# Patient Record
Sex: FEMALE | Race: WHITE | Employment: FULL TIME | URBAN - METROPOLITAN AREA
[De-identification: names, ages, dates, MRNs, and addresses within clinical notes are randomized per-mention and may not be internally consistent; named-entity substitution may affect disease eponyms.]

---

## 2017-01-05 ENCOUNTER — OFFICE VISIT (OUTPATIENT)
Dept: FAMILY MEDICINE | Facility: CLINIC | Age: 22
End: 2017-01-05
Payer: COMMERCIAL

## 2017-01-05 VITALS
SYSTOLIC BLOOD PRESSURE: 124 MMHG | DIASTOLIC BLOOD PRESSURE: 78 MMHG | WEIGHT: 221 LBS | BODY MASS INDEX: 34.61 KG/M2 | TEMPERATURE: 97.1 F | HEART RATE: 99 BPM | OXYGEN SATURATION: 97 %

## 2017-01-05 DIAGNOSIS — Z30.013 ENCOUNTER FOR INITIAL PRESCRIPTION OF INJECTABLE CONTRACEPTIVE: ICD-10-CM

## 2017-01-05 DIAGNOSIS — F41.8 DEPRESSION WITH ANXIETY: Primary | ICD-10-CM

## 2017-01-05 PROCEDURE — 99213 OFFICE O/P EST LOW 20 MIN: CPT | Mod: 25 | Performed by: PHYSICIAN ASSISTANT

## 2017-01-05 PROCEDURE — 96372 THER/PROPH/DIAG INJ SC/IM: CPT | Performed by: PHYSICIAN ASSISTANT

## 2017-01-05 ASSESSMENT — ANXIETY QUESTIONNAIRES
7. FEELING AFRAID AS IF SOMETHING AWFUL MIGHT HAPPEN: SEVERAL DAYS
IF YOU CHECKED OFF ANY PROBLEMS ON THIS QUESTIONNAIRE, HOW DIFFICULT HAVE THESE PROBLEMS MADE IT FOR YOU TO DO YOUR WORK, TAKE CARE OF THINGS AT HOME, OR GET ALONG WITH OTHER PEOPLE: SOMEWHAT DIFFICULT
3. WORRYING TOO MUCH ABOUT DIFFERENT THINGS: MORE THAN HALF THE DAYS
2. NOT BEING ABLE TO STOP OR CONTROL WORRYING: MORE THAN HALF THE DAYS
GAD7 TOTAL SCORE: 11
5. BEING SO RESTLESS THAT IT IS HARD TO SIT STILL: NOT AT ALL
1. FEELING NERVOUS, ANXIOUS, OR ON EDGE: MORE THAN HALF THE DAYS
6. BECOMING EASILY ANNOYED OR IRRITABLE: NEARLY EVERY DAY

## 2017-01-05 ASSESSMENT — PATIENT HEALTH QUESTIONNAIRE - PHQ9: 5. POOR APPETITE OR OVEREATING: SEVERAL DAYS

## 2017-01-05 NOTE — NURSING NOTE
===View-only below this line===  >> Ami Kong   1/5/2017  1:16 PM  BLOOD PRESSURE: 124/78    DATE OF LAST PAP or ANNUAL EXAM: No results found for this basename: pap  URINE HCG:not indicated    The following medication was given:     MEDICATION: Depo Provera 150mg  ROUTE: IM  SITE: LUQ - Gluteus  : [employer]  LOT #: [id number]  EXPIRATION:07/01/19  NEXT INJECTION DUE: March 23- April 6 2016   Provider: Kristen Kehr  Given by: Ami Kong MA

## 2017-01-05 NOTE — PROGRESS NOTES
SUBJECTIVE:                                                    Meme Mccurdy is a 21 year old female who presents to clinic today for the following health issues:    Meme continues to use the sertraline 200 mg daily. She thinks her anxiety has been difficult to manage over the college winter break.   Her sleeping patterns are off because she slept so much during the day after her recent surgery. She feels better now and trying to get into a pattern again.   She will be back at school in 1-2 weeks. She will be working with her counselor when she returns to school.     Today, she will also need her depo injection.     Depression and Anxiety Follow-Up    Status since last visit: Improved     Other associated symptoms:None    Complicating factors:     Significant life event: No     Current substance abuse: None    PHQ-9 SCORE 9/20/2016 10/24/2016 11/21/2016   Total Score - - -   Total Score 10 10 11     AVRIL-7 SCORE 9/20/2016 10/24/2016 11/21/2016   Total Score - - -   Total Score 15 16 12        PHQ-9  English      PHQ-9   Any Language     GAD7         Amount of exercise or physical activity: 2-3 days/week for an average of greater than 60 minutes    Problems taking medications regularly: No    Medication side effects: none    Diet: regular (no restrictions)      Problem list and histories reviewed & adjusted, as indicated.  Additional history: as documented    Patient Active Problem List   Diagnosis     Melanocytic nevus of face     Ovarian cyst     BMI (body mass index), pediatric, 85% to less than 95% for age     Depression with anxiety     Insomnia     Encounter for initial prescription of injectable contraceptive     Past Surgical History   Procedure Laterality Date     Hc tooth extraction w/forcep  9/08     Tonsillectomy Bilateral 12/19/2016     Procedure: TONSILLECTOMY;  Surgeon: Ilya Montilla MD;  Location:  OR       Social History   Substance Use Topics     Smoking status: Never Smoker       Smokeless tobacco: Never Used     Alcohol Use: No     Family History   Problem Relation Age of Onset     Hypertension Mother      Arthritis Mother      Lipids Father      High Cholesterol     Breast Cancer Maternal Grandmother      CANCER Maternal Grandmother      DIABETES Maternal Grandfather      Hypertension Maternal Grandfather      Thyroid Disease No family hx of      Glaucoma No family hx of      Macular Degeneration No family hx of      CEREBROVASCULAR DISEASE Other          Current Outpatient Prescriptions   Medication Sig Dispense Refill     sertraline (ZOLOFT) 100 MG tablet Take 2 tablets (200 mg) by mouth daily 180 tablet 1     famotidine (PEPCID) 40 MG tablet Take 1 tablet (40 mg) by mouth At Bedtime 30 tablet 3     medroxyPROGESTERone (DEPO-PROVERA) 150 MG/ML injection Inject 1 mL (150 mg) into the muscle every 3 months 3 mL 3     ibuprofen (ADVIL,MOTRIN) 800 MG tablet Take 1 tablet by mouth every 8 hours as needed for pain. 60 tablet 1     Allergies   Allergen Reactions     Nkda [No Known Drug Allergies]        ROS:  Constitutional, HEENT, cardiovascular, pulmonary, gi and gu systems are negative, except as otherwise noted.    OBJECTIVE:                                                    /78 mmHg  Pulse 99  Temp(Src) 97.1  F (36.2  C) (Oral)  Wt 221 lb (100.245 kg)  SpO2 97%  Body mass index is 34.61 kg/(m^2).  GENERAL: healthy, alert and no distress  SKIN: no suspicious lesions or rashes  NEURO: Normal strength and tone, mentation intact and speech normal  PSYCH: mentation appears normal, affect normal/bright, judgement and insight intact and appearance well groomed    Diagnostic Test Results:  none      ASSESSMENT/PLAN:                                                    1. Depression with anxiety  Most of the appointment time was taken today for counseling. Medication adjustment will not help with her sleep patterns.   She is going to work on avoiding naps during the day, exercise and  keeping busy when she is on break, instead of sleeping.   She will get into a routine schedule for when she gets back to college. Set a regular bedtime schedule this week, no electronics 1 hour prior to laying down for bed.   Avoid caffeine. Healthy habits will help. Plan to work with her counselor when she returns to school to learn better life skills and behavioral modification for feelings of anxiety.     15 minutes spent in face to face counseling today.     2. Encounter for initial prescription of injectable contraceptive  - Medroxyprogesterone inj/1mg (Depo Provera J-Code)      Kristen M. Kehr, PA-C  [facility]

## 2017-01-05 NOTE — NURSING NOTE
"Chief Complaint   Patient presents with     Depression     recheck       Initial /78 mmHg  Pulse 99  Temp(Src) 97.1  F (36.2  C) (Oral)  Wt 221 lb (100.245 kg)  SpO2 97% Estimated body mass index is 34.61 kg/(m^2) as calculated from the following:    Height as of 12/13/16: 5' 7\" (1.702 m).    Weight as of this encounter: 221 lb (100.245 kg).  BP completed using cuff size: BEULAH Jones    "

## 2017-01-06 ASSESSMENT — ANXIETY QUESTIONNAIRES: GAD7 TOTAL SCORE: 11

## 2017-01-06 ASSESSMENT — PATIENT HEALTH QUESTIONNAIRE - PHQ9: SUM OF ALL RESPONSES TO PHQ QUESTIONS 1-9: 13

## 2017-01-20 ENCOUNTER — OFFICE VISIT (OUTPATIENT)
Dept: OTOLARYNGOLOGY | Facility: CLINIC | Age: 22
End: 2017-01-20
Payer: COMMERCIAL

## 2017-01-20 VITALS — BODY MASS INDEX: 35.47 KG/M2 | WEIGHT: 226 LBS | RESPIRATION RATE: 16 BRPM | HEIGHT: 67 IN

## 2017-01-20 DIAGNOSIS — Z90.89 S/P TONSILLECTOMY: Primary | ICD-10-CM

## 2017-01-20 PROCEDURE — 99024 POSTOP FOLLOW-UP VISIT: CPT | Performed by: OTOLARYNGOLOGY

## 2017-01-20 ASSESSMENT — PAIN SCALES - GENERAL: PAINLEVEL: NO PAIN (0)

## 2017-01-20 NOTE — PROGRESS NOTES
"History of Present Illness - Meme Mccrudy is a 22 year old female who is status post tonsillectomy on 12/19/2016.  There was the expected amount of discomfort in the postoperative period, but at this point the patient is back to a regular diet, and not needing pain medication.  There was no bleeding.    Exam -   Resp 16  Ht 1.702 m (5' 7\")  Wt 102.513 kg (226 lb)  BMI 35.39 kg/m2  General - The patient is well nourished and well developed, and appears to have good nutritional status.  Alert and oriented to person and place, answers questions and cooperates with examination appropriately.   Head and Face - Normocephalic and atraumatic, with no gross asymmetry noted of the contour of the facial features.  The facial nerve is intact, with strong symmetric movements.  Mouth - Examination of the oral cavity shows pink, healthy, moist mucosa.  No lesions or ulceration noted.  The dentition are in good repair.  The tongue is mobile and midline.  Oropharynx - The tonsil beds are remucosalizing appropriately.  No signs of bleeding or clots.  The Uvula is midline and the soft palate is symmetric. Small amount of inferior tonsil/glossotonsillar sulcus tissue regrowth versus residual tissue    A/P - Meme Mccurdy has had an uncomplicated tonsillectomy. She feels much better. They have no restrictions at this point and can return on an as needed basis.    "

## 2017-01-20 NOTE — MR AVS SNAPSHOT
After Visit Summary   1/20/2017    Meme Mccurdy    MRN: [id number]           Patient Information     Date Of Birth          1995        Visit Information        Provider Department      1/20/2017 10:30 AM Ilya Montilla MD [facility]        Today's Diagnoses     S/P tonsillectomy    -  1       Care Instructions    General Scheduling Information  To schedule your CT/MRI scan, please contact [facility] at [phone] [address] [place] [address]    To schedule your Surgery, please contact our Specialty Schedulers at [phone]    ENT Clinic Locations Clinic Hours Telephone Number     [facility]  [address]  [place] [address]   Tuesday:       8:00am -- 4:00pm    Wednesday:  8:00am - 4:00pm   To schedule an appointment with   Dr. Montilla,   please contact our   Specialty Scheduling Department at:     [phone]       [facility]  [address] [place] [address]   Friday:          8:00am - 4:00pm         Urgent Care Locations Clinic Hours Telephone Numbers     [facility] [place]  [address] [place] [address]     Monday-Friday:     11:00pm - 9:00pm    Saturday-Sunday:  9:00am - 5:00pm   [phone]     [facility]  [address]   [place] [address]     Monday-Friday:      5:00pm - 9:00pm     Saturday-Sunday:  9:00am - 5:00pm   [phone]             Follow-ups after your visit        Who to contact     If you have questions or need follow up information about today's clinic visit or your schedule please contact [facility] directly at [phone].  Normal or non-critical lab and imaging results will be communicated to you by MyChart, letter or phone within 4 business days after the clinic has received the results. If you do not hear from us within 7 days, please contact the clinic through MyChart or phone. If you have a critical or abnormal lab result, we will notify you by phone as  "soon as possible.  Submit refill requests through [employer] or call your pharmacy and they will forward the refill request to us. Please allow 3 business days for your refill to be completed.          Additional Information About Your Visit        [employer] Information     [employer] lets you send messages to your doctor, view your test results, renew your prescriptions, schedule appointments and more. To sign up, go to [email or web address] . Click on \"Log in\" on the left side of the screen, which will take you to the Welcome page. Then click on \"Sign up Now\" on the right side of the page.     You will be asked to enter the access code listed below, as well as some personal information. Please follow the directions to create your username and password.     Your access code is: [email or web address]  Expires: 2017 10:11 AM     Your access code will  in 90 days. If you need help or a new code, please call your [facility] clinic or [phone].        Care EveryWhere ID     This is your Care EveryWhere ID. This could be used by other organizations to access your [facility] medical records  [id number]        Your Vitals Were     Respirations Height BMI (Body Mass Index)             16 1.702 m (5' 7\") 35.39 kg/m2          Blood Pressure from Last 3 Encounters:   17 124/78   16 112/72   16 122/82    Weight from Last 3 Encounters:   17 102.513 kg (226 lb)   17 100.245 kg (221 lb) (98.69 %*)   16 104.327 kg (230 lb) (98.99 %*)     * Growth percentiles are based on CDC 2-20 Years data.              Today, you had the following     No orders found for display       Primary Care Provider Office Phone # Fax #    Kristen M Kehr, PA-C [phone]       [facility] [address] [facility] [address]        Thank you!     Thank you for choosing [facility]  for your care. Our goal is always to provide you with excellent care. Hearing back from our patients " is one way we can continue to improve our services. Please take a few minutes to complete the written survey that you may receive in the mail after your visit with us. Thank you!             Your Updated Medication List - Protect others around you: Learn how to safely use, store and throw away your medicines at www.disposemymeds.org.          This list is accurate as of: 1/20/17 12:36 PM.  Always use your most recent med list.                   Brand Name Dispense Instructions for use    famotidine 40 MG tablet    PEPCID    30 tablet    Take 1 tablet (40 mg) by mouth At Bedtime       ibuprofen 800 MG tablet    ADVIL/MOTRIN    60 tablet    Take 1 tablet by mouth every 8 hours as needed for pain.       medroxyPROGESTERone 150 MG/ML injection    DEPO-PROVERA    3 mL    Inject 1 mL (150 mg) into the muscle every 3 months       sertraline 100 MG tablet    ZOLOFT    180 tablet    Take 2 tablets (200 mg) by mouth daily

## 2017-01-20 NOTE — NURSING NOTE
"Chief Complaint   Patient presents with     Surgical Followup     Post Op Tonsillectomy. DOS: 12-19-16       Initial Resp 16  Ht 1.702 m (5' 7\")  Wt 102.513 kg (226 lb)  BMI 35.39 kg/m2 Estimated body mass index is 35.39 kg/(m^2) as calculated from the following:    Height as of this encounter: 1.702 m (5' 7\").    Weight as of this encounter: 102.513 kg (226 lb).  BP completed using cuff size: NA (Not Taken)    Lisa Hernandez MA    "

## 2017-01-20 NOTE — PATIENT INSTRUCTIONS
General Scheduling Information  To schedule your CT/MRI scan, please contact [place] [facility] at [phone]   [address] [place] [address]    To schedule your Surgery, please contact our Specialty Schedulers at [phone]    ENT Clinic Locations Clinic Hours Telephone Number     [facility]  [address] [place] [address]   Tuesday:       8:00am -- 4:00pm    Wednesday:  8:00am - 4:00pm   To schedule an appointment with   Dr. Montilla,   please contact our   Specialty Scheduling Department at:     [phone]       [facility]  [address] [place] [address]   Friday:          8:00am - 4:00pm         Urgent Care Locations Clinic Hours Telephone Numbers     [facility]  [address] [place] [address]     Monday-Friday:     11:00pm - 9:00pm    Saturday-Sunday:  9:00am - 5:00pm   [phone]     [facility]  [address] [place] [address]     Monday-Friday:      5:00pm - 9:00pm     Saturday-Sunday:  9:00am - 5:00pm   [phone]

## 2017-02-03 ENCOUNTER — OFFICE VISIT (OUTPATIENT)
Dept: ORTHOPEDICS | Facility: CLINIC | Age: 22
End: 2017-02-03
Payer: COMMERCIAL

## 2017-02-03 VITALS
HEIGHT: 67 IN | WEIGHT: 226 LBS | SYSTOLIC BLOOD PRESSURE: 123 MMHG | DIASTOLIC BLOOD PRESSURE: 69 MMHG | BODY MASS INDEX: 35.47 KG/M2

## 2017-02-03 DIAGNOSIS — G89.29 CHRONIC RIGHT SHOULDER PAIN: Primary | ICD-10-CM

## 2017-02-03 DIAGNOSIS — S49.91XA INJURY OF GLENOID LABRUM, RIGHT, INITIAL ENCOUNTER: ICD-10-CM

## 2017-02-03 DIAGNOSIS — M75.101 ROTATOR CUFF SYNDROME, RIGHT: ICD-10-CM

## 2017-02-03 DIAGNOSIS — M25.511 CHRONIC RIGHT SHOULDER PAIN: Primary | ICD-10-CM

## 2017-02-03 PROCEDURE — 99203 OFFICE O/P NEW LOW 30 MIN: CPT | Performed by: FAMILY MEDICINE

## 2017-02-03 NOTE — NURSING NOTE
"Initial /69 mmHg  Ht 5' 7\" (1.702 m)  Wt 226 lb (102.513 kg)  BMI 35.39 kg/m2 Estimated body mass index is 35.39 kg/(m^2) as calculated from the following:    Height as of this encounter: 5' 7\" (1.702 m).    Weight as of this encounter: 226 lb (102.513 kg). .    Robin Arredondo ATC  "

## 2017-02-03 NOTE — PROGRESS NOTES
"Meme Mccurdy  :  1995  DOS: 2/3/2017  MRN: [id number]    Sports Medicine Clinic Visit    PCP: Kehr, Kristen M    Meme Mccurdy is a 22 year old Right hand dominant female who is seen as self referral presenting with right shoulder pain.    Injury: Gradual onset of right shoulder pain, weakness while playing rugby over the last 4 months.  Pain located over right deep anterior, posterior shoulder, nonradiating.  Reports intermittent radiating, weakness/fatigue to right upper arm.  Additional Features:  Positive: popping, catching, locking and weakness.  Symptoms are better with Ibuprofen and Rest.  Symptoms are worse with: shoulder flexion/abduction, reaching, lying on right side.  Other evaluation and/or treatments so far consists of: Ice, Ibuprofen and Rest.  Recent imaging completed: No recent imaging completed.  Prior History of related problems: Previous h/o right shoulder pain with pitching softball in HIGH SCHOOL, improved after stopping.  Pain with laying on right side, has intermittent locking sensation.  Pain with trying to lift teammates in rugby and also with increased passing.    Social History: college student, rugby player @ [employer]    Review of Systems  Musculoskeletal: as above  Remainder of review of systems is negative including constitutional, CV, pulmonary, GI, Skin and Neurologic except as noted in HPI or medical history.    Past Medical History   Diagnosis Date     Allergic rhinitis      Allergies      dog     Eczema      Melanocytic nevus of face 2009     BMI (body mass index), pediatric, 85% to less than 95% for age 1/3/2013     Chronic GERD      Past Surgical History   Procedure Laterality Date     Hc tooth extraction w/forcep       Tonsillectomy Bilateral 2016     Procedure: TONSILLECTOMY;  Surgeon: Ilya Montilla MD;  Location: [facility] OR       Objective  /69 mmHg  Ht 5' 7\" (1.702 m)  Wt 226 lb (102.513 kg)  BMI 35.39 kg/m2    General: healthy, alert and in no " distress    HEENT: no scleral icterus or conjunctival erythema   Skin: no suspicious lesions or rash. No jaundice.   CV: regular rhythm by palpation, 2+ distal pulses, no pedal edema    Resp: normal respiratory effort without conversational dyspnea   Psych: normal mood and affect    Gait: nonantalgic, appropriate coordination and balance   Neuro: normal light touch sensory exam of the extremities. Motor strength as noted below     Right Shoulder exam    ROM:        Full active and passive ROM with flexion, extension, abduction, internal and external rotation.       asymmetric scapular motion on R       Painful terminal flexion and abduction, mildly in ER    Tender:        subacromial space       Lateral deltoid    Non Tender:       remainder of shoulder       sternoclavicular joint       acromioclavicular joint       posterior shoulder       periscapular region    Strength:        abduction 4/5       internal rotation 4/5       external rotation 5/5       adduction 5/5    Impingement testing:        positive (+) Neer       neg Fox       positive (+) empty can       neg (-) crossover       Positive O'aislinn       positive crank      Mild + speeds    Stability testing:       neg (-) relocation       neg (-) anterior glide       neg (-) sulcus sign    Skin:       no visible deformities       well perfused       capillary refill brisk    Sensation:        normal sensation over shoulder and upper extremity     Radiology  None performed today    Assessment:  1. Chronic right shoulder pain    2. Injury of glenoid labrum, right, initial encounter    3. Rotator cuff syndrome, right        Plan:  Discussed the assessment with the patient.  Follow up: will contact with MR arthrogram results  Suspicious story and exam for labral injury, significantly impacting her ability to play rugby  eval with MR arthrogram  Discussed diagnostic possibilities, low suspicion of full thickness or significant RTC/damage  Discussed will  likely offer PT to start regardless of labral issue or not  COuld start that today without pursuing MR immediately  Given her sports schedule and chronic issue she would like better definitions of the issue, which is reasonable  May offer surgical referral as well based on results  Limit painful activity for now  Home handouts provided and supportive care reviewed  All questions were answered today  Contact us with additional questions or concerns  Signs and sx of concern reviewed      Robbie Murphy DO, CABRET  Primary Care Sports Medicine  [facility]           Disclaimer: This note consists of symbols derived from keyboarding, dictation and/or voice recognition software. As a result, there may be errors in the script that have gone undetected. Please consider this when interpreting information found in this chart.

## 2017-02-17 ENCOUNTER — RADIANT APPOINTMENT (OUTPATIENT)
Dept: GENERAL RADIOLOGY | Facility: CLINIC | Age: 22
End: 2017-02-17
Attending: FAMILY MEDICINE
Payer: COMMERCIAL

## 2017-02-17 ENCOUNTER — RADIANT APPOINTMENT (OUTPATIENT)
Dept: MRI IMAGING | Facility: CLINIC | Age: 22
End: 2017-02-17
Attending: FAMILY MEDICINE
Payer: COMMERCIAL

## 2017-02-17 VITALS — OXYGEN SATURATION: 97 % | HEART RATE: 87 BPM | SYSTOLIC BLOOD PRESSURE: 120 MMHG | DIASTOLIC BLOOD PRESSURE: 70 MMHG

## 2017-02-17 DIAGNOSIS — M25.511 CHRONIC RIGHT SHOULDER PAIN: ICD-10-CM

## 2017-02-17 DIAGNOSIS — M75.101 ROTATOR CUFF SYNDROME, RIGHT: ICD-10-CM

## 2017-02-17 DIAGNOSIS — G89.29 CHRONIC RIGHT SHOULDER PAIN: ICD-10-CM

## 2017-02-17 DIAGNOSIS — S49.91XA INJURY OF GLENOID LABRUM, RIGHT, INITIAL ENCOUNTER: ICD-10-CM

## 2017-02-17 PROCEDURE — 73222 MRI JOINT UPR EXTREM W/DYE: CPT | Mod: RT | Performed by: RADIOLOGY

## 2017-02-17 PROCEDURE — A9579 GAD-BASE MR CONTRAST NOS,1ML: HCPCS | Performed by: FAMILY MEDICINE

## 2017-02-17 PROCEDURE — 23350 INJECTION FOR SHOULDER X-RAY: CPT | Mod: RT | Performed by: RADIOLOGY

## 2017-02-17 PROCEDURE — 73040 CONTRAST X-RAY OF SHOULDER: CPT | Mod: RT | Performed by: RADIOLOGY

## 2017-02-17 RX ORDER — IOPAMIDOL 408 MG/ML
10 INJECTION, SOLUTION INTRATHECAL ONCE
Status: COMPLETED | OUTPATIENT
Start: 2017-02-17 | End: 2017-02-17

## 2017-02-17 RX ADMIN — IOPAMIDOL 2 ML: 408 INJECTION, SOLUTION INTRATHECAL at 10:33

## 2017-02-17 NOTE — PROGRESS NOTES
: Meme Mccurdy was seen in X-ray today for a right shoulder gadinjection. Patient rated pain before procedure 7/10. After procedure patient rated pain 5/10. This pain level is acceptable to patient.

## 2017-02-21 ENCOUNTER — TELEPHONE (OUTPATIENT)
Dept: PALLIATIVE MEDICINE | Facility: CLINIC | Age: 22
End: 2017-02-21

## 2017-02-21 DIAGNOSIS — M25.511 RIGHT SHOULDER PAIN: Primary | ICD-10-CM

## 2017-02-21 NOTE — TELEPHONE ENCOUNTER
Pt.'s mother Saskia Mccurdy called about pt's MRI results. Mother stated that pt had seen Dr. Murphy recently and he ordered an MRI. Mother further states when pt called about MRI results pt was told to schedule an appointment with Dr. Murphy in order to get results. Please call Saskia at  (work # until 3p today) or her cell #[phone] to discuss how to get MRI results.

## 2017-02-21 NOTE — TELEPHONE ENCOUNTER
Discussed results with Dr Murphy - recommendation for physical therapy at this time and rest from any painful activity.    Spoke to patient discussed results of MRI and recommendation.  She prefers to complete physical therapy in [place], even though she at school right now.  KATHERINE order placed - scheduling info provided.  May contact clinic with further questions or concerns.    Robin Arredondo ATC

## 2017-02-24 ENCOUNTER — THERAPY VISIT (OUTPATIENT)
Dept: PHYSICAL THERAPY | Facility: CLINIC | Age: 22
End: 2017-02-24
Payer: COMMERCIAL

## 2017-02-24 DIAGNOSIS — G89.29 CHRONIC RIGHT SHOULDER PAIN: Primary | ICD-10-CM

## 2017-02-24 DIAGNOSIS — M25.511 CHRONIC RIGHT SHOULDER PAIN: Primary | ICD-10-CM

## 2017-02-24 PROCEDURE — 97161 PT EVAL LOW COMPLEX 20 MIN: CPT | Mod: GP | Performed by: PHYSICAL THERAPIST

## 2017-02-24 PROCEDURE — 97112 NEUROMUSCULAR REEDUCATION: CPT | Mod: GP | Performed by: PHYSICAL THERAPIST

## 2017-02-24 NOTE — PROGRESS NOTES
[facility] Initial Evaluation    Subjective:    Meme Mccurdy is a 22 year old female with a right shoulder condition.  Condition occurred with:  Repetition/overuse.  Condition occurred: during recreation/sport.  This is a chronic condition  Worse since Sept 2016 playing rugby.  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  Locking sensation is new though.    Patient reports pain:  Anterior.    Quality: throbbing. and is intermittent and reported as 10/10.  Associated symptoms:  Locking. Pain is worse in the P.M..  Symptoms are exacerbated by using arm overhead, lifting, certain positions and lying on extremity and relieved by ice and heat (has to forcefully move it when it locks up).  Since onset symptoms are gradually worsening.  Special tests:  MRI (labral and supra/infra fraying).      General health as reported by patient is good.  Pertinent medical history includes:  Mental illness, depression and sleep disorder/apnea.  Medical allergies: no.  Other surgeries include:  Other (tonsils).    Current occupation is Student, rugby player.    Primary job tasks include:  Prolonged sitting.    Barriers include:  None as reported by the patient.    Red flags:  None as reported by the patient.                      Objective:    System                   Shoulder Evaluation:  ROM:  AROM:    Flexion:  Left:  157    Right:  156    Abduction:  Left: 161   Right:  150 (+)      External Rotation:  Left:  85    Right:  85            Extension/Internal Rotation:  Left:  T6    Right:  T6          Strength:    Flexion: Left:5/5    Pain: -    Right: 5/5      Pain:  -  Extension:  Left: 5/5     Pain:-    Right: 5/5     Pain:-  Abduction:  Left: 5/5   Pain:-    Right: 5-/5      Pain:+    Internal Rotation:  Left:5/5      Pain:-    Right: 5/5      Pain:-  External Rotation:   Left:5/5      Pain:-   Right:5/5      Pain:-    Horizontal Abduction:  Left:5-/5      Pain:-     Right:4-/5     Pain:-        Stability Testing:        Right shoulder stability negative testing:  Apprehension and Relocation  Special Tests:      Right shoulder positive for the following special tests:Labral (Mcbrides's)    Mobility Tests:  Mobility wnl shoulder: Hypomobile R rib 2.                                                 General     ROS    Assessment/Plan:      Patient is a 22 year old female with right shoulder complaints.    Patient has the following significant findings with corresponding treatment plan.                Diagnosis 1:  Shoulder pain likely due to labral fraying  Pain -  manual therapy, self management, education and home program  Decreased ROM/flexibility - manual therapy, therapeutic exercise and home program  Decreased strength - therapeutic exercise, therapeutic activities and home program  Impaired muscle performance - neuro re-education and home program    Therapy Evaluation Codes:   1) History comprised of:   Personal factors that impact the plan of care:      None.    Comorbidity factors that impact the plan of care are:      None.     Medications impacting care: None.  2) Examination of Body Systems comprised of:   Body structures and functions that impact the plan of care:      Shoulder.   Activity limitations that impact the plan of care are:      Lifting.  3) Clinical presentation characteristics are:   Stable/Uncomplicated.  4) Decision-Making    Low complexity using standardized patient assessment instrument and/or measureable assessment of functional outcome.  Cumulative Therapy Evaluation is: Low complexity.    Previous and current functional limitations:  (See Goal Flow Sheet for this information)    Short term and Long term goals: (See Goal Flow Sheet for this information)     Communication ability:  Patient appears to be able to clearly communicate and understand verbal and written communication and follow directions correctly.  Treatment Explanation - The following has  been discussed with the patient:   RX ordered/plan of care  Anticipated outcomes  Possible risks and side effects  This patient would benefit from PT intervention to resume normal activities.   Rehab potential is good.    Frequency:  1 X week, once daily  Duration:  for 6 weeks  Discharge Plan:  Achieve all LTG.  Independent in home treatment program.  Reach maximal therapeutic benefit.    Please refer to the daily flowsheet for treatment today, total treatment time and time spent performing 1:1 timed codes.

## 2017-02-24 NOTE — MR AVS SNAPSHOT
"              After Visit Summary   2/24/2017    Meme Mccurdy    MRN: [id number]           Patient Information     Date Of Birth          1995        Visit Information        Provider Department      2/24/2017 11:00 AM Damien Rivers, PT [facility] [place]        Today's Diagnoses     Chronic right shoulder pain    -  1       Follow-ups after your visit        Your next 10 appointments already scheduled     Mar 17, 2017 11:00 AM CDT   KATHERINE Extremity with Robert Steinberg PT   [facility] [place] PT ([facility] [place])    [address]  [place] [address]   [phone]            Mar 24, 2017 11:00 AM CDT   KATHERINE Extremity with Robert Steinberg PT   [facility] [place] PT ([facility] [place])    [address]  [place] [address]   [phone]              Who to contact     If you have questions or need follow up information about today's clinic visit or your schedule please contact [facility] [place] directly at [phone].  Normal or non-critical lab and imaging results will be communicated to you by [employer], letter or phone within 4 business days after the clinic has received the results. If you do not hear from us within 7 days, please contact the clinic through Foremostt or phone. If you have a critical or abnormal lab result, we will notify you by phone as soon as possible.  Submit refill requests through [email or web address] or call your pharmacy and they will forward the refill request to us. Please allow 3 business days for your refill to be completed.          Additional Information About Your Visit        [employer] Information     [email or web address] lets you send messages to your doctor, view your test results, renew your prescriptions, schedule appointments and more. To sign up, go to [email or web address] . Click on \"Log in\" on the left side of the screen, which will take you to the Welcome " "page. Then click on \"Sign up Now\" on the right side of the page.     You will be asked to enter the access code listed below, as well as some personal information. Please follow the directions to create your username and password.     Your access code is: [email or web address]  Expires: 2017 12:37 PM     Your access code will  in 90 days. If you need help or a new code, please call your [facility] or [phone].        Care EveryWhere ID     This is your Care EveryWhere ID. This could be used by other organizations to access your [place] medical records  [id number]         Blood Pressure from Last 3 Encounters:   17 120/70   17 123/69   17 124/78    Weight from Last 3 Encounters:   17 102.5 kg (226 lb)   17 102.5 kg (226 lb)   17 100.2 kg (221 lb) (99 %)*     * Growth percentiles are based on [facility] 2-20 Years data.              We Performed the Following     HC PT EVAL, LOW COMPLEXITY     NEUROMUSCULAR RE-EDUCATION        Primary Care Provider Office Phone # Fax #    Kristen M Kehr, PA-C [phone]       [facility] [address] [place] [address]        Thank you!     Thank you for choosing [facility]  for your care. Our goal is always to provide you with excellent care. Hearing back from our patients is one way we can continue to improve our services. Please take a few minutes to complete the written survey that you may receive in the mail after your visit with us. Thank you!             Your Updated Medication List - Protect others around you: Learn how to safely use, store and throw away your medicines at www.disposemymeds.org.          This list is accurate as of: 17 12:37 PM.  Always use your most recent med list.                   Brand Name Dispense Instructions for use    famotidine 40 MG tablet    PEPCID    30 tablet    Take 1 tablet (40 mg) by mouth At Bedtime       ibuprofen 800 MG tablet    " ADVIL/MOTRIN    60 tablet    Take 1 tablet by mouth every 8 hours as needed for pain.       medroxyPROGESTERone 150 MG/ML injection    DEPO-PROVERA    3 mL    Inject 1 mL (150 mg) into the muscle every 3 months       sertraline 100 MG tablet    ZOLOFT    180 tablet    Take 2 tablets (200 mg) by mouth daily

## 2017-03-17 ENCOUNTER — THERAPY VISIT (OUTPATIENT)
Dept: PHYSICAL THERAPY | Facility: CLINIC | Age: 22
End: 2017-03-17
Payer: COMMERCIAL

## 2017-03-17 DIAGNOSIS — M25.511 CHRONIC RIGHT SHOULDER PAIN: ICD-10-CM

## 2017-03-17 DIAGNOSIS — G89.29 CHRONIC RIGHT SHOULDER PAIN: ICD-10-CM

## 2017-03-17 PROCEDURE — 97110 THERAPEUTIC EXERCISES: CPT | Mod: GP | Performed by: PHYSICAL THERAPIST

## 2017-03-17 PROCEDURE — 97112 NEUROMUSCULAR REEDUCATION: CPT | Mod: GP | Performed by: PHYSICAL THERAPIST

## 2017-03-22 ENCOUNTER — TELEPHONE (OUTPATIENT)
Dept: FAMILY MEDICINE | Facility: CLINIC | Age: 22
End: 2017-03-22

## 2017-03-22 NOTE — TELEPHONE ENCOUNTER
She has a sore throat and a cough (that wakes her up in the middle of the night) She had her tonsils removed in December.  Should she be seen?

## 2017-03-22 NOTE — TELEPHONE ENCOUNTER
"Mom calling on behalf of patient, MIKAEL is identified.   Patient had tonsillectomy 12/2016.  Patient has had symptoms of runny nose and congestion for sometime, thought this was her allergies and was taking Claritin without effectiveness.  Per mom, patient is c/o sore throat.  Discussed risk of strep is lowered with tonsils having been removed.  \"throat is pretty bad\".  Advise can not say if this or is not strep, if pain is not relieved with home remedies would advise evaluation.  Mom will contact health services at patient's [place] [facility].   Verbalized good understanding.     Kristi Shell RN     "

## 2017-03-24 ENCOUNTER — THERAPY VISIT (OUTPATIENT)
Dept: PHYSICAL THERAPY | Facility: CLINIC | Age: 22
End: 2017-03-24
Payer: COMMERCIAL

## 2017-03-24 DIAGNOSIS — M25.511 CHRONIC RIGHT SHOULDER PAIN: ICD-10-CM

## 2017-03-24 DIAGNOSIS — G89.29 CHRONIC RIGHT SHOULDER PAIN: ICD-10-CM

## 2017-03-24 PROCEDURE — 97112 NEUROMUSCULAR REEDUCATION: CPT | Mod: GP | Performed by: PHYSICAL THERAPIST

## 2017-03-24 PROCEDURE — 97110 THERAPEUTIC EXERCISES: CPT | Mod: GP | Performed by: PHYSICAL THERAPIST

## 2017-03-24 NOTE — MR AVS SNAPSHOT
"              After Visit Summary   3/24/2017    Meme Mccurdy    MRN: [id number]           Patient Information     Date Of Birth          1995        Visit Information        Provider Department      3/24/2017 11:00 AM Robert Steinberg PT [facility] [place] [facility]        Today's Diagnoses     Chronic right shoulder pain           Follow-ups after your visit        Your next 10 appointments already scheduled     Apr 14, 2017 11:00 AM CDT   KATHERINE Extremity with Robert Steinberg PT   [facility] [place] [facility] ([facility] [place])    [address]  [address]  [place] [address] [phone]              Who to contact     If you have questions or need follow up information about today's clinic visit or your schedule please contact [facility] [place] directly at [phone].  Normal or non-critical lab and imaging results will be communicated to you by nprogresshart, letter or phone within 4 business days after the clinic has received the results. If you do not hear from us within 7 days, please contact the clinic through MyChart or phone. If you have a critical or abnormal lab result, we will notify you by phone as soon as possible.  Submit refill requests through Newsela or call your pharmacy and they will forward the refill request to us. Please allow 3 business days for your refill to be completed.          Additional Information About Your Visit        nprogresshart Information     Newsela lets you send messages to your doctor, view your test results, renew your prescriptions, schedule appointments and more. To sign up, go to [email or web address] . Click on \"Log in\" on the left side of the screen, which will take you to the Welcome page. Then click on \"Sign up Now\" on the right side of the page.     You will be asked to enter the access code listed below, as well as some personal information. Please follow the directions to create your username and " password.     Your access code is: [email or web address]  Expires: 2017  1:37 PM     Your access code will  in 90 days. If you need help or a new code, please call your [facility] or [phone].        Care EveryWhere ID     This is your Care EveryWhere ID. This could be used by other organizations to access your [place] medical records  [id number]         Blood Pressure from Last 3 Encounters:   17 120/70   17 123/69   17 124/78    Weight from Last 3 Encounters:   17 102.5 kg (226 lb)   17 102.5 kg (226 lb)   17 100.2 kg (221 lb) (99 %)*     * Growth percentiles are based on [facility] 2-20 Years data.              We Performed the Following     NEUROMUSCULAR RE-EDUCATION     THERAPEUTIC EXERCISES        Primary Care Provider Office Phone # Fax #    Kristen M Kehr, PA-C [phone]       [facility] [address] [address] [address]        Thank you!     Thank you for choosing [facility]  for your care. Our goal is always to provide you with excellent care. Hearing back from our patients is one way we can continue to improve our services. Please take a few minutes to complete the written survey that you may receive in the mail after your visit with us. Thank you!             Your Updated Medication List - Protect others around you: Learn how to safely use, store and throw away your medicines at www.disposemymeds.org.          This list is accurate as of: 3/24/17  3:09 PM.  Always use your most recent med list.                   Brand Name Dispense Instructions for use    famotidine 40 MG tablet    PEPCID    30 tablet    Take 1 tablet (40 mg) by mouth At Bedtime       ibuprofen 800 MG tablet    ADVIL/MOTRIN    60 tablet    Take 1 tablet by mouth every 8 hours as needed for pain.       medroxyPROGESTERone 150 MG/ML injection    DEPO-PROVERA    3 mL    Inject 1 mL (150 mg) into the muscle every 3 months        sertraline 100 MG tablet    ZOLOFT    180 tablet    Take 2 tablets (200 mg) by mouth daily

## 2017-03-31 ENCOUNTER — ALLIED HEALTH/NURSE VISIT (OUTPATIENT)
Dept: NURSING | Facility: CLINIC | Age: 22
End: 2017-03-31
Payer: COMMERCIAL

## 2017-03-31 VITALS
HEART RATE: 77 BPM | BODY MASS INDEX: 35.87 KG/M2 | DIASTOLIC BLOOD PRESSURE: 76 MMHG | WEIGHT: 229 LBS | SYSTOLIC BLOOD PRESSURE: 107 MMHG

## 2017-03-31 DIAGNOSIS — Z30.42 DEPO-PROVERA CONTRACEPTIVE STATUS: Primary | ICD-10-CM

## 2017-03-31 PROCEDURE — 99207 ZZC NO CHARGE NURSE ONLY: CPT

## 2017-03-31 PROCEDURE — 96372 THER/PROPH/DIAG INJ SC/IM: CPT

## 2017-03-31 NOTE — MR AVS SNAPSHOT
"              After Visit Summary   3/31/2017    Meme Mccurdy    MRN: [id number]           Patient Information     Date Of Birth          1995        Visit Information        Provider Department      3/31/2017 9:00 AM AN ANCILLARY [facility]        Today's Diagnoses     Depo-Provera contraceptive status    -  1       Follow-ups after your visit        Your next 10 appointments already scheduled     Apr 14, 2017 11:00 AM CDT   KATHERINE Extremity with Robert Steinberg PT   [facility] [place] [facility] ([facility] [place])    [address]  [address]  [place] [address] [phone]              Who to contact     If you have questions or need follow up information about today's clinic visit or your schedule please contact [facility] directly at [phone].  Normal or non-critical lab and imaging results will be communicated to you by MyChart, letter or phone within 4 business days after the clinic has received the results. If you do not hear from us within 7 days, please contact the clinic through MyChart or phone. If you have a critical or abnormal lab result, we will notify you by phone as soon as possible.  Submit refill requests through Wizzgo or call your pharmacy and they will forward the refill request to us. Please allow 3 business days for your refill to be completed.          Additional Information About Your Visit        MyChart Information     Wizzgo lets you send messages to your doctor, view your test results, renew your prescriptions, schedule appointments and more. To sign up, go to [email or web address] . Click on \"Log in\" on the left side of the screen, which will take you to the Welcome page. Then click on \"Sign up Now\" on the right side of the page.     You will be asked to enter the access code listed below, as well as some personal information. Please follow the directions to create your username and password.     Your access code is: " [email or web address]  Expires: 2017  1:37 PM     Your access code will  in 90 days. If you need help or a new code, please call your [place] clinic or [phone].        Care EveryWhere ID     This is your Care EveryWhere ID. This could be used by other organizations to access your [place] medical records  [id number]        Your Vitals Were     Pulse BMI (Body Mass Index)                77 35.87 kg/m2           Blood Pressure from Last 3 Encounters:   17 107/76   17 120/70   17 123/69    Weight from Last 3 Encounters:   17 229 lb (103.9 kg)   17 226 lb (102.5 kg)   17 226 lb (102.5 kg)              We Performed the Following     C Medroxyprogesterone inj/1mg        Primary Care Provider Office Phone # Fax #    Kristen M Kehr, PA-C [phone]       [facility] [address] [address] [address]        Thank you!     Thank you for choosing [facility]  for your care. Our goal is always to provide you with excellent care. Hearing back from our patients is one way we can continue to improve our services. Please take a few minutes to complete the written survey that you may receive in the mail after your visit with us. Thank you!             Your Updated Medication List - Protect others around you: Learn how to safely use, store and throw away your medicines at www.disposemymeds.org.          This list is accurate as of: 3/31/17 10:16 AM.  Always use your most recent med list.                   Brand Name Dispense Instructions for use    famotidine 40 MG tablet    PEPCID    30 tablet    Take 1 tablet (40 mg) by mouth At Bedtime       ibuprofen 800 MG tablet    ADVIL/MOTRIN    60 tablet    Take 1 tablet by mouth every 8 hours as needed for pain.       medroxyPROGESTERone 150 MG/ML injection    DEPO-PROVERA    3 mL    Inject 1 mL (150 mg) into the muscle every 3 months       sertraline 100 MG tablet    ZOLOFT    180 tablet    Take 2  tablets (200 mg) by mouth daily

## 2017-06-16 ENCOUNTER — ALLIED HEALTH/NURSE VISIT (OUTPATIENT)
Dept: NURSING | Facility: CLINIC | Age: 22
End: 2017-06-16
Payer: COMMERCIAL

## 2017-06-16 VITALS
BODY MASS INDEX: 36.49 KG/M2 | SYSTOLIC BLOOD PRESSURE: 108 MMHG | WEIGHT: 233 LBS | HEART RATE: 83 BPM | DIASTOLIC BLOOD PRESSURE: 72 MMHG

## 2017-06-16 DIAGNOSIS — Z30.42 DEPO-PROVERA CONTRACEPTIVE STATUS: Primary | ICD-10-CM

## 2017-06-16 PROCEDURE — 96372 THER/PROPH/DIAG INJ SC/IM: CPT

## 2017-06-16 PROCEDURE — 99207 ZZC NO CHARGE NURSE ONLY: CPT

## 2017-06-16 NOTE — NURSING NOTE
BLOOD PRESSURE: 108/72    The following medication was given:     MEDICATION: Depo Provera 150mg  ROUTE: IM  SITE: Ventrogluteal - Left  : [employer]  LOT #: [id number]  EXPIRATION:12/2019  NEXT INJECTION DUE: SEPT 1-15 2017  Provider: KRISTEN KEHR Elizabeth French, MA    Patient informed that she needs to be seen to update orders.

## 2017-06-16 NOTE — MR AVS SNAPSHOT
"              After Visit Summary   2017    Meme Mccurdy    MRN: [id number]           Patient Information     Date Of Birth          1995        Visit Information        Provider Department      2017 10:00 AM AN ANCILLARY [facility]        Today's Diagnoses     Depo-Provera contraceptive status    -  1       Follow-ups after your visit        Who to contact     If you have questions or need follow up information about today's clinic visit or your schedule please contact [facility] directly at [phone].  Normal or non-critical lab and imaging results will be communicated to you by MyChart, letter or phone within 4 business days after the clinic has received the results. If you do not hear from us within 7 days, please contact the clinic through HeyLetshart or phone. If you have a critical or abnormal lab result, we will notify you by phone as soon as possible.  Submit refill requests through Assistera or call your pharmacy and they will forward the refill request to us. Please allow 3 business days for your refill to be completed.          Additional Information About Your Visit        MyChart Information     Assistera lets you send messages to your doctor, view your test results, renew your prescriptions, schedule appointments and more. To sign up, go to [email or web address] . Click on \"Log in\" on the left side of the screen, which will take you to the Welcome page. Then click on \"Sign up Now\" on the right side of the page.     You will be asked to enter the access code listed below, as well as some personal information. Please follow the directions to create your username and password.     Your access code is: [email or web address]  Expires: 2017 10:47 AM     Your access code will  in 90 days. If you need help or a new code, please call your [facility] or [phone].        Care EveryWhere ID     This is your Care EveryWhere ID. This could be used by other " organizations to access your [place] medical records  [id number]        Your Vitals Were     Pulse BMI (Body Mass Index)                83 36.49 kg/m2           Blood Pressure from Last 3 Encounters:   06/16/17 108/72   03/31/17 107/76   02/17/17 120/70    Weight from Last 3 Encounters:   06/16/17 233 lb (105.7 kg)   03/31/17 229 lb (103.9 kg)   02/03/17 226 lb (102.5 kg)              We Performed the Following     C Medroxyprogesterone inj/1mg        Primary Care Provider Office Phone # Fax #    Kristen M Kehr, PA-C [phone]       [facility] [address] [address] [address]        Thank you!     Thank you for choosing [facility]  for your care. Our goal is always to provide you with excellent care. Hearing back from our patients is one way we can continue to improve our services. Please take a few minutes to complete the written survey that you may receive in the mail after your visit with us. Thank you!             Your Updated Medication List - Protect others around you: Learn how to safely use, store and throw away your medicines at www.disposemymeds.org.          This list is accurate as of: 6/16/17 10:47 AM.  Always use your most recent med list.                   Brand Name Dispense Instructions for use    famotidine 40 MG tablet    PEPCID    30 tablet    Take 1 tablet (40 mg) by mouth At Bedtime       ibuprofen 800 MG tablet    ADVIL/MOTRIN    60 tablet    Take 1 tablet by mouth every 8 hours as needed for pain.       medroxyPROGESTERone 150 MG/ML injection    DEPO-PROVERA    3 mL    Inject 1 mL (150 mg) into the muscle every 3 months       sertraline 100 MG tablet    ZOLOFT    180 tablet    Take 2 tablets (200 mg) by mouth daily

## 2017-08-14 PROBLEM — G89.29 CHRONIC RIGHT SHOULDER PAIN: Status: RESOLVED | Noted: 2017-02-24 | Resolved: 2017-08-14

## 2017-08-14 PROBLEM — M25.511 CHRONIC RIGHT SHOULDER PAIN: Status: RESOLVED | Noted: 2017-02-24 | Resolved: 2017-08-14

## 2017-08-14 NOTE — PROGRESS NOTES
DISCHARGE REPORT    Progress reporting period is from 2/24/17 to 3/24/17.       SUBJECTIVE  Subjective changes noted by patient:  Patient has not returned to PT as planned.  Current status is unknown.    Current pain level is unknown.     Previous pain level was  (see initial eval)   Changes in function:  Current status is unknown  Adverse reaction to treatment or activity: None    OBJECTIVE  Changes noted in objective findings:  Patient has failed to return to therapy so current objective findings are unknown.    ASSESSMENT/PLAN  Updated problem list and treatment plan: Diagnosis 1:  Shoulder pain    STG/LTGs have been met or progress has been made towards goals:  Current status is unknown  Assessment of Progress: The patient has not returned to therapy. Current status is unknown.  Self Management Plans:  Patient has been instructed in a home treatment program.  Silvia continues to require the following intervention to meet STG and LTG's:  PT intervention is no longer required to meet STG/LTG.    Recommendations:  Discharge from PT

## 2017-09-11 ENCOUNTER — OFFICE VISIT (OUTPATIENT)
Dept: FAMILY MEDICINE | Facility: CLINIC | Age: 22
End: 2017-09-11
Payer: COMMERCIAL

## 2017-09-11 VITALS
WEIGHT: 249 LBS | TEMPERATURE: 100.1 F | OXYGEN SATURATION: 97 % | BODY MASS INDEX: 39 KG/M2 | SYSTOLIC BLOOD PRESSURE: 126 MMHG | HEART RATE: 90 BPM | DIASTOLIC BLOOD PRESSURE: 88 MMHG

## 2017-09-11 DIAGNOSIS — M25.562 ACUTE PAIN OF LEFT KNEE: ICD-10-CM

## 2017-09-11 DIAGNOSIS — Z11.8 SPECIAL SCREENING EXAMINATION FOR CHLAMYDIAL DISEASE: ICD-10-CM

## 2017-09-11 DIAGNOSIS — Z30.013 ENCOUNTER FOR INITIAL PRESCRIPTION OF INJECTABLE CONTRACEPTIVE: ICD-10-CM

## 2017-09-11 DIAGNOSIS — F41.8 DEPRESSION WITH ANXIETY: Primary | ICD-10-CM

## 2017-09-11 DIAGNOSIS — Z23 NEED FOR PROPHYLACTIC VACCINATION AND INOCULATION AGAINST INFLUENZA: ICD-10-CM

## 2017-09-11 PROCEDURE — 87491 CHLMYD TRACH DNA AMP PROBE: CPT | Performed by: PHYSICIAN ASSISTANT

## 2017-09-11 PROCEDURE — 99213 OFFICE O/P EST LOW 20 MIN: CPT | Mod: 25 | Performed by: PHYSICIAN ASSISTANT

## 2017-09-11 PROCEDURE — 90471 IMMUNIZATION ADMIN: CPT | Performed by: PHYSICIAN ASSISTANT

## 2017-09-11 PROCEDURE — 96372 THER/PROPH/DIAG INJ SC/IM: CPT | Performed by: PHYSICIAN ASSISTANT

## 2017-09-11 PROCEDURE — 90686 IIV4 VACC NO PRSV 0.5 ML IM: CPT | Performed by: PHYSICIAN ASSISTANT

## 2017-09-11 RX ORDER — SERTRALINE HYDROCHLORIDE 100 MG/1
200 TABLET, FILM COATED ORAL DAILY
Qty: 180 TABLET | Refills: 1 | Status: SHIPPED | OUTPATIENT
Start: 2017-09-11 | End: 2018-03-12

## 2017-09-11 RX ORDER — MEDROXYPROGESTERONE ACETATE 150 MG/ML
150 INJECTION, SUSPENSION INTRAMUSCULAR
Qty: 1 ML | Refills: 3 | OUTPATIENT
Start: 2017-09-11 | End: 2018-11-08

## 2017-09-11 ASSESSMENT — PATIENT HEALTH QUESTIONNAIRE - PHQ9
5. POOR APPETITE OR OVEREATING: NOT AT ALL
SUM OF ALL RESPONSES TO PHQ QUESTIONS 1-9: 10

## 2017-09-11 ASSESSMENT — ANXIETY QUESTIONNAIRES
5. BEING SO RESTLESS THAT IT IS HARD TO SIT STILL: SEVERAL DAYS
1. FEELING NERVOUS, ANXIOUS, OR ON EDGE: MORE THAN HALF THE DAYS
3. WORRYING TOO MUCH ABOUT DIFFERENT THINGS: SEVERAL DAYS
2. NOT BEING ABLE TO STOP OR CONTROL WORRYING: MORE THAN HALF THE DAYS
IF YOU CHECKED OFF ANY PROBLEMS ON THIS QUESTIONNAIRE, HOW DIFFICULT HAVE THESE PROBLEMS MADE IT FOR YOU TO DO YOUR WORK, TAKE CARE OF THINGS AT HOME, OR GET ALONG WITH OTHER PEOPLE: SOMEWHAT DIFFICULT
7. FEELING AFRAID AS IF SOMETHING AWFUL MIGHT HAPPEN: NOT AT ALL
GAD7 TOTAL SCORE: 9
6. BECOMING EASILY ANNOYED OR IRRITABLE: NEARLY EVERY DAY

## 2017-09-11 NOTE — LETTER
[facility]  [address] [facility] [address] [phone]      September 12, 2017    Meme Mccurdy  [address] [address]            Dear Meme,    The results of your recent tests were normal.  Below is a copy of the results.  It was a pleasure to see you at your last appointment.    If you have any questions or concerns, please call myself or my nurse at [phone].    Sincerely,    Kristen Kehr, PA-C/imani    Results for orders placed or performed in visit on 09/11/17   Chlamydia trachomatis PCR   Result Value Ref Range    Specimen Description Urine     Chlamydia Trachomatis PCR Negative NEG^Negative

## 2017-09-11 NOTE — MR AVS SNAPSHOT
"              After Visit Summary   9/11/2017    Meme Mccurdy    MRN: [id number]           Patient Information     Date Of Birth          1995        Visit Information        Provider Department      9/11/2017 10:00 AM Kehr, Kristen M, PA-C [facility]        Today's Diagnoses     Need for prophylactic vaccination and inoculation against influenza    -  1    Special screening examination for chlamydial disease        Depression with anxiety        Encounter for initial prescription of injectable contraceptive          Care Instructions    Appointment in 6 months for depression follow up.           Follow-ups after your visit        Who to contact     If you have questions or need follow up information about today's clinic visit or your schedule please contact [facility] directly at [phone].  Normal or non-critical lab and imaging results will be communicated to you by MyChart, letter or phone within 4 business days after the clinic has received the results. If you do not hear from us within 7 days, please contact the clinic through [employer] or phone. If you have a critical or abnormal lab result, we will notify you by phone as soon as possible.  Submit refill requests through trivago or call your pharmacy and they will forward the refill request to us. Please allow 3 business days for your refill to be completed.          Additional Information About Your Visit        MyChart Information     trivago lets you send messages to your doctor, view your test results, renew your prescriptions, schedule appointments and more. To sign up, go to [email or web address] . Click on \"Log in\" on the left side of the screen, which will take you to the Welcome page. Then click on \"Sign up Now\" on the right side of the page.     You will be asked to enter the access code listed below, as well as some personal information. Please follow the directions to create your username and password.   "   Your access code is: [email or web address]  Expires: 2017 10:47 AM     Your access code will  in 90 days. If you need help or a new code, please call your [facility] or [phone].        Care EveryWhere ID     This is your Care EveryWhere ID. This could be used by other organizations to access your [place] medical records  [id number]        Your Vitals Were     Pulse Temperature Pulse Oximetry Breastfeeding? BMI (Body Mass Index)       90 100.1  F (37.8  C) (Oral) 97% No 39 kg/m2        Blood Pressure from Last 3 Encounters:   17 126/88   17 108/72   17 107/76    Weight from Last 3 Encounters:   17 249 lb (112.9 kg)   17 233 lb (105.7 kg)   17 229 lb (103.9 kg)              We Performed the Following     Chlamydia trachomatis PCR     FLU VAC, SPLIT VIRUS IM > 3 YO (QUADRIVALENT) [07839]     Vaccine Administration, Initial [33377]          Where to get your medicines      These medications were sent to [id number] - [place], [place] - [address]  [address], [place] [address]    Hours:  Same info as [facility] Rain Ayala Phone:  [phone]     sertraline 100 MG tablet         Some of these will need a paper prescription and others can be bought over the counter.  Ask your nurse if you have questions.     You don't need a prescription for these medications     medroxyPROGESTERone 150 MG/ML injection          Primary Care Provider Office Phone # Fax #    Kristen M Kehr, PA-C [phone] [address] [facility] [address]        Equal Access to Services     HUGO SMITH AH: Rui Michelle, wagretel reed, lida kaalmaalicia dunn, juli roberson. So [facility] [phone].    ATENCIÓN: Si habla español, tiene a prabhakar disposición servicios gratuitos de asistencia lingüística. Llame al [phone].    We comply with applicable federal civil rights laws and [place] laws. We do not discriminate on  the basis of race, color, national origin, age, disability sex, sexual orientation or gender identity.            Thank you!     [facility] you for choosing [facility]  [facility] your [facility]. Our goal is always to provide you with excellent care. Hearing back from our patients is one way we can continue to improve our services. Please take a few minutes to complete the written survey that you may receive in the mail after your visit with us. Thank you!             Your Updated Medication List - Protect others around you: Learn how to safely use, store and throw away your medicines at www.disposemymeds.org.          This list is accurate as of: 9/11/17 10:28 AM.  Always use your most recent med list.                   Brand Name Dispense Instructions for use Diagnosis    famotidine 40 MG tablet    PEPCID    30 tablet    Take 1 tablet (40 mg) by mouth At Bedtime    Gastroesophageal reflux disease without esophagitis       ibuprofen 800 MG tablet    ADVIL/MOTRIN    60 tablet    Take 1 tablet by mouth every 8 hours as needed for pain.    Follow-up exam after treatment       medroxyPROGESTERone 150 MG/ML injection    DEPO-PROVERA    1 mL    Inject 1 mL (150 mg) into the muscle every 3 months    Encounter for initial prescription of injectable contraceptive       sertraline 100 MG tablet    ZOLOFT    180 tablet    Take 2 tablets (200 mg) by mouth daily    Depression with anxiety

## 2017-09-11 NOTE — NURSING NOTE
"Chief Complaint   Patient presents with     Contraception     BC med refill     Depression     Sertraline med refill     Knee Pain     Recheck L knee pain per pt        Initial /88  Pulse 90  Temp 100.1  F (37.8  C) (Oral)  Wt 249 lb (112.9 kg)  SpO2 97%  Breastfeeding? No  BMI 39 kg/m2 Estimated body mass index is 39 kg/(m^2) as calculated from the following:    Height as of 2/3/17: 5' 7\" (1.702 m).    Weight as of this encounter: 249 lb (112.9 kg).  Medication Reconciliation: complete      Guillermo Li MA    "

## 2017-09-11 NOTE — PROGRESS NOTES
SUBJECTIVE:                                                    Meme Mccurdy is a 22 year old female who presents to clinic today for the following health issues:    Depression and Anxiety Follow-Up    Status since last visit: Improved     She continues to use the sertraline at 200 mg daily.     Other associated symptoms:None    Complicating factors:     Significant life event: No     Current substance abuse: None    PHQ-9 SCORE 10/24/2016 11/21/2016 1/5/2017   Total Score - - -   Total Score 10 11 13     AVRIL-7 SCORE 10/24/2016 11/21/2016 1/5/2017   Total Score - - -   Total Score 16 12 11       PHQ-9  English  PHQ-9   Any Language  GAD7    Refill BC med and also recheck L knee.    1. She will need a refill of the depo for the year. She has not missed a dose of the medication and will get her injection today.     2. She injured her knee yesterday playing rugby. She is wearing a brace. There is pain in the medial aspect with activity. She is not using any medication for pain.       Amount of exercise or physical activity: None    Problems taking medications regularly: No    Medication side effects: none  Diet: regular (no restrictions)        Problem list and histories reviewed & adjusted, as indicated.  Additional history: as documented    Patient Active Problem List   Diagnosis     Melanocytic nevus of face     Ovarian cyst     BMI (body mass index), pediatric, 85% to less than 95% for age     Depression with anxiety     Insomnia     Encounter for initial prescription of injectable contraceptive     Past Surgical History:   Procedure Laterality Date     HC TOOTH EXTRACTION W/FORCEP  9/08     TONSILLECTOMY Bilateral 12/19/2016    Procedure: TONSILLECTOMY;  Surgeon: Ilya Montilla MD;  Location:  OR       Social History   Substance Use Topics     Smoking status: Never Smoker     Smokeless tobacco: Never Used     Alcohol use No     Family History   Problem Relation Age of Onset     Hypertension Mother       Arthritis Mother      Lipids Father      High Cholesterol     Breast Cancer Maternal Grandmother      CANCER Maternal Grandmother      DIABETES Maternal Grandfather      Hypertension Maternal Grandfather      CEREBROVASCULAR DISEASE Other      Thyroid Disease No family hx of      Glaucoma No family hx of      Macular Degeneration No family hx of          Current Outpatient Prescriptions   Medication Sig Dispense Refill     sertraline (ZOLOFT) 100 MG tablet Take 2 tablets (200 mg) by mouth daily 180 tablet 1     medroxyPROGESTERone (DEPO-PROVERA) 150 MG/ML injection Inject 1 mL (150 mg) into the muscle every 3 months 1 mL 3     famotidine (PEPCID) 40 MG tablet Take 1 tablet (40 mg) by mouth At Bedtime 30 tablet 3     ibuprofen (ADVIL,MOTRIN) 800 MG tablet Take 1 tablet by mouth every 8 hours as needed for pain. 60 tablet 1     [DISCONTINUED] sertraline (ZOLOFT) 100 MG tablet Take 2 tablets (200 mg) by mouth daily 180 tablet 1     [DISCONTINUED] medroxyPROGESTERone (DEPO-PROVERA) 150 MG/ML injection Inject 1 mL (150 mg) into the muscle every 3 months 3 mL 3     Allergies   Allergen Reactions     Nkda [No Known Drug Allergies]          ROS:  Constitutional, HEENT, cardiovascular, pulmonary, gi and gu systems are negative, except as otherwise noted.      OBJECTIVE:   /88  Pulse 90  Temp 100.1  F (37.8  C) (Oral)  Wt 249 lb (112.9 kg)  SpO2 97%  Breastfeeding? No  BMI 39 kg/m2  Body mass index is 39 kg/(m^2).  GENERAL: healthy, alert and no distress  MS: Left knee: there is fluid palpable in the knee. Tenderness over the medial joint line. Normal ROM. Meniscal testing is normal. Ligament testing is also normal. She is bearing weight without difficulty  SKIN: no suspicious lesions or rashes  PSYCH: mentation appears normal, affect normal/bright, judgement and insight intact and appearance well groomed    Diagnostic Test Results:  none     ASSESSMENT/PLAN:         1. Depression with anxiety  Stable on her  current dose of medication. Refills given.   Follow up in 6 months.   - sertraline (ZOLOFT) 100 MG tablet; Take 2 tablets (200 mg) by mouth daily  Dispense: 180 tablet; Refill: 1    2. Encounter for initial prescription of injectable contraceptive  Injection given today and prescription renewal x 1 year  - medroxyPROGESTERone (DEPO-PROVERA) 150 MG/ML injection; Inject 1 mL (150 mg) into the muscle every 3 months  Dispense: 1 mL; Refill: 3  - C Medroxyprogesterone inj/1mg    3. Acute pain of left knee  She will continue to wear the brace for support.   Rest, ice, elevation and NSAIDS  Notify if symptoms persist    4. Need for prophylactic vaccination and inoculation against influenza  - FLU VAC, SPLIT VIRUS IM > 3 YO (QUADRIVALENT) [45451]  - Vaccine Administration, Initial [22193]    5. Special screening examination for chlamydial disease  - Chlamydia trachomatis PCR      Kristen M. Kehr, PA-C  [facility]    Injectable Influenza Immunization Documentation    1.  Are you sick today? (Fever of 100.5 or higher on the day of the clinic)   No    2.  Have you ever had Guillain-Alcova Syndrome within 6 weeks of an influenza vaccionation?  No    3. Do you have a life-threatening allergy to eggs?  No    4. Do you have a life-threatening allergy to a component of the vaccine? May include antibiotics, gelatin or latex.  No     5. Have you ever had a reaction to a dose of flu vaccine that needed immediate medical attention?  No     Form completed by Guillermo Li MA

## 2017-09-11 NOTE — NURSING NOTE
BP: 126/88    LAST PAP/EXAM: No results found for: PAP  URINE HCG:not indicated    The following medication was given:     MEDICATION: Depo Provera 150mg  ROUTE: IM  SITE: RUQ - [facility]  : [employer]  LOT #: [id number]  EXP:01/2020  NEXT INJECTION DUE: 11/27/17 - 12/11/17   NDC# [address]-0  Provider: Kehr, Kristen PA-C  Given by Mike MARTINEZ MA  Reminder card was given to patient.

## 2017-09-12 LAB
C TRACH DNA SPEC QL NAA+PROBE: NEGATIVE
SPECIMEN SOURCE: NORMAL

## 2017-09-12 ASSESSMENT — ANXIETY QUESTIONNAIRES: GAD7 TOTAL SCORE: 9

## 2017-12-08 ENCOUNTER — ALLIED HEALTH/NURSE VISIT (OUTPATIENT)
Dept: NURSING | Facility: CLINIC | Age: 22
End: 2017-12-08
Payer: COMMERCIAL

## 2017-12-08 VITALS
BODY MASS INDEX: 38.69 KG/M2 | SYSTOLIC BLOOD PRESSURE: 135 MMHG | WEIGHT: 247 LBS | HEART RATE: 80 BPM | DIASTOLIC BLOOD PRESSURE: 87 MMHG

## 2017-12-08 DIAGNOSIS — Z30.42 DEPO-PROVERA CONTRACEPTIVE STATUS: Primary | ICD-10-CM

## 2017-12-08 PROCEDURE — 99207 ZZC NO CHARGE NURSE ONLY: CPT

## 2017-12-08 PROCEDURE — 96372 THER/PROPH/DIAG INJ SC/IM: CPT

## 2017-12-08 NOTE — MR AVS SNAPSHOT
"              After Visit Summary   12/8/2017    Meme Mccurdy    MRN: [id number]           Patient Information     Date Of Birth          1995        Visit Information        Provider Department      12/8/2017 10:45 AM AN ANCILLARY [facility]        Today's Diagnoses     Depo-Provera contraceptive status    -  1       Follow-ups after your visit        Your next 10 appointments already scheduled     Mar 12, 2018 10:00 AM CDT   [facility]    [address] [facility] [address] [phone]           Bring a current list of meds and any records pertaining to this visit. For Physicals, please bring immunization records and any forms needing to be filled out. Please arrive 10 minutes early to complete paperwork.              Who to contact     If you have questions or need follow up information about today's clinic visit or your schedule please contact [facility] directly at [phone].  Normal or non-critical lab and imaging results will be communicated to you by [employer], letter or phone within 4 business days after the clinic has received the results. If you do not hear from us within 7 days, please contact the clinic through [employer] or phone. If you have a critical or abnormal lab result, we will notify you by phone as soon as possible.  Submit refill requests through [employer] or call your pharmacy and they will forward the refill request to us. Please allow 3 business days for your refill to be completed.          Additional Information About Your Visit        [employer] Information     [employer] lets you send messages to your doctor, view your test results, renew your prescriptions, schedule appointments and more. To sign up, go to [email or web address] . Click on \"Log in\" on the left side of the screen, which will take you to the Welcome page. Then click on \"Sign up Now\" on the right side of " the page.     You will be asked to enter the access code listed below, as well as some personal information. Please follow the directions to create your username and password.     Your access code is: [email or web address]  Expires: 3/8/2018 11:56 AM     Your access code will  in 90 days. If you need help or a new code, please call your [place] clinic or [phone].        Care EveryWhere ID     This is your Care EveryWhere ID. This could be used by other organizations to access your [place] medical records  [id number]        Your Vitals Were     Pulse BMI (Body Mass Index)                80 38.69 kg/m2           Blood Pressure from Last 3 Encounters:   17 135/87   17 126/88   17 108/72    Weight from Last 3 Encounters:   17 247 lb (112 kg)   17 249 lb (112.9 kg)   17 233 lb (105.7 kg)              We Performed the Following     C Medroxyprogesterone inj/1mg     INJECTION INTRAMUSCULAR OR SUB-Q        Primary Care Provider Office Phone # Fax #    Kristen M Kehr, PA-C [phone] [address] [facility] [address]        Equal Access to Services     ALEKSANDRA SMITH : Hadii aad ku hadasho Soomaali, waaxda luqadaha, qaybta kaalmada adeegyada, waxay idiin haychristina vega . So [facility] [phone].    ATENCIÓN: Si habla español, tiene a prabhakar disposición servicios gratuitos de asistencia lingüística. Llame al [phone].    We comply with applicable federal civil rights laws and [place] laws. We do not discriminate on the basis of race, color, national origin, age, disability, sex, sexual orientation, or gender identity.            Thank you!     Thank you for choosing [facility]  for your care. Our goal is always to provide you with excellent care. Hearing back from our patients is one way we can continue to improve our services. Please take a few minutes to complete the written survey that you may receive in the mail after your visit  with us. Thank you!             Your Updated Medication List - Protect others around you: Learn how to safely use, store and throw away your medicines at www.disposemymeds.org.          This list is accurate as of: 12/8/17 11:56 AM.  Always use your most recent med list.                   Brand Name Dispense Instructions for use Diagnosis    famotidine 40 MG tablet    PEPCID    30 tablet    Take 1 tablet (40 mg) by mouth At Bedtime    Gastroesophageal reflux disease without esophagitis       ibuprofen 800 MG tablet    ADVIL/MOTRIN    60 tablet    Take 1 tablet by mouth every 8 hours as needed for pain.    Follow-up exam after treatment       medroxyPROGESTERone 150 MG/ML injection    DEPO-PROVERA    1 mL    Inject 1 mL (150 mg) into the muscle every 3 months    Encounter for initial prescription of injectable contraceptive       sertraline 100 MG tablet    ZOLOFT    180 tablet    Take 2 tablets (200 mg) by mouth daily    Depression with anxiety

## 2017-12-08 NOTE — PROGRESS NOTES
BP: 135/87    LAST PAP/EXAM: No results found for: PAP  URINE HCG:not indicated    The following medication was given:     MEDICATION: Depo Provera 150mg  ROUTE: IM  SITE: Ventrogluteal - Right  : [employer]  LOT #: [id number]  EXP:3/2020  NEXT INJECTION DUE: 2/23/18 - 3/9/18   Provider: KRISTEN KEHR Elizabeth French, MA

## 2018-02-28 ENCOUNTER — ALLIED HEALTH/NURSE VISIT (OUTPATIENT)
Dept: NURSING | Facility: CLINIC | Age: 23
End: 2018-02-28
Payer: COMMERCIAL

## 2018-02-28 VITALS
HEART RATE: 82 BPM | DIASTOLIC BLOOD PRESSURE: 80 MMHG | SYSTOLIC BLOOD PRESSURE: 128 MMHG | BODY MASS INDEX: 40.1 KG/M2 | WEIGHT: 256 LBS

## 2018-02-28 DIAGNOSIS — Z30.42 DEPO-PROVERA CONTRACEPTIVE STATUS: Primary | ICD-10-CM

## 2018-02-28 PROCEDURE — 99207 ZZC NO CHARGE NURSE ONLY: CPT

## 2018-02-28 PROCEDURE — 96372 THER/PROPH/DIAG INJ SC/IM: CPT

## 2018-02-28 NOTE — PROGRESS NOTES
BP: 128/80    LAST PAP/EXAM: No results found for: PAP  URINE HCG:not indicated    The following medication was given:     MEDICATION: Depo Provera 150mg  ROUTE: IM  SITE: Ventrogluteal - Left  : [employer]  LOT #: [id number]  EXP:2/2020  NEXT INJECTION DUE: 5/16/18 - 5/30/18   Provider: KRISTEN KEHR Elizabeth French, MA

## 2018-02-28 NOTE — MR AVS SNAPSHOT
"              After Visit Summary   2/28/2018    Meme Mccurdy    MRN: [id number]           Patient Information     Date Of Birth          1995        Visit Information        Provider Department      2/28/2018 10:15 AM AN ANCILLARY [facility]        Today's Diagnoses     Depo-Provera contraceptive status    -  1       Follow-ups after your visit        Your next 10 appointments already scheduled     Mar 12, 2018 10:00 AM CDT   Office Visit with Kristen M Kehr, PA-C   [facility]    [address] [facility] [address] [phone]           Bring a current list of meds and any records pertaining to this visit. For Physicals, please bring immunization records and any forms needing to be filled out. Please arrive 10 minutes early to complete paperwork.              Who to contact     If you have questions or need follow up information about today's clinic visit or your schedule please contact [facility] directly at [phone].  Normal or non-critical lab and imaging results will be communicated to you by [employer]akeshart, letter or phone within 4 business days after the clinic has received the results. If you do not hear from us within 7 days, please contact the clinic through [employer] or phone. If you have a critical or abnormal lab result, we will notify you by phone as soon as possible.  Submit refill requests through [employer] or call your pharmacy and they will forward the refill request to us. Please allow 3 business days for your refill to be completed.          Additional Information About Your Visit        Webflakeshart Information     [employer] lets you send messages to your doctor, view your test results, renew your prescriptions, schedule appointments and more. To sign up, go to [email or web address] . Click on \"Log in\" on the left side of the screen, which will take you to the Welcome page. Then click on \"Sign up Now\" on the right side of " the page.     You will be asked to enter the access code listed below, as well as some personal information. Please follow the directions to create your username and password.     Your access code is: [email or web address]  Expires: 3/8/2018 11:56 AM     Your access code will  in 90 days. If you need help or a new code, please call your [place] clinic or [phone].        Care EveryWhere ID     This is your Care EveryWhere ID. This could be used by other organizations to access your [place] medical records  [id number]        Your Vitals Were     Pulse BMI (Body Mass Index)                82 40.1 kg/m2           Blood Pressure from Last 3 Encounters:   18 128/80   17 135/87   17 126/88    Weight from Last 3 Encounters:   18 256 lb (116.1 kg)   17 247 lb (112 kg)   17 249 lb (112.9 kg)              We Performed the Following     C Medroxyprogesterone inj/1mg     INJECTION INTRAMUSCULAR OR SUB-Q        Primary Care Provider Office Phone # Fax #    Kristen M Kehr, PA-C [phone] [address] [facility] [address]        Equal Access to Services     ALEKSANDRA SMITH : Hadii aad ku hadasho Soomaali, waaxda luqadaha, qaybta kaalmada adeegyada, waxay idiin haychristina vega . So [facility] [phone].    ATENCIÓN: Si habla español, tiene a prabhakar disposición servicios gratuitos de asistencia lingüística. Llame al [phone].    We comply with applicable federal civil rights laws and [place] laws. We do not discriminate on the basis of race, color, national origin, age, disability, sex, sexual orientation, or gender identity.            Thank you!     Thank you for choosing [facility]  for your care. Our goal is always to provide you with excellent care. Hearing back from our patients is one way we can continue to improve our services. Please take a few minutes to complete the written survey that you may receive in the mail after your visit  with us. Thank you!             Your Updated Medication List - Protect others around you: Learn how to safely use, store and throw away your medicines at www.disposemymeds.org.          This list is accurate as of 2/28/18 10:26 AM.  Always use your most recent med list.                   Brand Name Dispense Instructions for use Diagnosis    famotidine 40 MG tablet    PEPCID    30 tablet    Take 1 tablet (40 mg) by mouth At Bedtime    Gastroesophageal reflux disease without esophagitis       ibuprofen 800 MG tablet    ADVIL/MOTRIN    60 tablet    Take 1 tablet by mouth every 8 hours as needed for pain.    Follow-up exam after treatment       medroxyPROGESTERone 150 MG/ML injection    DEPO-PROVERA    1 mL    Inject 1 mL (150 mg) into the muscle every 3 months    Encounter for initial prescription of injectable contraceptive       sertraline 100 MG tablet    ZOLOFT    180 tablet    Take 2 tablets (200 mg) by mouth daily    Depression with anxiety

## 2018-03-11 ENCOUNTER — HEALTH MAINTENANCE LETTER (OUTPATIENT)
Age: 23
End: 2018-03-11

## 2018-03-12 ENCOUNTER — OFFICE VISIT (OUTPATIENT)
Dept: FAMILY MEDICINE | Facility: CLINIC | Age: 23
End: 2018-03-12
Payer: COMMERCIAL

## 2018-03-12 VITALS
TEMPERATURE: 97.9 F | OXYGEN SATURATION: 96 % | HEART RATE: 98 BPM | DIASTOLIC BLOOD PRESSURE: 80 MMHG | RESPIRATION RATE: 18 BRPM | BODY MASS INDEX: 39.1 KG/M2 | SYSTOLIC BLOOD PRESSURE: 124 MMHG | WEIGHT: 258 LBS | HEIGHT: 68 IN

## 2018-03-12 DIAGNOSIS — F41.8 DEPRESSION WITH ANXIETY: Primary | ICD-10-CM

## 2018-03-12 DIAGNOSIS — Z11.8 SPECIAL SCREENING EXAMINATION FOR CHLAMYDIAL DISEASE: ICD-10-CM

## 2018-03-12 PROCEDURE — 99213 OFFICE O/P EST LOW 20 MIN: CPT | Performed by: PHYSICIAN ASSISTANT

## 2018-03-12 PROCEDURE — 87491 CHLMYD TRACH DNA AMP PROBE: CPT | Performed by: PHYSICIAN ASSISTANT

## 2018-03-12 RX ORDER — SERTRALINE HYDROCHLORIDE 100 MG/1
200 TABLET, FILM COATED ORAL DAILY
Qty: 180 TABLET | Refills: 1 | Status: SHIPPED | OUTPATIENT
Start: 2018-03-12 | End: 2019-01-25

## 2018-03-12 ASSESSMENT — ANXIETY QUESTIONNAIRES
6. BECOMING EASILY ANNOYED OR IRRITABLE: NEARLY EVERY DAY
7. FEELING AFRAID AS IF SOMETHING AWFUL MIGHT HAPPEN: SEVERAL DAYS
5. BEING SO RESTLESS THAT IT IS HARD TO SIT STILL: SEVERAL DAYS
1. FEELING NERVOUS, ANXIOUS, OR ON EDGE: MORE THAN HALF THE DAYS
3. WORRYING TOO MUCH ABOUT DIFFERENT THINGS: NEARLY EVERY DAY
GAD7 TOTAL SCORE: 14
IF YOU CHECKED OFF ANY PROBLEMS ON THIS QUESTIONNAIRE, HOW DIFFICULT HAVE THESE PROBLEMS MADE IT FOR YOU TO DO YOUR WORK, TAKE CARE OF THINGS AT HOME, OR GET ALONG WITH OTHER PEOPLE: VERY DIFFICULT
2. NOT BEING ABLE TO STOP OR CONTROL WORRYING: MORE THAN HALF THE DAYS

## 2018-03-12 ASSESSMENT — PATIENT HEALTH QUESTIONNAIRE - PHQ9: 5. POOR APPETITE OR OVEREATING: MORE THAN HALF THE DAYS

## 2018-03-12 ASSESSMENT — PAIN SCALES - GENERAL: PAINLEVEL: NO PAIN (0)

## 2018-03-12 NOTE — MR AVS SNAPSHOT
"              After Visit Summary   3/12/2018    Meme Mccurdy    MRN: [id number]           Patient Information     Date Of Birth          1995        Visit Information        Provider Department      3/12/2018 10:00 AM Kehr, Kristen M, PA-C [facility]        Today's Diagnoses     Depression with anxiety    -  1    Special screening examination for chlamydial disease          Care Instructions    Appointment for routine exam within the next few weeks.     Follow up for depression in 6 months          Follow-ups after your visit        Your next 10 appointments already scheduled     Mar 19, 2018 12:00 PM CDT   PHYSICAL with Kristen M Kehr, PA-C   [facility] ([facility])    [address] [facility] [address] [phone]              Who to contact     If you have questions or need follow up information about today's clinic visit or your schedule please contact [facility] directly at [phone].  Normal or non-critical lab and imaging results will be communicated to you by MyChart, letter or phone within 4 business days after the clinic has received the results. If you do not hear from us within 7 days, please contact the clinic through Social Shophart or phone. If you have a critical or abnormal lab result, we will notify you by phone as soon as possible.  Submit refill requests through Vindicia or call your pharmacy and they will forward the refill request to us. Please allow 3 business days for your refill to be completed.          Additional Information About Your Visit        Social ShopharWaveseer Information     Vindicia lets you send messages to your doctor, view your test results, renew your prescriptions, schedule appointments and more. To sign up, go to [email or web address] . Click on \"Log in\" on the left side of the screen, which will take you to the Welcome page. Then click on \"Sign up Now\" on the right side of the page.     You will be asked to enter " "the access code listed below, as well as some personal information. Please follow the directions to create your username and password.     Your access code is: [email or web address]  Expires: 6/10/2018 10:06 AM     Your access code will  in 90 days. If you need help or a new code, please call your [place] clinic or [phone].        Care EveryWhere ID     This is your Care EveryWhere ID. This could be used by other organizations to access your [place] medical records  [id number]        Your Vitals Were     Pulse Temperature Respirations Height Pulse Oximetry BMI (Body Mass Index)    98 97.9  F (36.6  C) (Oral) 18 5' 8\" (1.727 m) 96% 39.23 kg/m2       Blood Pressure from Last 3 Encounters:   18 124/80   18 128/80   17 135/87    Weight from Last 3 Encounters:   18 258 lb (117 kg)   18 256 lb (116.1 kg)   17 247 lb (112 kg)              We Performed the Following     Chlamydia trachomatis PCR          Where to get your medicines      These medications were sent to [id number] - [place], [place] - [address]  [address], [place] [address]    Hours:  Same info as [facility] [place] Phone:  [phone]     sertraline 100 MG tablet          Primary Care Provider Office Phone # Fax #    Kristen M Kehr, PA-C [phone] [address] [facility] [address]        Equal Access to Services     [facility] : Hadii aad ku hadasho Soomaali, waaxda luqadaha, qaybta kaalmada mona, waxperla kemi vega . So [facility] [phone].    ATENCIÓN: Si habla español, tiene a prabhakar disposición servicios gratuitos de asistencia lingüística. Llame al [phone].    We comply with applicable federal civil rights laws and [place] laws. We do not discriminate on the basis of race, color, national origin, age, disability, sex, sexual orientation, or gender identity.            Thank you!     Thank you for choosing [facility]  for " your care. Our goal is always to provide you with excellent care. Hearing back from our patients is one way we can continue to improve our services. Please take a few minutes to complete the written survey that you may receive in the mail after your visit with us. Thank you!             Your Updated Medication List - Protect others around you: Learn how to safely use, store and throw away your medicines at www.disposemymeds.org.          This list is accurate as of 3/12/18 10:06 AM.  Always use your most recent med list.                   Brand Name Dispense Instructions for use Diagnosis    famotidine 40 MG tablet    PEPCID    30 tablet    Take 1 tablet (40 mg) by mouth At Bedtime    Gastroesophageal reflux disease without esophagitis       ibuprofen 800 MG tablet    ADVIL/MOTRIN    60 tablet    Take 1 tablet by mouth every 8 hours as needed for pain.    Follow-up exam after treatment       medroxyPROGESTERone 150 MG/ML injection    DEPO-PROVERA    1 mL    Inject 1 mL (150 mg) into the muscle every 3 months    Encounter for initial prescription of injectable contraceptive       sertraline 100 MG tablet    ZOLOFT    180 tablet    Take 2 tablets (200 mg) by mouth daily    Depression with anxiety

## 2018-03-12 NOTE — LETTER
My Depression Action Plan  Name: Meme Mccurdy   Date of Birth 1995  Date: 3/12/2018    My doctor: Kehr, Kristen M   My clinic: [facility]  [id number] [place] [address] [phone]          GREEN    ZONE   Good Control    What it looks like:     Things are going generally well. You have normal up s and down s. You may even feel depressed from time to time, but bad moods usually last less than a day.   What you need to do:  1. Continue to care for yourself (see self care plan)  2. Check your depression survival kit and update it as needed  3. Follow your physician s recommendations including any medication.  4. Do not stop taking medication unless you consult with your physician first.           YELLOW         ZONE Getting Worse    What it looks like:     Depression is starting to interfere with your life.     It may be hard to get out of bed; you may be starting to isolate yourself from others.    Symptoms of depression are starting to last most all day and this has happened for several days.     You may have suicidal thoughts but they are not constant.   What you need to do:     1. Call your care team, your response to treatment will improve if you keep your care team informed of your progress. Yellow periods are signs an adjustment may need to be made.     2. Continue your self-care, even if you have to fake it!    3. Talk to someone in your support network    4. Open up your depression survival kit           RED    ZONE Medical Alert - Get Help    What it looks like:     Depression is seriously interfering with your life.     You may experience these or other symptoms: You can t get out of bed most days, can t work or engage in other necessary activities, you have trouble taking care of basic hygiene, or basic responsibilities, thoughts of suicide or death that will not go away, self-injurious behavior.     What you need to do:  1. Call your care team and request a  same-day appointment. If they are not available (weekends or after hours) call your local crisis line, emergency room or 911.          Depression Self Care Plan / Survival Kit    Self-Care for Depression  Here s the deal. Your body and mind are really not as separate as most people think.  What you do and think affects how you feel and how you feel influences what you do and think. This means if you do things that people who feel good do, it will help you feel better.  Sometimes this is all it takes.  There is also a place for medication and therapy depending on how severe your depression is, so be sure to consult with your medical provider and/ or Behavioral Health Consultant if your symptoms are worsening or not improving.     In order to better manage my stress, I will:    Exercise  Get some form of exercise, every day. This will help reduce pain and release endorphins, the  feel good  chemicals in your brain. This is almost as good as taking antidepressants!  This is not the same as joining a gym and then never going! (they count on that by the way ) It can be as simple as just going for a walk or doing some gardening, anything that will get you moving.      Hygiene   Maintain good hygiene (Get out of bed in the morning, Make your bed, Brush your teeth, Take a shower, and Get dressed like you were going to work, even if you are unemployed).  If your clothes don't fit try to get ones that do.    Diet  I will strive to eat foods that are good for me, drink plenty of water, and avoid excessive sugar, caffeine, alcohol, and other mood-altering substances.  Some foods that are helpful in depression are: complex carbohydrates, B vitamins, flaxseed, fish or fish oil, fresh fruits and vegetables.    Psychotherapy  I agree to participate in Individual Therapy (if recommended).    Medication  If prescribed medications, I agree to take them.  Missing doses can result in serious side effects.  I understand that drinking  alcohol, or other illicit drug use, may cause potential side effects.  I will not stop my medication abruptly without first discussing it with my provider.    Staying Connected With Others  I will stay in touch with my friends, family members, and my primary care provider/team.    Use your imagination  Be creative.  We all have a creative side; it doesn t matter if it s oil painting, sand castles, or mud pies! This will also kick up the endorphins.    Witness Beauty  (AKA stop and smell the roses) Take a look outside, even in mid-winter. Notice colors, textures. Watch the squirrels and birds.     Service to others  Be of service to others.  There is always someone else in need.  By helping others we can  get out of ourselves  and remember the really important things.  This also provides opportunities for practicing all the other parts of the program.    Humor  Laugh and be silly!  Adjust your TV habits for less news and crime-drama and more comedy.    Control your stress  Try breathing deep, massage therapy, biofeedback, and meditation. Find time to relax each day.     My support system    Clinic Contact:  Phone number:    Contact 1:  Phone number:    Contact 2:  Phone number:    Taoist/:  Phone number:    Therapist:  Phone number:    Local crisis center:    Phone number:    Other community support:  Phone number:

## 2018-03-12 NOTE — NURSING NOTE
"Chief Complaint   Patient presents with     Depression     Anxiety     STD     check       Initial /90  Pulse 98  Temp 97.9  F (36.6  C) (Oral)  Resp 18  Ht 5' 8\" (1.727 m)  Wt 258 lb (117 kg)  SpO2 96%  BMI 39.23 kg/m2 Estimated body mass index is 39.23 kg/(m^2) as calculated from the following:    Height as of this encounter: 5' 8\" (1.727 m).    Weight as of this encounter: 258 lb (117 kg).  Medication Reconciliation: complete    JYOTI Marquez MA    "

## 2018-03-13 ENCOUNTER — TELEPHONE (OUTPATIENT)
Dept: FAMILY MEDICINE | Facility: CLINIC | Age: 23
End: 2018-03-13

## 2018-03-13 DIAGNOSIS — Z11.8 SPECIAL SCREENING EXAMINATION FOR CHLAMYDIAL DISEASE: Primary | ICD-10-CM

## 2018-03-13 LAB
C TRACH DNA SPEC QL NAA+PROBE: POSITIVE
SPECIMEN SOURCE: ABNORMAL

## 2018-03-13 RX ORDER — AZITHROMYCIN 500 MG/1
1000 TABLET, FILM COATED ORAL ONCE
Qty: 2 TABLET | Refills: 0 | Status: SHIPPED | OUTPATIENT
Start: 2018-03-13 | End: 2018-03-13

## 2018-03-13 ASSESSMENT — PATIENT HEALTH QUESTIONNAIRE - PHQ9: SUM OF ALL RESPONSES TO PHQ QUESTIONS 1-9: 11

## 2018-03-13 ASSESSMENT — ANXIETY QUESTIONNAIRES: GAD7 TOTAL SCORE: 14

## 2018-03-13 NOTE — TELEPHONE ENCOUNTER
Positive urine Chlamydia.      Verbal Orders received by Kristen Kehr, PA-C, patient needs to be treated with Azithromycin 1 gm for 1 time dose.  Abstain or use condoms x 1 mos.   Report to partner(s) in order for them to be treated as well.  [place] dept of public health, reportable illness.  (form completed)  Discussed STD, transmission, ways to prevent.  Offered opportunity for questions.  Patient will call back for further questions.    Per protocol, will route encounter to be cosigned by provider for Verbal Orders.  Kristi Shell RN

## 2018-03-19 ENCOUNTER — OFFICE VISIT (OUTPATIENT)
Dept: FAMILY MEDICINE | Facility: CLINIC | Age: 23
End: 2018-03-19
Payer: COMMERCIAL

## 2018-03-19 VITALS
OXYGEN SATURATION: 100 % | RESPIRATION RATE: 24 BRPM | DIASTOLIC BLOOD PRESSURE: 84 MMHG | WEIGHT: 258 LBS | SYSTOLIC BLOOD PRESSURE: 121 MMHG | TEMPERATURE: 98.7 F | BODY MASS INDEX: 39.23 KG/M2 | HEART RATE: 85 BPM

## 2018-03-19 DIAGNOSIS — Z12.4 SCREENING FOR MALIGNANT NEOPLASM OF CERVIX: ICD-10-CM

## 2018-03-19 DIAGNOSIS — Z11.3 SCREEN FOR STD (SEXUALLY TRANSMITTED DISEASE): ICD-10-CM

## 2018-03-19 DIAGNOSIS — Z00.00 ROUTINE GENERAL MEDICAL EXAMINATION AT A HEALTH CARE FACILITY: Primary | ICD-10-CM

## 2018-03-19 LAB
SPECIMEN SOURCE: NORMAL
WET PREP SPEC: NORMAL

## 2018-03-19 PROCEDURE — 36415 COLL VENOUS BLD VENIPUNCTURE: CPT | Performed by: PHYSICIAN ASSISTANT

## 2018-03-19 PROCEDURE — 87591 N.GONORRHOEAE DNA AMP PROB: CPT | Performed by: PHYSICIAN ASSISTANT

## 2018-03-19 PROCEDURE — 99395 PREV VISIT EST AGE 18-39: CPT | Performed by: PHYSICIAN ASSISTANT

## 2018-03-19 PROCEDURE — 86780 TREPONEMA PALLIDUM: CPT | Performed by: PHYSICIAN ASSISTANT

## 2018-03-19 PROCEDURE — 87210 SMEAR WET MOUNT SALINE/INK: CPT | Performed by: PHYSICIAN ASSISTANT

## 2018-03-19 PROCEDURE — G0145 SCR C/V CYTO,THINLAYER,RESCR: HCPCS | Performed by: PHYSICIAN ASSISTANT

## 2018-03-19 PROCEDURE — 86803 HEPATITIS C AB TEST: CPT | Performed by: PHYSICIAN ASSISTANT

## 2018-03-19 PROCEDURE — 87389 HIV-1 AG W/HIV-1&-2 AB AG IA: CPT | Performed by: PHYSICIAN ASSISTANT

## 2018-03-19 ASSESSMENT — PAIN SCALES - GENERAL: PAINLEVEL: NO PAIN (0)

## 2018-03-19 NOTE — PROGRESS NOTES
SUBJECTIVE:   CC: Meme Mccurdy is an 23 year old woman who presents for preventive health visit.     Physical   Annual:     Getting at least 3 servings of Calcium per day::  Yes    Bi-annual eye exam::  Yes    Dental care twice a year::  Yes    Sleep apnea or symptoms of sleep apnea::  None    Diet::  Breakfast skipped    Frequency of exercise::  2-3 days/week    Duration of exercise::  Greater than 60 minutes    Taking medications regularly::  Yes    Medication side effects::  None    Additional concerns today::  No                    Today's PHQ-2 Score:   PHQ-2 ( 1999 Pfizer) 3/19/2018   Q1: Little interest or pleasure in doing things 0   Q2: Feeling down, depressed or hopeless 1   PHQ-2 Score 1   Q1: Little interest or pleasure in doing things Not at all   Q2: Feeling down, depressed or hopeless Several days   PHQ-2 Score 1       Abuse: Current or Past(Physical, Sexual or Emotional)- No  Do you feel safe in your environment - Yes    Social History   Substance Use Topics     Smoking status: Never Smoker     Smokeless tobacco: Never Used     Alcohol use No     No flowsheet data found.No flowsheet data found.    Reviewed orders with patient.  Reviewed health maintenance and updated orders accordingly - Yes  BP Readings from Last 3 Encounters:   03/19/18 121/84   03/12/18 124/80   02/28/18 128/80    Wt Readings from Last 3 Encounters:   03/19/18 258 lb (117 kg)   03/12/18 258 lb (117 kg)   02/28/18 256 lb (116.1 kg)                  Patient Active Problem List   Diagnosis     Melanocytic nevus of face     Ovarian cyst     BMI (body mass index), pediatric, 85% to less than 95% for age     Depression with anxiety     Insomnia     Encounter for initial prescription of injectable contraceptive     Past Surgical History:   Procedure Laterality Date     HC TOOTH EXTRACTION W/FORCEP  9/08     TONSILLECTOMY Bilateral 12/19/2016    Procedure: TONSILLECTOMY;  Surgeon: Ilya Montilla MD;  Location:  OR       [facility]  History   Substance Use Topics     Smoking status: Never Smoker     Smokeless tobacco: Never Used     Alcohol use No     Family History   Problem Relation Age of Onset     Hypertension Mother      Arthritis Mother      Lipids Father      High Cholesterol     Breast Cancer Maternal Grandmother      CANCER Maternal Grandmother      DIABETES Maternal Grandfather      Hypertension Maternal Grandfather      CEREBROVASCULAR DISEASE Other      Thyroid Disease No family hx of      Glaucoma No family hx of      Macular Degeneration No family hx of          Current Outpatient Prescriptions   Medication Sig Dispense Refill     sertraline (ZOLOFT) 100 MG tablet Take 2 tablets (200 mg) by mouth daily 180 tablet 1     medroxyPROGESTERone (DEPO-PROVERA) 150 MG/ML injection Inject 1 mL (150 mg) into the muscle every 3 months 1 mL 3     famotidine (PEPCID) 40 MG tablet Take 1 tablet (40 mg) by mouth At Bedtime 30 tablet 3     ibuprofen (ADVIL,MOTRIN) 800 MG tablet Take 1 tablet by mouth every 8 hours as needed for pain. 60 tablet 1       Mammogram not appropriate for this patient based on age.    Pertinent mammograms are reviewed under the imaging tab.  History of abnormal Pap smear: NO - age 21-29 PAP every 3 years recommended    Reviewed and updated as needed this visit by clinical staff  Tobacco  Allergies  Meds  Med Hx  Surg Hx  Fam Hx  Soc Hx        Reviewed and updated as needed this visit by Provider        Past Medical History:   Diagnosis Date     Allergic rhinitis      Allergies     dog     BMI (body mass index), pediatric, 85% to less than 95% for age 1/3/2013     Chronic GERD      Eczema      Melanocytic nevus of face 12/2009      Past Surgical History:   Procedure Laterality Date     HC TOOTH EXTRACTION W/FORCEP  9/08     TONSILLECTOMY Bilateral 12/19/2016    Procedure: TONSILLECTOMY;  Surgeon: Ilya Montilla MD;  Location:  OR       Review of Systems  C: NEGATIVE for fever, chills, change in weight  I:  NEGATIVE for worrisome rashes, moles or lesions  E: NEGATIVE for vision changes or irritation  ENT: NEGATIVE for ear, mouth and throat problems  R: NEGATIVE for significant cough or SOB  B: NEGATIVE for masses, tenderness or discharge  CV: NEGATIVE for chest pain, palpitations or peripheral edema  GI: NEGATIVE for nausea, abdominal pain, heartburn, or change in bowel habits  : NEGATIVE for unusual urinary or vaginal symptoms. Periods are regular.  Recently treated for chlamydia, needs further STD screening  M: NEGATIVE for significant arthralgias or myalgia  N: NEGATIVE for weakness, dizziness or paresthesias  P: NEGATIVE for changes in mood or affect     OBJECTIVE:   /84  Pulse 85  Temp 98.7  F (37.1  C) (Oral)  Resp 24  Wt 258 lb (117 kg)  SpO2 100%  BMI 39.23 kg/m2  Physical Exam  GENERAL: healthy, alert and no distress  EYES: Eyes grossly normal to inspection, PERRL and conjunctivae and sclerae normal  HENT: ear canals and TM's normal, nose and mouth without ulcers or lesions  NECK: no adenopathy, no asymmetry, masses, or scars and thyroid normal to palpation  RESP: lungs clear to auscultation - no rales, rhonchi or wheezes  BREAST: normal without masses, tenderness or nipple discharge and no palpable axillary masses or adenopathy  CV: regular rate and rhythm, normal S1 S2, no S3 or S4, no murmur, click or rub, no peripheral edema and peripheral pulses strong  ABDOMEN: soft, nontender, no hepatosplenomegaly, no masses and bowel sounds normal   (female): normal female external genitalia, normal urethral meatus, vaginal mucosa pink, moist, well rugated, and normal cervix/adnexa/uterus without masses or discharge  MS: no gross musculoskeletal defects noted, no edema  SKIN: no suspicious lesions or rashes  NEURO: Normal strength and tone, mentation intact and speech normal  PSYCH: mentation appears normal, affect normal/bright    ASSESSMENT/PLAN:   1. Routine general medical examination at a [facility]  "care facility  Health maintenance reviewed and updated.      2. Screening for malignant neoplasm of cervix  - PAP imaged thin layer screen only - recommended age 21 - 24 years    3. Screen for STD (sexually transmitted disease)  - Anti Treponema  - Neisseria gonorrhoeae PCR  - Hepatitis C antibody  - HIV Antigen Antibody Combo  - Wet prep    COUNSELING:  Reviewed preventive health counseling, as reflected in patient instructions       Regular exercise       Healthy diet/nutrition       Contraception       Safe sex practices/STD prevention       reports that she has never smoked. She has never used smokeless tobacco.    Estimated body mass index is 39.23 kg/(m^2) as calculated from the following:    Height as of 3/12/18: 5' 8\" (1.727 m).    Weight as of this encounter: 258 lb (117 kg).   Weight management plan: Discussed healthy diet and exercise guidelines and patient will follow up in 12 months in clinic to re-evaluate.    Counseling Resources:  ATP IV Guidelines  Pooled Cohorts Equation Calculator  Breast Cancer Risk Calculator  FRAX Risk Assessment  ICSI Preventive Guidelines  Dietary Guidelines for Americans, 2010  USDA's MyPlate  ASA Prophylaxis  Lung CA Screening    Kristen M. Kehr, PA-C  [facility]  "

## 2018-03-19 NOTE — NURSING NOTE
"Chief Complaint   Patient presents with     Physical       Initial /84  Pulse 85  Temp 98.7  F (37.1  C) (Oral)  Resp 24  Wt 258 lb (117 kg)  SpO2 100%  BMI 39.23 kg/m2 Estimated body mass index is 39.23 kg/(m^2) as calculated from the following:    Height as of 3/12/18: 5' 8\" (1.727 m).    Weight as of this encounter: 258 lb (117 kg).  Medication Reconciliation: complete    JYOTI Marquez MA    "

## 2018-03-19 NOTE — LETTER
March 22, 2018      Meme ULISES Mccurdy  [address] [facility] [address]    Dear ,      I am happy to inform you that your recent cervical cancer screening test (PAP smear) was normal.      Preventative screenings such as this help to ensure your health for years to come. You should repeat a pap smear in 3 years, unless otherwise directed.      You will still need to return to the clinic every year for your annual exam and other preventive tests.     Please contact the clinic at [phone] if you have further questions.       Sincerely,      Kristen M. Kehr, PA-C/adithya

## 2018-03-19 NOTE — MR AVS SNAPSHOT
After Visit Summary   3/19/2018    Meme Mccurdy    MRN: [id number]           Patient Information     Date Of Birth          1995        Visit Information        Provider Department      3/19/2018 12:00 PM Kehr, Kristen M, PA-C [facility]        Today's Diagnoses     Routine general medical examination at a health care facility    -  1    Screening for malignant neoplasm of cervix        Screen for STD (sexually transmitted disease)          Care Instructions      Preventive Health Recommendations  Female Ages 18 to 25     Yearly exam:     See your health care provider every year in order to  o Review health changes.   o Discuss preventive care.    o Review your medicines if your doctor has prescribed any.      You should be tested each year for STDs (sexually transmitted diseases).       After age 20, talk to your provider about how often you should have cholesterol testing.      Starting at age 21, get a Pap test every three years. If you have an abnormal result, your doctor may have you test more often.      If you are at risk for diabetes, you should have a diabetes test (fasting glucose).     Shots:     Get a flu shot each year.     Get a tetanus shot every 10 years.     Consider getting the shot (vaccine) that prevents cervical cancer (Gardasil).    Nutrition:     Eat at least 5 servings of fruits and vegetables each day.    Eat whole-grain bread, whole-wheat pasta and brown rice instead of white grains and rice.    Talk to your provider about Calcium and Vitamin D.     Lifestyle    Exercise at least 150 minutes a week each week (30 minutes a day, 5 days a week). This will help you control your weight and prevent disease.    Limit alcohol to one drink per day.    No smoking.     Wear sunscreen to prevent skin cancer.    See your dentist every six months for an exam and cleaning.          Follow-ups after your visit        Who to contact     If you have questions or need follow  "up information about today's clinic visit or your schedule please contact [facility] directly at [phone].  Normal or non-critical lab and imaging results will be communicated to you by MyChart, letter or phone within 4 business days after the clinic has received the results. If you do not hear from us within 7 days, please contact the clinic through [facility] or phone. If you have a critical or abnormal lab result, we will notify you by phone as soon as possible.  Submit refill requests through [facility] or call your pharmacy and they will forward the refill request to us. Please allow 3 business days for your refill to be completed.          Additional Information About Your Visit        MyChart Information     [facility] lets you send messages to your doctor, view your test results, renew your prescriptions, schedule appointments and more. To sign up, go to [email or web address] . Click on \"Log in\" on the left side of the screen, which will take you to the Welcome page. Then click on \"Sign up Now\" on the right side of the page.     You will be asked to enter the access code listed below, as well as some personal information. Please follow the directions to create your username and password.     Your access code is: [email or web address]  Expires: 6/10/2018 10:06 AM     Your access code will  in 90 days. If you need help or a new code, please call your [place] clinic or [phone].        Care EveryWhere ID     This is your Care EveryWhere ID. This could be used by other organizations to access your [place] medical records  [id number]        Your Vitals Were     Pulse Temperature Respirations Pulse Oximetry BMI (Body Mass Index)       85 98.7  F (37.1  C) (Oral) 24 100% 39.23 kg/m2        Blood Pressure from Last 3 Encounters:   18 121/84   18 124/80   18 128/80    Weight from Last 3 Encounters:   18 258 lb (117 kg)   18 258 lb (117 kg)   18 256 lb (116.1 kg)    "           We Performed the Following     Anti Treponema     Hepatitis C antibody     HIV Antigen Antibody Combo     Neisseria gonorrhoeae PCR     PAP imaged thin layer screen only - recommended age 21 - 24 years     Wet prep        Primary Care Provider Office Phone # Fax #    Kristen M Kehr, PA-C [phone] [address] [facility] [address]        Equal Access to Services     [facility]: Hadii aad ku hadasho Soomaali, waaxda luqadaha, qaybta kaalmada adeegyada, waxay idiin hayaan adeeg kharash la'aan . So [facility] [phone].    ATENCIÓN: Si habla español, tiene a prabhakar disposición servicios gratuitos de asistencia lingüística. Llame al [phone].    We comply with applicable federal civil rights laws and [place] laws. We do not discriminate on the basis of race, color, national origin, age, disability, sex, sexual orientation, or gender identity.            Thank you!     Thank you for choosing [facility]  for your care. Our goal is always to provide you with excellent care. Hearing back from our patients is one way we can continue to improve our services. Please take a few minutes to complete the written survey that you may receive in the mail after your visit with us. Thank you!             Your Updated Medication List - Protect others around you: Learn how to safely use, store and throw away your medicines at www.disposemymeds.org.          This list is accurate as of 3/19/18 12:34 PM.  Always use your most recent med list.                   Brand Name Dispense Instructions for use Diagnosis    famotidine 40 MG tablet    PEPCID    30 tablet    Take 1 tablet (40 mg) by mouth At Bedtime    Gastroesophageal reflux disease without esophagitis       ibuprofen 800 MG tablet    ADVIL/MOTRIN    60 tablet    Take 1 tablet by mouth every 8 hours as needed for pain.    Follow-up exam after treatment       medroxyPROGESTERone 150 MG/ML injection    DEPO-PROVERA    1 mL    Inject  1 mL (150 mg) into the muscle every 3 months    Encounter for initial prescription of injectable contraceptive       sertraline 100 MG tablet    ZOLOFT    180 tablet    Take 2 tablets (200 mg) by mouth daily    Depression with anxiety

## 2018-03-19 NOTE — LETTER
[facility]  [address] [facility] [address] [phone]        March 22, 2018    Fatmata Mccurdy  [address] [facility] [address]            Dear Fatmata,    The results of your recent tests were normal.  Below is a copy of the results.  It was a pleasure to see you at your last appointment.    If you have any questions or concerns, please call myself or my nurse at [phone].    Sincerely,    Kristen Kehr, PA-C /kathy    Results for orders placed or performed in visit on 03/19/18   PAP imaged thin layer screen only - recommended age 21 - 24 years   Result Value Ref Range    PAP NIL     Copath Report         Patient Name: FATMATA MCCURDY  MR#: [id number]  Specimen #: [id number]  Collected: 3/19/2018  Received: 3/20/2018  Reported: 3/21/2018 14:01  Ordering Phy(s): KRISTEN M KEHR    For improved result formatting, select 'View Enhanced Report Format' under   Linked Documents section.    SPECIMEN/STAIN PROCESS:  Pap imaged thin layer prep screening (Surepath, FocalPoint with guided   screening)       Pap-Cyto x 1    SOURCE: Cervical, endocervical  ----------------------------------------------------------------   Pap imaged thin layer prep screening (Surepath, FocalPoint with guided   screening)  SPECIMEN ADEQUACY:  Satisfactory for evaluation.  -Transformation zone component present.    CYTOLOGIC INTERPRETATION:    Negative for intraepithelial lesion or malignancy    Electronically signed out by:  ZOHRA Sims (ASCP)    Processed and screened at [facility],   [facility]    CLINICAL HISTORY:    Currently not having periods, Birth control,    Pap anicolaou Test Limitations:  Cervical cytology is a screening test with   limited sensitivity; regular  screening is critical for cancer prevention; Pap tests are primarily   effective for the diagnosis/prevention of  squamous cell carcinoma, not adenocarcinomas or other cancers.    TESTING LAB  LOCATION:  [address]  [phone]    COLLECTION SITE:  Client:  [facility] [place]  Location: [facility] (B)     Anti Treponema   Result Value Ref Range    Treponema pallidum Antibody Negative NEG^Negative   Hepatitis C antibody   Result Value Ref Range    Hepatitis C Antibody Nonreactive NR^Nonreactive   HIV Antigen Antibody Combo   Result Value Ref Range    HIV Antigen Antibody Combo Nonreactive NR^Nonreactive       Neisseria gonorrhoeae PCR   Result Value Ref Range    Specimen Descrip Cervix     N Gonorrhea PCR Negative NEG^Negative   Wet prep   Result Value Ref Range    Specimen Description Vagina     Wet Prep No Trichomonas seen     Wet Prep No clue cells seen     Wet Prep No yeast seen

## 2018-03-20 LAB
HCV AB SERPL QL IA: NONREACTIVE
HIV 1+2 AB+HIV1 P24 AG SERPL QL IA: NONREACTIVE
N GONORRHOEA DNA SPEC QL NAA+PROBE: NEGATIVE
SPECIMEN SOURCE: NORMAL
T PALLIDUM IGG+IGM SER QL: NEGATIVE

## 2018-03-21 LAB
COPATH REPORT: NORMAL
PAP: NORMAL

## 2018-03-21 NOTE — PROGRESS NOTES
Please send a letter with negative sexually transmitted infection screening tests.   Kristen Kehr PA-C

## 2018-04-30 ENCOUNTER — OFFICE VISIT (OUTPATIENT)
Dept: ORTHOPEDICS | Facility: CLINIC | Age: 23
End: 2018-04-30
Payer: COMMERCIAL

## 2018-04-30 VITALS
HEIGHT: 68 IN | SYSTOLIC BLOOD PRESSURE: 118 MMHG | WEIGHT: 267.2 LBS | BODY MASS INDEX: 40.5 KG/M2 | DIASTOLIC BLOOD PRESSURE: 64 MMHG

## 2018-04-30 DIAGNOSIS — M25.511 CHRONIC RIGHT SHOULDER PAIN: ICD-10-CM

## 2018-04-30 DIAGNOSIS — M75.101 ROTATOR CUFF SYNDROME, RIGHT: ICD-10-CM

## 2018-04-30 DIAGNOSIS — S49.91XA INJURY OF GLENOID LABRUM, RIGHT, INITIAL ENCOUNTER: Primary | ICD-10-CM

## 2018-04-30 DIAGNOSIS — G89.29 CHRONIC RIGHT SHOULDER PAIN: ICD-10-CM

## 2018-04-30 PROCEDURE — 99214 OFFICE O/P EST MOD 30 MIN: CPT | Performed by: PEDIATRICS

## 2018-04-30 NOTE — MR AVS SNAPSHOT
After Visit Summary   4/30/2018    Meme Mccurdy    MRN: [id number]           Patient Information     Date Of Birth          1995        Visit Information        Provider Department      4/30/2018 2:00 PM Fannie Edmond MD [facility]        Today's Diagnoses     Injury of glenoid labrum, right, initial encounter    -  1    Chronic right shoulder pain        Rotator cuff syndrome, right          Care Instructions    Plan:  - Today's Plan of Care:  Referral to ortho surgeon    -We also discussed other future treatment options:  Injection with Dr. Murpyh or PT    Follow Up: as needed    If you have any further questions for your physician or physician s care team you can call [phone] and use option 3 to leave a voice message. Calls received during business hours will be returned same day.            Follow-ups after your visit        Additional Services     ORTHO  REFERRAL       [facility] is referring you to the Orthopedic  Services at [facility].       The  Representative will assist you in the coordination of your Orthopedic and Musculoskeletal Care as prescribed by your physician.    The  Representative will call you within 1 business day to help schedule your appointment, or you may contact the  Representative at:    All areas ~ [phone]     Type of Referral : Surgical / Specialist -- shoulder surgeon      Timeframe requested: Routine    Coverage of these services is subject to the terms and limitations of your health insurance plan.  Please call member services at your health plan with any benefit or coverage questions.      If X-rays, CT or MRI's have been performed, please contact the facility where they were done to arrange for , prior to your scheduled appointment.  Please bring this referral request to your appointment and present it to your specialist.       "            Who to contact     If you have questions or need follow up information about today's clinic visit or your schedule please contact [facility] directly at [phone].  Normal or non-critical lab and imaging results will be communicated to you by MyChart, letter or phone within 4 business days after the clinic has received the results. If you do not hear from us within 7 days, please contact the clinic through MyChart or phone. If you have a critical or abnormal lab result, we will notify you by phone as soon as possible.  Submit refill requests through [employer] or call your pharmacy and they will forward the refill request to us. Please allow 3 business days for your refill to be completed.          Additional Information About Your Visit        [email or web address] Information     [facility] lets you send messages to your doctor, view your test results, renew your prescriptions, schedule appointments and more. To sign up, go to [email or web address] . Click on \"Log in\" on the left side of the screen, which will take you to the Welcome page. Then click on \"Sign up Now\" on the right side of the page.     You will be asked to enter the access code listed below, as well as some personal information. Please follow the directions to create your username and password.     Your access code is: [email or web address]  Expires: 6/10/2018 10:06 AM     Your access code will  in 90 days. If you need help or a new code, please call your [place] clinic or [phone].        Care EveryWhere ID     This is your Care EveryWhere ID. This could be used by other organizations to access your [place] medical records  [id number]        Your Vitals Were     Height BMI (Body Mass Index)                5' 8\" (1.727 m) 40.63 kg/m2           Blood Pressure from Last 3 Encounters:   18 118/64   18 121/84   18 124/80    Weight from Last 3 Encounters:   18 267 lb 3.2 oz (121.2 kg)   18 " 258 lb (117 kg)   03/12/18 258 lb (117 kg)              We Performed the Following     ORTHO  REFERRAL        Primary Care Provider Office Phone # Fax #    Kristen M Kehr, PA-C [phone] [address] [facility] [address]        Equal Access to Services     [facility] : Hadii aad ku hadasho Soomaali, waaxda luqadaha, qaybta kaalmada adeegyada, waxay idiin hayaan adeeg khscottsh la'aan . So [facility] [phone].    ATENCIÓN: Si habla español, tiene a prabhakar disposición servicios gratuitos de asistencia lingüística. Marta al [phone].    We comply with applicable federal civil rights laws and [place] laws. We do not discriminate on the basis of race, color, national origin, age, disability, sex, sexual orientation, or gender identity.            Thank you!     Thank you for choosing [facility]  for your care. Our goal is always to provide you with excellent care. Hearing back from our patients is one way we can continue to improve our services. Please take a few minutes to complete the written survey that you may receive in the mail after your visit with us. Thank you!             Your Updated Medication List - Protect others around you: Learn how to safely use, store and throw away your medicines at www.disposemymeds.org.          This list is accurate as of 4/30/18  2:56 PM.  Always use your most recent med list.                   Brand Name Dispense Instructions for use Diagnosis    famotidine 40 MG tablet    PEPCID    30 tablet    Take 1 tablet (40 mg) by mouth At Bedtime    Gastroesophageal reflux disease without esophagitis       ibuprofen 800 MG tablet    ADVIL/MOTRIN    60 tablet    Take 1 tablet by mouth every 8 hours as needed for pain.    Follow-up exam after treatment       medroxyPROGESTERone 150 MG/ML injection    DEPO-PROVERA    1 mL    Inject 1 mL (150 mg) into the muscle every 3 months    Encounter for initial prescription of  injectable contraceptive       sertraline 100 MG tablet    ZOLOFT    180 tablet    Take 2 tablets (200 mg) by mouth daily    Depression with anxiety

## 2018-04-30 NOTE — PROGRESS NOTES
Sports Medicine Clinic Visit    PCP: Kehr, Kristen M Cassie J Kaz is a 23 year old female who is seen  as a self referral presenting with right shoulder pain.    Injury: Patient reports chronic progressive right shoulder pain from fast pitch softball and rugby.  Previously saw Dr. Murphy around 1 year ago.  Patient reports her pain did improve with physical therapy, however returned when she started playing rugby again.  Currently the pain is at the top of her shoulder or behind.  It will come at random times including running.  Also has pain with sleeping on it.    Location of Pain: right shoulder  Duration of Pain: chronic  Rating of Pain at worst: 10/10  Rating of Pain Currently: 6/10  Symptoms are better with: Physical therapy, ice, ibuprofen  Symptoms are worse with: Sleeping on right side, overhead movements, running, repetitive movements  Additional Features:   Positive: popping, grinding, catching, numbness (shoulder area) and weakness   Negative: swelling, bruising, instability, paresthesias, pain in other joints and systemic symptoms  Other evaluation and/or treatments so far consists of: Ice, Ibuprofen and PT  Prior History of related problems: Chronic right shoulder pain    Social History: Special , slow pitch softball    Review of Systems  Skin: no bruising, no swelling  Musculoskeletal: as above  Neurologic: no numbness, paresthesias  Remainder of review of systems is negative including constitutional, CV, pulmonary, GI, except as noted in HPI or medical history.    Patient's current problem list, past medical and surgical history, and family history were reviewed.    Patient Active Problem List   Diagnosis     Melanocytic nevus of face     Ovarian cyst     BMI (body mass index), pediatric, 85% to less than 95% for age     Depression with anxiety     Insomnia     Encounter for initial prescription of injectable contraceptive     Past Medical History:   Diagnosis Date     Allergic rhinitis   "    Allergies     dog     BMI (body mass index), pediatric, 85% to less than 95% for age 1/3/2013     Chronic GERD      Eczema      Melanocytic nevus of face 12/2009     Past Surgical History:   Procedure Laterality Date     HC TOOTH EXTRACTION W/FORCEP  9/08     TONSILLECTOMY Bilateral 12/19/2016    Procedure: TONSILLECTOMY;  Surgeon: Ilya Montilla MD;  Location: [facility] OR     Family History   Problem Relation Age of Onset     Hypertension Mother      Arthritis Mother      Lipids Father      High Cholesterol     Breast Cancer Maternal Grandmother      CANCER Maternal Grandmother      DIABETES Maternal Grandfather      Hypertension Maternal Grandfather      CEREBROVASCULAR DISEASE Other      Thyroid Disease No family hx of      Glaucoma No family hx of      Macular Degeneration No family hx of          Objective  /64 (BP Location: Right arm, Patient Position: Sitting, Cuff Size: Adult Regular)  Ht 5' 8\" (1.727 m)  Wt 267 lb 3.2 oz (121.2 kg)  BMI 40.63 kg/m2    GENERAL APPEARANCE: healthy, alert and no distress   GAIT: NORMAL  SKIN: no suspicious lesions or rashes  HEENT: Sclera clear, anicteric  CV: good peripheral pulses  RESP: Breathing not labored  NEURO: Normal strength and tone, mentation intact and speech normal  PSYCH:  mentation appears normal and affect normal/bright    Bilateral Shoulder exam    Inspection and Posture:       rounded shoulders and upper back    Skin:        no visible deformities    Tender:        none    Non Tender:       remainder of shoulder bilateral    ROM:        Full active and passive ROM with flexion, extension, abduction, internal and external rotation bilateral       asymmetric scapular motion    Painful motions:       end range flexion and elevation right    Strength:        abduction 5/5 bilateral       flexion 5/5 bilateral       internal rotation 5/5 bilateral       external rotation 5/5 bilateral    Impingement testing:       positive (+) Neer right       " positive (+) Fox right       positive (+) O'aislinn right    Sensation:        normal sensation over shoulder and upper extremity       Radiology  I ordered, visualized and reviewed these images with the patient:  2/17/17  EXAM: MR Right Shoulder with contrast  TECHNIQUE: Multiplanar, multisequence imaging of the right shoulder  were obtained after administration of intra-articular gadolinium  contrast using routine protocol.  History: evaluate shoulder for causes of chronic pain, possible RTC  tendinopathy, low suspicion for full-thickness injury, more suspicious  of labral injury, Pain in right shoulder, Other chronic pain,  Unspecified injury of right shoulder and upper arm, initial encounter,  Unspecified rotator cuff tear or rupture of right shoulder, not  specified as traumatic  Comparison: None available.  Findings:  ROTATOR CUFF and ASSOCIATED STRUCTURES  Rotator cuff: Supraspinatus, infraspinatus, teres minor and  subscapularis tendons are intact. Mild bursal sided fraying of the  infraspinatus/supraspinatus junctional fibers near the footprint.  Bursa: Trace subacromial/subdeltoid native bursal fluid.  Musculature: Muscle bulk of rotator cuff is preserved.  Deltoid muscle  bulk is also preserved.  No muscle edema.  Acromioclavicular joint  There are mild degenerative changes of the acromioclavicular joint.  Acromion is type 1 in sagittal morphology.  Coracoacromial ligament is  not thickened.  OSSEOUS STRUCTURES  No fracture, marrow contusion or marrow infiltration.  LONG BICIPITAL TENDON  The long head of the biceps tendon is normally situated within the  bicipital groove. No complete or partial biceps tendon tear is  present.  GLENOHUMERAL JOINT  Joint fluid: Physiologic amount of joint fluid is  present.  Cartilage and subarticular bone:  No focal hyaline cartilage defects  are noted. No Hill-Sachs, reverse Hill-Sachs, or bony Bankart lesions  are seen.  Labrum: Mild fraying of the anterior superior  labrum (image 11 and 12  series 5). Otherwise labrum is intact.  ANCILLARY FINDINGS:  Anterior soft tissue edema signal in keeping with iatrogenic access  related change.  Impression:  1. Mild fraying of the anterior superior labrum.  2. Mild bursal sided fraying of the infraspinatus/supraspinatus  junctional fibers near the footprint.    Assessment:  1. Injury of glenoid labrum, right, initial encounter    2. Chronic right shoulder pain    3. Rotator cuff syndrome, right      Reviewed images and exam.  We discussed the following treatment options: symptom treatment, activity modification/rest, injection, rehab and referral. Following discussion, plan: At this point patient would like to review her options with orthopedic surgery, knowing that injection and additional physical therapy may be next recommended step.    Plan:  - Today's Plan of Care:  Referral to ortho surgeon    -We also discussed other future treatment options:  Injection with Dr. Murphy or PT    Follow Up: as needed    Concerning signs and symptoms were reviewed.  The patient expressed understanding of this management plan and all questions were answered at this time.    Fannie Edmond MD CA  Primary Care Sports Medicine  [facility]

## 2018-04-30 NOTE — LETTER
4/30/2018         RE: Meme Mccurdy  [address] [facility] [address]        Dear Colleague,    Thank you for referring your patient, Meme Mccurdy, to the [facility] [place]. Please see a copy of my visit note below.    Sports Medicine Clinic Visit    PCP: Kehr, Kristen M    Meme Mccurdy is a 23 year old female who is seen  as a self referral presenting with right shoulder pain.    Injury: Patient reports chronic progressive right shoulder pain from fast pitch softball and rugby.  Previously saw Dr. Murphy around 1 year ago.  Patient reports her pain did improve with physical therapy, however returned when she started playing rugby again.  Currently the pain is at the top of her shoulder or behind.  It will come at random times including running.  Also has pain with sleeping on it.    Location of Pain: right shoulder  Duration of Pain: chronic  Rating of Pain at worst: 10/10  Rating of Pain Currently: 6/10  Symptoms are better with: Physical therapy, ice, ibuprofen  Symptoms are worse with: Sleeping on right side, overhead movements, running, repetitive movements  Additional Features:   Positive: popping, grinding, catching, numbness (shoulder area) and weakness   Negative: swelling, bruising, instability, paresthesias, pain in other joints and systemic symptoms  Other evaluation and/or treatments so far consists of: Ice, Ibuprofen and PT  Prior History of related problems: Chronic right shoulder pain    Social History: Special , slow pitch softball    Review of Systems  Skin: no bruising, no swelling  Musculoskeletal: as above  Neurologic: no numbness, paresthesias  Remainder of review of systems is negative including constitutional, CV, pulmonary, GI, except as noted in HPI or medical history.    Patient's current problem list, past medical and surgical history, and family history were reviewed.    Patient Active Problem List   Diagnosis     Melanocytic nevus of  "face     Ovarian cyst     BMI (body mass index), pediatric, 85% to less than 95% for age     Depression with anxiety     Insomnia     Encounter for initial prescription of injectable contraceptive     Past Medical History:   Diagnosis Date     Allergic rhinitis      Allergies     dog     BMI (body mass index), pediatric, 85% to less than 95% for age 1/3/2013     Chronic GERD      Eczema      Melanocytic nevus of face 12/2009     Past Surgical History:   Procedure Laterality Date     HC TOOTH EXTRACTION W/FORCEP  9/08     TONSILLECTOMY Bilateral 12/19/2016    Procedure: TONSILLECTOMY;  Surgeon: Ilya Montilla MD;  Location: [facility] OR     Family History   Problem Relation Age of Onset     Hypertension Mother      Arthritis Mother      Lipids Father      High Cholesterol     Breast Cancer Maternal Grandmother      CANCER Maternal Grandmother      DIABETES Maternal Grandfather      Hypertension Maternal Grandfather      CEREBROVASCULAR DISEASE Other      Thyroid Disease No family hx of      Glaucoma No family hx of      Macular Degeneration No family hx of          Objective  /64 (BP Location: Right arm, Patient Position: Sitting, Cuff Size: Adult Regular)  Ht 5' 8\" (1.727 m)  Wt 267 lb 3.2 oz (121.2 kg)  BMI 40.63 kg/m2    GENERAL APPEARANCE: healthy, alert and no distress   GAIT: NORMAL  SKIN: no suspicious lesions or rashes  HEENT: Sclera clear, anicteric  CV: good peripheral pulses  RESP: Breathing not labored  NEURO: Normal strength and tone, mentation intact and speech normal  PSYCH:  mentation appears normal and affect normal/bright    Bilateral Shoulder exam    Inspection and Posture:       rounded shoulders and upper back    Skin:        no visible deformities    Tender:        none    Non Tender:       remainder of shoulder bilateral    ROM:        Full active and passive ROM with flexion, extension, abduction, internal and external rotation bilateral       asymmetric scapular motion    Painful " motions:       end range flexion and elevation right    Strength:        abduction 5/5 bilateral       flexion 5/5 bilateral       internal rotation 5/5 bilateral       external rotation 5/5 bilateral    Impingement testing:       positive (+) Neer right       positive (+) Fox right       positive (+) O'aislinn right    Sensation:        normal sensation over shoulder and upper extremity       Radiology  I ordered, visualized and reviewed these images with the patient:  2/17/17  EXAM: MR Right Shoulder with contrast  TECHNIQUE: Multiplanar, multisequence imaging of the right shoulder  were obtained after administration of intra-articular gadolinium  contrast using routine protocol.  History: evaluate shoulder for causes of chronic pain, possible RTC  tendinopathy, low suspicion for full-thickness injury, more suspicious  of labral injury, Pain in right shoulder, Other chronic pain,  Unspecified injury of right shoulder and upper arm, initial encounter,  Unspecified rotator cuff tear or rupture of right shoulder, not  specified as traumatic  Comparison: None available.  Findings:  ROTATOR CUFF and ASSOCIATED STRUCTURES  Rotator cuff: Supraspinatus, infraspinatus, teres minor and  subscapularis tendons are intact. Mild bursal sided fraying of the  infraspinatus/supraspinatus junctional fibers near the footprint.  Bursa: Trace subacromial/subdeltoid native bursal fluid.  Musculature: Muscle bulk of rotator cuff is preserved.  Deltoid muscle  bulk is also preserved.  No muscle edema.  Acromioclavicular joint  There are mild degenerative changes of the acromioclavicular joint.  Acromion is type 1 in sagittal morphology.  Coracoacromial ligament is  not thickened.  OSSEOUS STRUCTURES  No fracture, marrow contusion or marrow infiltration.  LONG BICIPITAL TENDON  The long head of the biceps tendon is normally situated within the  bicipital groove. No complete or partial biceps tendon tear is  present.  GLENOHUMERAL  JOINT  Joint fluid: Physiologic amount of joint fluid is  present.  Cartilage and subarticular bone:  No focal hyaline cartilage defects  are noted. No Hill-Sachs, reverse Hill-Sachs, or bony Bankart lesions  are seen.  Labrum: Mild fraying of the anterior superior labrum (image 11 and 12  series 5). Otherwise labrum is intact.  ANCILLARY FINDINGS:  Anterior soft tissue edema signal in keeping with iatrogenic access  related change.  Impression:  1. Mild fraying of the anterior superior labrum.  2. Mild bursal sided fraying of the infraspinatus/supraspinatus  junctional fibers near the footprint.    Assessment:  1. Injury of glenoid labrum, right, initial encounter    2. Chronic right shoulder pain    3. Rotator cuff syndrome, right      Reviewed images and exam.  We discussed the following treatment options: symptom treatment, activity modification/rest, injection, rehab and referral. Following discussion, plan: At this point patient would like to review her options with orthopedic surgery, knowing that injection and additional physical therapy may be next recommended step.    Plan:  - Today's Plan of Care:  Referral to ortho surgeon    -We also discussed other future treatment options:  Injection with Dr. Murphy or PT    Follow Up: as needed    Concerning signs and symptoms were reviewed.  The patient expressed understanding of this management plan and all questions were answered at this time.    Fannie Edmond MD [facility]  Primary Care Sports Medicine  [facility]    Again, thank you for allowing me to participate in the care of your patient.        Sincerely,        Fannie Edmond MD

## 2018-04-30 NOTE — PATIENT INSTRUCTIONS
Plan:  - Today's Plan of Care:  Referral to ortho surgeon    -We also discussed other future treatment options:  Injection with Dr. Murphy or PT    Follow Up: as needed    If you have any further questions for your physician or physician s care team you can call [phone] and use option 3 to leave a voice message. Calls received during business hours will be returned same day.

## 2018-05-10 ENCOUNTER — RADIANT APPOINTMENT (OUTPATIENT)
Dept: GENERAL RADIOLOGY | Facility: CLINIC | Age: 23
End: 2018-05-10
Attending: ORTHOPAEDIC SURGERY
Payer: COMMERCIAL

## 2018-05-10 ENCOUNTER — PRE VISIT (OUTPATIENT)
Dept: ORTHOPEDICS | Facility: CLINIC | Age: 23
End: 2018-05-10

## 2018-05-10 ENCOUNTER — OFFICE VISIT (OUTPATIENT)
Dept: ORTHOPEDICS | Facility: CLINIC | Age: 23
End: 2018-05-10
Payer: COMMERCIAL

## 2018-05-10 VITALS — OXYGEN SATURATION: 98 % | BODY MASS INDEX: 40.32 KG/M2 | WEIGHT: 266 LBS | HEART RATE: 92 BPM | HEIGHT: 68 IN

## 2018-05-10 DIAGNOSIS — G89.29 CHRONIC RIGHT SHOULDER PAIN: Primary | ICD-10-CM

## 2018-05-10 DIAGNOSIS — M25.511 CHRONIC RIGHT SHOULDER PAIN: ICD-10-CM

## 2018-05-10 DIAGNOSIS — M25.511 CHRONIC RIGHT SHOULDER PAIN: Primary | ICD-10-CM

## 2018-05-10 DIAGNOSIS — G89.29 CHRONIC RIGHT SHOULDER PAIN: ICD-10-CM

## 2018-05-10 DIAGNOSIS — S49.91XA INJURY OF RIGHT ACROMIOCLAVICULAR JOINT, INITIAL ENCOUNTER: Primary | ICD-10-CM

## 2018-05-10 PROCEDURE — 73030 X-RAY EXAM OF SHOULDER: CPT | Mod: RT | Performed by: RADIOLOGY

## 2018-05-10 PROCEDURE — 99203 OFFICE O/P NEW LOW 30 MIN: CPT | Mod: GC | Performed by: ORTHOPAEDIC SURGERY

## 2018-05-10 NOTE — LETTER
5/10/2018         RE: Meme Mccurdy  [address] [facility] [address]        Dear Colleague,    Thank you for referring your patient, Meme Mccurdy, to the [facility]. Please see a copy of my visit note below.    REFERRING PROVIDER: Fannie Edmond MD    CHIEF CONCERN: Right shoulder pain    HISTORY:   Meme Mccurdy is a 23 year old right hand dominant female who presents for further evaluation of right shoulder pain symptoms.  She describes playing softball with 10 years of pitching followed by several years of playing rugby leading to gradually progressive right shoulder pain symptoms.  More recently her symptoms have progressed the point that she has avoided sports and has stopped playing rugby.  She has specific difficulty with lifting and even light activities such as running due to pain in the shoulder.  She has specific difficulty sleeping on the right side and has some symptoms even at rest.  She has tried ibuprofen and a course of physical therapy with minimal benefit.  She presents today for further evaluation and discussion of the available treatment options.    In clinic today she notes that her anterior shoulder is most uncomfortable and exacerbated by holding the arm outstretched or lifting overhead.  Her symptoms are improved by activity modification and ibuprofen.    PAST MEDICAL HISTORY: (Reviewed with the patient and in the medical record)  1. Depression    Past Medical History:   Diagnosis Date     Allergic rhinitis      Allergies     dog     BMI (body mass index), pediatric, 85% to less than 95% for age 1/3/2013     Chronic GERD      Eczema      Melanocytic nevus of face 12/2009       PAST SURGICAL HISTORY: (Reviewed with the patient and in the medical record)  1. Tonsillectomy    Past Surgical History:   Procedure Laterality Date     HC TOOTH EXTRACTION W/FORCEP  9/08     TONSILLECTOMY Bilateral 12/19/2016    Procedure: TONSILLECTOMY;  Surgeon: Ilya Montilla  MD Norris;  Location: [facility] OR       MEDICATIONS: (Reviewed with the patient and in the medical record)      Current Outpatient Prescriptions   Medication     famotidine (PEPCID) 40 MG tablet     ibuprofen (ADVIL,MOTRIN) 800 MG tablet     medroxyPROGESTERone (DEPO-PROVERA) 150 MG/ML injection     sertraline (ZOLOFT) 100 MG tablet     No current facility-administered medications for this visit.        ALLERGIES: (Reviewed with the patient and in the medical record)    NKDA    SOCIAL HISTORY: (Reviewed with the patient and in the medical record)  --Tobacco: none  --EtOH: Yes  --Occupation: Special education para; works lifting and carrying in meat raffle  --Avocation/Sport: Previously rugby and softball    FAMILY HISTORY: (Reviewed with the patient and in the medical record)  -- No family history of bleeding, clotting, or difficulty with anesthesia    Family History   Problem Relation Age of Onset     Hypertension Mother      Arthritis Mother      Lipids Father      High Cholesterol     Breast Cancer Maternal Grandmother      CANCER Maternal Grandmother      DIABETES Maternal Grandfather      Hypertension Maternal Grandfather      CEREBROVASCULAR DISEASE Other      Thyroid Disease No family hx of      Glaucoma No family hx of      Macular Degeneration No family hx of        REVIEW OF SYSTEMS: (Reviewed with the patient and on the health intake form)  -- A comprehensive 10 point review of systems was conducted and is negative except as noted in the HPI    EXAM:     General: Pleasant, well-developed, female   Psych: Articulates and communicates with a normal and congruent affect  Pulm: Non-labored breathing at rest  Cardiac: Palpable radial pulse with a regular rhythm  HEENT: Extra-ocular movements are intact   Neuro: Full cervical range of motion with negative spurling's bilaterally      Right upper extremity:    Mild tenderness to palpation over the posterior shoulder, subacromial space, and anteriorly over the biceps  tendon.    Point tender to palpation over the AC joint which reproduces her usual symptoms    Range of Motion:     Forward Flexion: Forward flexion 180  actively    External Rotation: External rotation to 60  in adduction actively    Internal Rotation: Internal rotation to L1 with intact liftoff    Positive cross adduction maneuver    O'Briens maneuver reproduces pain in both pronation and supination    Empty can testing produces pain.  Strength grading limited by pain.  Mild discomfort with resisted external rotation in adduction    Mild anterior shoulder discomfort with Speed's maneuver and negative Yergason's maneuver    Sensation intact to light touch in the axillary, median, radial, ulnar, nerve distributions    intact , first dorsal interosseous      IMAGING:  Right shoulder radiographs obtained in clinic today demonstrate A concentrically reduced glenohumeral joint with preserved joint space.  There is no acute osseous abnormality, fracture, or subluxation.  There is preservation of the AC joint space without luxation or evidence of separation.    Right shoulder MRI obtained February 17, 2017 with intra-articular contrast is reviewed and demonstrates no evidence of labral tearing, rotator cuff tearing, joint subluxation or evidence of instability.  There is evidence of mild anterior superior labral fraying as well as mild bursal sided rotator cuff fraying at the junction with the footprint.    ASSESSMENT:  1. Right AC joint sprain    PLAN:  We had an extensive discussion with Ms. Mccurdy in clinic today with regards to her right shoulder pain symptoms.  We reviewed her history, exam, and imaging findings which are suggestive of right AC joint as the etiology of her pain symptoms.  Her anterior shoulder pain based on history, her point tenderness on exam, and the positive provocative maneuvers make the AC joint the likely primary etiology of her pain symptoms.  The mild fraying of both the labrum and rotator  cuff seen on the MRI are likely incidental findings and not consistent with her exam and primary problem in clinic today.    We discussed the available treatment options and would strongly advocate for nonoperative management given her young age.  We discussed the role of an ultrasound-guided AC joint injection for both diagnostic and therapeutic purposes.  She and her mother would like to avoid injections and we discussed topical Voltaren gel as an alternative anti-inflammatory medication to control her pain.  We preliminarily reviewed the alternative operative intervention which would be a distal clavicle excision.  Given her age, activity level and healthy-appearing joint on both MRI and x-ray we would strongly discourage this procedure.      Prescription provided for Voltaren gel to treat AC joint inflammation    Recommend ultrasound-guided AC joint injection should topical anti-inflammatories fail to provide relief    May follow-up as needed pending the results of the above to further evaluate and discuss alternative treatment options.    Ezequiel Severino MD 05/10/18  Orthopaedic Surgery Resident, PGY-5      I have personally examined this patient and have reviewed the clinical presentation and progress note with the resident.  I agree with the treatment plan as outlined.  The plan was formulated with the resident on the day of the resident's note.       Again, thank you for allowing me to participate in the care of your patient.        Sincerely,        Lynn Turpin MD

## 2018-05-10 NOTE — MR AVS SNAPSHOT
After Visit Summary   5/10/2018    Meme Mccurdy    MRN: [id number]           Patient Information     Date Of Birth          1995        Visit Information        Provider Department      5/10/2018 2:45 PM Lynn Turpin MD [facility]        Today's Diagnoses     Injury of right acromioclavicular joint, initial encounter    -  1      Care Instructions    Thanks for coming today.  Ortho/Sports Medicine Clinic  [address] [place] [address]    To schedule future appointments in Ortho Clinic, you may call [phone].    To schedule ordered imaging by your provider:   Call Central Imaging [phone]    To schedule an injection ordered by your provider:  Call Central Imaging Injection scheduling line: [phone]  SecretSales available online at:  [email or web address]    Please call if any further questions or concerns ([phone]).  Clinic hours 8 am to 5 pm.    Return to clinic (call) if symptoms worsen or fail to improve.            Follow-ups after your visit        Who to contact     If you have questions or need follow up information about today's clinic visit or your schedule please contact [facility] directly at [phone].  Normal or non-critical lab and imaging results will be communicated to you by Padcomhart, letter or phone within 4 business days after the clinic has received the results. If you do not hear from us within 7 days, please contact the clinic through Padcomhart or phone. If you have a critical or abnormal lab result, we will notify you by phone as soon as possible.  Submit refill requests through SecretSales or call your pharmacy and they will forward the refill request to us. Please allow 3 business days for your refill to be completed.          Additional Information About Your Visit        MyChart Information     SecretSales is an electronic gateway that provides easy, online access to your medical records.  "With Implisithart, you can request a clinic appointment, read your test results, renew a prescription or communicate with your care team.     To sign up for [employer] visit the website at [email or web address]   You will be asked to enter the access code listed below, as well as some personal information. Please follow the directions to create your username and password.     Your access code is: [email or web address]  Expires: 6/10/2018 10:06 AM     Your access code will  in 90 days. If you need help or a new code, please contact your [facility] or call [phone] for assistance.        Care EveryWhere ID     This is your Care EveryWhere ID. This could be used by other organizations to access your [facility] medical records  [id number]        Your Vitals Were     Pulse Height Pulse Oximetry BMI (Body Mass Index)          92 1.727 m (5' 8\") 98% 40.45 kg/m2         Blood Pressure from Last 3 Encounters:   18 126/81   18 118/64   18 121/84    Weight from Last 3 Encounters:   05/10/18 120.7 kg (266 lb)   18 121.2 kg (267 lb 3.2 oz)   18 117 kg (258 lb)              Today, you had the following     No orders found for display         Today's Medication Changes          These changes are accurate as of 5/10/18 11:59 PM.  If you have any questions, ask your nurse or doctor.               Start taking these medicines.        Dose/Directions    diclofenac 1 % Gel topical gel   Commonly known as:  VOLTAREN   Used for:  Injury of right acromioclavicular joint, initial encounter   Started by:  Lynn Turpin MD        Apply 2 grams to shoulder up to four times daily using enclosed dosing card.   Quantity:  100 g   Refills:  1            Where to get your medicines      These medications were sent to [id number] - [place] - [id number]  [address] [address]    Hours:  Same info as [facility] Phone:  [phone]     " diclofenac 1 % Gel topical gel                Primary Care Provider Office Phone # Fax #    Lupe MEIER Kehr, PA-C [phone] [address] [facility] [address]        Equal Access to Services     ALEKSANDRA SMITH : Hadkomal tatiana ku hadstalino Soomaali, waaxda luqadaha, qaybta kaalmada adeegyada, juli parryn mary kate buchanan laHoachristina roberson. So [facility] [phone].    ATENCIÓN: Si habla español, tiene a prabhakar disposición servicios gratuitos de asistencia lingüística. Llame al [phone].    We comply with applicable federal civil rights laws and [place] laws. We do not discriminate on the basis of race, color, national origin, age, disability, sex, sexual orientation, or gender identity.            Thank you!     Thank you for choosing [facility]  for your care. Our goal is always to provide you with excellent care. Hearing back from our patients is one way we can continue to improve our services. Please take a few minutes to complete the written survey that you may receive in the mail after your visit with us. Thank you!             Your Updated Medication List - Protect others around you: Learn how to safely use, store and throw away your medicines at www.disposemymeds.org.          This list is accurate as of 5/10/18 11:59 PM.  Always use your most recent med list.                   Brand Name Dispense Instructions for use Diagnosis    diclofenac 1 % Gel topical gel    VOLTAREN    100 g    Apply 2 grams to shoulder up to four times daily using enclosed dosing card.    Injury of right acromioclavicular joint, initial encounter       famotidine 40 MG tablet    PEPCID    30 tablet    Take 1 tablet (40 mg) by mouth At Bedtime    Gastroesophageal reflux disease without esophagitis       ibuprofen 800 MG tablet    ADVIL/MOTRIN    60 tablet    Take 1 tablet by mouth every 8 hours as needed for pain.    Follow-up exam after treatment       medroxyPROGESTERone 150 MG/ML injection    DEPO-PROVERA     1 mL    Inject 1 mL (150 mg) into the muscle every 3 months    Encounter for initial prescription of injectable contraceptive       sertraline 100 MG tablet    ZOLOFT    180 tablet    Take 2 tablets (200 mg) by mouth daily    Depression with anxiety

## 2018-05-10 NOTE — PATIENT INSTRUCTIONS
Thanks for coming today.  Ortho/Sports Medicine Clinic  [address] [place] [address]    To schedule future appointments in Ortho Clinic, you may call [phone].    To schedule ordered imaging by your provider:   Call Central Imaging [phone]    To schedule an injection ordered by your provider:  Call Central Imaging Injection scheduling line: [phone]  CrystalCommercehart available online at:  [email or web address]    Please call if any further questions or concerns ([phone]).  Clinic hours 8 am to 5 pm.    Return to clinic (call) if symptoms worsen or fail to improve.

## 2018-05-10 NOTE — NURSING NOTE
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  Did play rugbee for the college     Initial Pulse 92  Ht 1.727 m (5' 8\")  Wt 120.7 kg (266 lb)  SpO2 98%  BMI 40.45 kg/m2 Estimated body mass index is 40.45 kg/(m^2) as calculated from the following:    Height as of this encounter: 1.727 m (5' 8\").    Weight as of this encounter: 120.7 kg (266 lb).  Medication Reconciliation: complete   Daisy Goodson CMA      "

## 2018-05-10 NOTE — PROGRESS NOTES
REFERRING PROVIDER: Fannie Edmond MD    CHIEF CONCERN: Right shoulder pain    HISTORY:   Meme Mccurdy is a 23 year old right hand dominant female who presents for further evaluation of right shoulder pain symptoms.  She describes playing softball with 10 years of pitching followed by several years of playing rugby leading to gradually progressive right shoulder pain symptoms.  More recently her symptoms have progressed the point that she has avoided sports and has stopped playing rugby.  She has specific difficulty with lifting and even light activities such as running due to pain in the shoulder.  She has specific difficulty sleeping on the right side and has some symptoms even at rest.  She has tried ibuprofen and a course of physical therapy with minimal benefit.  She presents today for further evaluation and discussion of the available treatment options.    In clinic today she notes that her anterior shoulder is most uncomfortable and exacerbated by holding the arm outstretched or lifting overhead.  Her symptoms are improved by activity modification and ibuprofen.    PAST MEDICAL HISTORY: (Reviewed with the patient and in the medical record)  1. Depression    Past Medical History:   Diagnosis Date     Allergic rhinitis      Allergies     dog     BMI (body mass index), pediatric, 85% to less than 95% for age 1/3/2013     Chronic GERD      Eczema      Melanocytic nevus of face 12/2009       PAST SURGICAL HISTORY: (Reviewed with the patient and in the medical record)  1. Tonsillectomy    Past Surgical History:   Procedure Laterality Date     HC TOOTH EXTRACTION W/FORCEP  9/08     TONSILLECTOMY Bilateral 12/19/2016    Procedure: TONSILLECTOMY;  Surgeon: Ilya Montilla MD;  Location: [facility] OR       MEDICATIONS: (Reviewed with the patient and in the medical record)      Current Outpatient Prescriptions   Medication     famotidine (PEPCID) 40 MG tablet     ibuprofen (ADVIL,MOTRIN) 800 MG tablet      medroxyPROGESTERone (DEPO-PROVERA) 150 MG/ML injection     sertraline (ZOLOFT) 100 MG tablet     No current facility-administered medications for this visit.        ALLERGIES: (Reviewed with the patient and in the medical record)    NKDA    SOCIAL HISTORY: (Reviewed with the patient and in the medical record)  --Tobacco: none  --EtOH: Yes  --Occupation: Special education para; works lifting and carrying in meat raffle  --Avocation/Sport: Previously rugby and softball    FAMILY HISTORY: (Reviewed with the patient and in the medical record)  -- No family history of bleeding, clotting, or difficulty with anesthesia    Family History   Problem Relation Age of Onset     Hypertension Mother      Arthritis Mother      Lipids Father      High Cholesterol     Breast Cancer Maternal Grandmother      CANCER Maternal Grandmother      DIABETES Maternal Grandfather      Hypertension Maternal Grandfather      CEREBROVASCULAR DISEASE Other      Thyroid Disease No family hx of      Glaucoma No family hx of      Macular Degeneration No family hx of        REVIEW OF SYSTEMS: (Reviewed with the patient and on the health intake form)  -- A comprehensive 10 point review of systems was conducted and is negative except as noted in the HPI    EXAM:     General: Pleasant, well-developed, female   Psych: Articulates and communicates with a normal and congruent affect  Pulm: Non-labored breathing at rest  Cardiac: Palpable radial pulse with a regular rhythm  HEENT: Extra-ocular movements are intact   Neuro: Full cervical range of motion with negative spurling's bilaterally      Right upper extremity:    Mild tenderness to palpation over the posterior shoulder, subacromial space, and anteriorly over the biceps tendon.    Point tender to palpation over the AC joint which reproduces her usual symptoms    Range of Motion:     Forward Flexion: Forward flexion 180  actively    External Rotation: External rotation to 60  in adduction  actively    Internal Rotation: Internal rotation to L1 with intact liftoff    Positive cross adduction maneuver    O'Briens maneuver reproduces pain in both pronation and supination    Empty can testing produces pain.  Strength grading limited by pain.  Mild discomfort with resisted external rotation in adduction    Mild anterior shoulder discomfort with Speed's maneuver and negative Yergason's maneuver    Sensation intact to light touch in the axillary, median, radial, ulnar, nerve distributions    intact , first dorsal interosseous      IMAGING:  Right shoulder radiographs obtained in clinic today demonstrate A concentrically reduced glenohumeral joint with preserved joint space.  There is no acute osseous abnormality, fracture, or subluxation.  There is preservation of the AC joint space without luxation or evidence of separation.    Right shoulder MRI obtained February 17, 2017 with intra-articular contrast is reviewed and demonstrates no evidence of labral tearing, rotator cuff tearing, joint subluxation or evidence of instability.  There is evidence of mild anterior superior labral fraying as well as mild bursal sided rotator cuff fraying at the junction with the footprint.    ASSESSMENT:  1. Right AC joint sprain    PLAN:  We had an extensive discussion with Ms. Mccurdy in clinic today with regards to her right shoulder pain symptoms.  We reviewed her history, exam, and imaging findings which are suggestive of right AC joint as the etiology of her pain symptoms.  Her anterior shoulder pain based on history, her point tenderness on exam, and the positive provocative maneuvers make the AC joint the likely primary etiology of her pain symptoms.  The mild fraying of both the labrum and rotator cuff seen on the MRI are likely incidental findings and not consistent with her exam and primary problem in clinic today.    We discussed the available treatment options and would strongly advocate for nonoperative  management given her young age.  We discussed the role of an ultrasound-guided AC joint injection for both diagnostic and therapeutic purposes.  She and her mother would like to avoid injections and we discussed topical Voltaren gel as an alternative anti-inflammatory medication to control her pain.  We preliminarily reviewed the alternative operative intervention which would be a distal clavicle excision.  Given her age, activity level and healthy-appearing joint on both MRI and x-ray we would strongly discourage this procedure.      Prescription provided for Voltaren gel to treat AC joint inflammation    Recommend ultrasound-guided AC joint injection should topical anti-inflammatories fail to provide relief    May follow-up as needed pending the results of the above to further evaluate and discuss alternative treatment options.    Ezequiel Severino MD 05/10/18  Orthopaedic Surgery Resident, PGY-5      I have personally examined this patient and have reviewed the clinical presentation and progress note with the resident.  I agree with the treatment plan as outlined.  The plan was formulated with the resident on the day of the resident's note.

## 2018-05-11 ENCOUNTER — TELEPHONE (OUTPATIENT)
Dept: ORTHOPEDICS | Facility: CLINIC | Age: 23
End: 2018-05-11

## 2018-05-14 NOTE — TELEPHONE ENCOUNTER
Reason for call:  Order   Order or referral being requested: for injection, need order for US guided.  From Dr Turpin, Ortho/Surg  Reason for request: possible US guided injection  Date needed: as soon as possible  Has the patient been seen by the PCP for this problem? YES    Additional comments: By Dr Turpin, may see sports medicine for injection, but US guided....     Phone number to reach patient:  Cell number on file:    Telephone Information:   Mobile [phone] mother Saskia  Until 3pm   Best Time:  any    Can we leave a detailed message on this number?  YES

## 2018-05-14 NOTE — TELEPHONE ENCOUNTER
Talked to Meme and let her know she does not need an order to schedule an injection, she just needs to make an appointment with either Dr. Ewing or Dr. Perez.  She agreed and had no further questions.  Daisy Goodson, CMA

## 2018-05-23 ENCOUNTER — OFFICE VISIT (OUTPATIENT)
Dept: ORTHOPEDICS | Facility: CLINIC | Age: 23
End: 2018-05-23
Payer: COMMERCIAL

## 2018-05-23 VITALS — SYSTOLIC BLOOD PRESSURE: 126 MMHG | DIASTOLIC BLOOD PRESSURE: 81 MMHG | HEART RATE: 85 BPM | OXYGEN SATURATION: 99 %

## 2018-05-23 DIAGNOSIS — M25.511 ARTHRALGIA OF RIGHT ACROMIOCLAVICULAR JOINT: Primary | ICD-10-CM

## 2018-05-23 PROCEDURE — 99207 ZZC NO CHARGE LOS: CPT | Performed by: FAMILY MEDICINE

## 2018-05-23 PROCEDURE — 20606 DRAIN/INJ JOINT/BURSA W/US: CPT | Mod: RT | Performed by: FAMILY MEDICINE

## 2018-05-23 ASSESSMENT — PAIN SCALES - GENERAL: PAINLEVEL: SEVERE PAIN (6)

## 2018-05-23 NOTE — NURSING NOTE
Meme Mccurdy's chief complaint for this visit includes:  Chief Complaint   Patient presents with     Consult     right shoulder injection      PCP: Kehr, Kristen M    Referring Provider:  No referring provider defined for this encounter.    /81  Pulse 85  SpO2 99% Severe Pain (6)     Do you need any medication refills at today's visit? No

## 2018-05-23 NOTE — MR AVS SNAPSHOT
After Visit Summary   5/23/2018    Meme Mccurdy    MRN: [id number]           Patient Information     Date Of Birth          1995        Visit Information        Provider Department      5/23/2018 3:20 PM Kwabena Perez,  [facility]        Today's Diagnoses     Arthralgia of right acromioclavicular joint    -  1       Follow-ups after your visit        Your next 10 appointments already scheduled     May 29, 2018  2:30 PM CDT   [facility]    [address] [address] [phone]              Future tests that were ordered for you today     Open Future Orders        Priority Expected Expires Ordered    US Guided Needle Placement Routine  5/24/2019 5/23/2018            Who to contact     If you have questions or need follow up information about today's clinic visit or your schedule please contact [facility] directly at [phone].  Normal or non-critical lab and imaging results will be communicated to you by MyChart, letter or phone within 4 business days after the clinic has received the results. If you do not hear from us within 7 days, please contact the clinic through Touristlinkhart or phone. If you have a critical or abnormal lab result, we will notify you by phone as soon as possible.  Submit refill requests through CertificationPoint or call your pharmacy and they will forward the refill request to us. Please allow 3 business days for your refill to be completed.          Additional Information About Your Visit        MyChart Information     CertificationPoint is an electronic gateway that provides easy, online access to your medical records. With CertificationPoint, you can request a clinic appointment, read your test results, renew a prescription or communicate with your care team.     To sign up for CertificationPoint visit the website at [email or web address]   You will be asked to enter the access  code listed below, as well as some personal information. Please follow the directions to create your username and password.     Your access code is: [email or web address]  Expires: 6/10/2018 10:06 AM     Your access code will  in 90 days. If you need help or a new code, please contact your [facility] or call [phone] for assistance.        Care EveryWhere ID     This is your Care EveryWhere ID. This could be used by other organizations to access your [facility] medical records  [id number]        Your Vitals Were     Pulse Pulse Oximetry                85 99%           Blood Pressure from Last 3 Encounters:   18 126/81   18 118/64   18 121/84    Weight from Last 3 Encounters:   05/10/18 120.7 kg (266 lb)   18 121.2 kg (267 lb 3.2 oz)   18 117 kg (258 lb)              We Performed the Following     HC ARTHROCNT ASPIR&/INJ SMALL JT/BURSAW/US REC RPRT     KENALOG PER 10 MG        Primary Care Provider Office Phone # Fax #    Kristen M Kehr, PA-C [phone] [address] [facility] [address]        Equal Access to Services     ALEKSANDRA SMITH : Hadii aad ku hadasho Soomaali, waaxda luqadaha, qaybta kaalmada adeegyada, waxay idiin hayeven roselineeg chanel roberson. So [facility] [phone].    ATENCIÓN: Si habla español, tiene a prabhakar disposición servicios gratuitos de asistencia lingüística. Llame al [phone].    We comply with applicable federal civil rights laws and [place] laws. We do not discriminate on the basis of race, color, national origin, age, disability, sex, sexual orientation, or gender identity.            Thank you!     Thank you for choosing [facility]  for your care. Our goal is always to provide you with excellent care. Hearing back from our patients is one way we can continue to improve our services. Please take a few minutes to complete the written survey that you may receive in the mail after your visit  with us. Thank you!             Your Updated Medication List - Protect others around you: Learn how to safely use, store and throw away your medicines at www.disposemymeds.org.          This list is accurate as of 5/23/18  4:07 PM.  Always use your most recent med list.                   Brand Name Dispense Instructions for use Diagnosis    diclofenac 1 % Gel topical gel    VOLTAREN    100 g    Apply 2 grams to shoulder up to four times daily using enclosed dosing card.    Injury of right acromioclavicular joint, initial encounter       famotidine 40 MG tablet    PEPCID    30 tablet    Take 1 tablet (40 mg) by mouth At Bedtime    Gastroesophageal reflux disease without esophagitis       ibuprofen 800 MG tablet    ADVIL/MOTRIN    60 tablet    Take 1 tablet by mouth every 8 hours as needed for pain.    Follow-up exam after treatment       medroxyPROGESTERone 150 MG/ML injection    DEPO-PROVERA    1 mL    Inject 1 mL (150 mg) into the muscle every 3 months    Encounter for initial prescription of injectable contraceptive       sertraline 100 MG tablet    ZOLOFT    180 tablet    Take 2 tablets (200 mg) by mouth daily    Depression with anxiety

## 2018-05-23 NOTE — PROGRESS NOTES
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  She is here for a right acromioclavicular injection.  She is seen at the request of Dr. Lynn Turpin.    Vitals:    05/23/18 1524   BP: 126/81   Pulse: 85   SpO2: 99%       PROCEDURE    Acromioclavicular Injection - Ultrasound Guided  The patient was informed of the risks and the benefits of the procedure and a written consent was signed.  The patient s right acromioclavicular joint was prepped with chlorhexidine in sterile fashion.   20 mg of triamcinolone suspension was drawn up into a 3 mL syringe with 1 mL of 1% lidocaine.  Injection was performed using sterile technique.  Under ultrasound guidance a 5/8-inch 25-gauge needle was used to enter the right acromioclavicular joint.  Needle placement was visualized and documented with ultrasound.  Needle placed in short axis to the probe.  Ultrasound visualization was necessary due to decreased joint space in the setting of osteoarthritis.  Images were permanently stored for the patient's record.  There were no complications. The patient tolerated the procedure well. There was negligible bleeding.   The patient was instructed to ice the shoulder upon leaving clinic and refrain from overuse over the next 3 days.   The patient was instructed to call or go to the emergency room with any unusual pain, swelling, redness, or if otherwise concerned.  Kenalog: NDC 9827-1676-96    Meme is going to let us know how she is doing in 2 weeks.  If the acromioclavicular injection did not help her we could consider a glenohumeral injection on a diagnostic and therapeutic basis.      DO JULIENNE Galvin

## 2018-05-29 ENCOUNTER — ALLIED HEALTH/NURSE VISIT (OUTPATIENT)
Dept: NURSING | Facility: CLINIC | Age: 23
End: 2018-05-29
Payer: COMMERCIAL

## 2018-05-29 VITALS
DIASTOLIC BLOOD PRESSURE: 84 MMHG | WEIGHT: 268 LBS | BODY MASS INDEX: 40.75 KG/M2 | SYSTOLIC BLOOD PRESSURE: 137 MMHG | HEART RATE: 90 BPM

## 2018-05-29 DIAGNOSIS — Z30.42 DEPO-PROVERA CONTRACEPTIVE STATUS: Primary | ICD-10-CM

## 2018-05-29 PROCEDURE — 99207 ZZC NO CHARGE NURSE ONLY: CPT

## 2018-05-29 PROCEDURE — 96372 THER/PROPH/DIAG INJ SC/IM: CPT

## 2018-05-29 NOTE — PROGRESS NOTES
BP: 137/84    LAST PAP/EXAM: Lab Results       Component                Value               Date                       PAP                      NIL                 03/19/2018            URINE HCG:not indicated    The following medication was given:     MEDICATION: Depo Provera 150mg  ROUTE: IM  SITE: Vastus Lateralis - Right  : MediSwipe  LOT #: [id number]  EXP:4/2020  NEXT INJECTION DUE: 8/14/18 - 8/28/18   Provider: KRISTEN KEHR Elizabeth French, MA'

## 2018-05-29 NOTE — MR AVS SNAPSHOT
"              After Visit Summary   2018    Meme Mccurdy    MRN: [id number]           Patient Information     Date Of Birth          1995        Visit Information        Provider Department      2018 2:30 PM [facility]        Today's Diagnoses     Depo-Provera contraceptive status    -  1       Follow-ups after your visit        Who to contact     If you have questions or need follow up information about today's clinic visit or your schedule please contact [facility] directly at [phone].  Normal or non-critical lab and imaging results will be communicated to you by MyChart, letter or phone within 4 business days after the clinic has received the results. If you do not hear from us within 7 days, please contact the clinic through Monitorhart or phone. If you have a critical or abnormal lab result, we will notify you by phone as soon as possible.  Submit refill requests through Joinnus or call your pharmacy and they will forward the refill request to us. Please allow 3 business days for your refill to be completed.          Additional Information About Your Visit        MyChart Information     Joinnus lets you send messages to your doctor, view your test results, renew your prescriptions, schedule appointments and more. To sign up, go to [email or web address] . Click on \"Log in\" on the left side of the screen, which will take you to the Welcome page. Then click on \"Sign up Now\" on the right side of the page.     You will be asked to enter the access code listed below, as well as some personal information. Please follow the directions to create your username and password.     Your access code is: [email or web address]  Expires: 6/10/2018 10:06 AM     Your access code will  in 90 days. If you need help or a new code, please call your [facility] or [phone].        Care EveryWhere ID     This is your Care EveryWhere ID. This could be used by other " organizations to access your [place] medical records  [id number]        Your Vitals Were     Pulse BMI (Body Mass Index)                90 40.75 kg/m2           Blood Pressure from Last 3 Encounters:   05/29/18 137/84   05/23/18 126/81   04/30/18 118/64    Weight from Last 3 Encounters:   05/29/18 268 lb (121.6 kg)   05/10/18 266 lb (120.7 kg)   04/30/18 267 lb 3.2 oz (121.2 kg)              We Performed the Following     C Medroxyprogesterone inj/1mg     INJECTION INTRAMUSCULAR OR SUB-Q        Primary Care Provider Office Phone # Fax #    Kristen M Kehr, PA-C [phone] [address] [facility] [address]        Equal Access to Services     ALEKSANDRA SMITH : Hadii aad ku hadasho Soomaali, waaxda luqadaha, qaybta kaalmada adeegyada, juli vega . So [facility] [phone].    ATENCIÓN: Si habla español, tiene a prabhakar disposición servicios gratuitos de asistencia lingüística. Llame al [phone].    We comply with applicable federal civil rights laws and [place] laws. We do not discriminate on the basis of race, color, national origin, age, disability, sex, sexual orientation, or gender identity.            Thank you!     Thank you for choosing [facility]  for your care. Our goal is always to provide you with excellent care. Hearing back from our patients is one way we can continue to improve our services. Please take a few minutes to complete the written survey that you may receive in the mail after your visit with us. Thank you!             Your Updated Medication List - Protect others around you: Learn how to safely use, store and throw away your medicines at www.disposemymeds.org.          This list is accurate as of 5/29/18  2:36 PM.  Always use your most recent med list.                   Brand Name Dispense Instructions for use Diagnosis    diclofenac 1 % Gel topical gel    VOLTAREN    100 g    Apply 2 grams to shoulder up to four times daily using  enclosed dosing card.    Injury of right acromioclavicular joint, initial encounter       famotidine 40 MG tablet    PEPCID    30 tablet    Take 1 tablet (40 mg) by mouth At Bedtime    Gastroesophageal reflux disease without esophagitis       ibuprofen 800 MG tablet    ADVIL/MOTRIN    60 tablet    Take 1 tablet by mouth every 8 hours as needed for pain.    Follow-up exam after treatment       medroxyPROGESTERone 150 MG/ML injection    DEPO-PROVERA    1 mL    Inject 1 mL (150 mg) into the muscle every 3 months    Encounter for initial prescription of injectable contraceptive       sertraline 100 MG tablet    ZOLOFT    180 tablet    Take 2 tablets (200 mg) by mouth daily    Depression with anxiety

## 2018-05-31 NOTE — TELEPHONE ENCOUNTER
[facility] in [place] called to check the status of the PA for Diclofenac 1% Gel.  Requesting a call with an update.  [phone]. Thank you.

## 2018-05-31 NOTE — TELEPHONE ENCOUNTER
It looks like a PA was originally started in this encounter, but I'm not sure that it was ever done as people also started to document other things. PA encounter sent again to PA team to review.  Need to reach out to the pharmacy to give them an update and get any more information if needed.    Luis Angel Fowler, RN

## 2018-05-31 NOTE — TELEPHONE ENCOUNTER
Central Prior Authorization Team   Phone: [phone]      PA Initiation    Medication: Diclofenac 1% Gel  Insurance Company: [employer] [facility] - Phone [phone] Fax [phone]  Pharmacy Filling the Rx: [id number] - [place] - [id number]  Filling Pharmacy Phone: [phone]  Filling Pharmacy Fax:    Start Date: 5/31/2018

## 2018-06-07 NOTE — TELEPHONE ENCOUNTER
PRIOR AUTHORIZATION DENIED    Medication: Diclofenac 1% Gel- PA DENIED    Denial Date: 6/1/2018    Denial Rational:          Appeal Information:

## 2018-08-15 ENCOUNTER — ALLIED HEALTH/NURSE VISIT (OUTPATIENT)
Dept: NURSING | Facility: CLINIC | Age: 23
End: 2018-08-15
Payer: COMMERCIAL

## 2018-08-15 VITALS
DIASTOLIC BLOOD PRESSURE: 87 MMHG | HEART RATE: 92 BPM | BODY MASS INDEX: 40.9 KG/M2 | WEIGHT: 269 LBS | SYSTOLIC BLOOD PRESSURE: 140 MMHG

## 2018-08-15 DIAGNOSIS — Z30.42 DEPO-PROVERA CONTRACEPTIVE STATUS: Primary | ICD-10-CM

## 2018-08-15 PROCEDURE — 99207 ZZC NO CHARGE NURSE ONLY: CPT

## 2018-08-15 PROCEDURE — 96372 THER/PROPH/DIAG INJ SC/IM: CPT

## 2018-08-15 NOTE — MR AVS SNAPSHOT
After Visit Summary   8/15/2018    Meme Mccurdy    MRN: [id number]           Patient Information     Date Of Birth          1995        Visit Information        Provider Department      8/15/2018 9:00 AM AN ANCILLARY [facility]        Today's Diagnoses     Depo-Provera contraceptive status    -  1       Follow-ups after your visit        Who to contact     If you have questions or need follow up information about today's clinic visit or your schedule please contact [facility] directly at [phone].  Normal or non-critical lab and imaging results will be communicated to you by MyChart, letter or phone within 4 business days after the clinic has received the results. If you do not hear from us within 7 days, please contact the clinic through MyChart or phone. If you have a critical or abnormal lab result, we will notify you by phone as soon as possible.  Submit refill requests through fotobabble or call your pharmacy and they will forward the refill request to us. Please allow 3 business days for your refill to be completed.          Additional Information About Your Visit        Care EveryWhere ID     This is your Care EveryWhere ID. This could be used by other organizations to access your [facility] medical records  [id number]        Your Vitals Were     Pulse BMI (Body Mass Index)                92 40.9 kg/m2           Blood Pressure from Last 3 Encounters:   08/15/18 140/87   05/29/18 137/84   05/23/18 126/81    Weight from Last 3 Encounters:   08/15/18 269 lb (122 kg)   05/29/18 268 lb (121.6 kg)   05/10/18 266 lb (120.7 kg)              We Performed the Following     C Medroxyprogesterone inj/1mg     INJECTION INTRAMUSCULAR OR SUB-Q        Primary Care Provider Office Phone # Fax #    Kristen M Kehr, PA-C [phone] [address] [facility] [address]        Equal Access to Services     ALEKSANDRA SMITH AH: neo Cabrera  lida reedkathie roselinejuli singh. So [facility] [phone].    ATENCIÓN: Si alex quezada, tiene a prabhakar disposición servicios gratuitos de asistencia lingüística. Marta al [phone].    We comply with applicable federal civil rights laws and [place] laws. We do not discriminate on the basis of race, color, national origin, age, disability, sex, sexual orientation, or gender identity.            Thank you!     Thank you for choosing [facility]  [facility]. Our goal is always to provide you with excellent care. Hearing back from our patients is one way we can continue to improve our services. Please take a few minutes to complete the written survey that you may receive in the mail after your visit with us. Thank you!             Your Updated Medication List - Protect others around you: Learn how to safely use, store and throw away your medicines at www.disposemymeds.org.          This list is accurate as of 8/15/18  9:19 AM.  Always use your most recent med list.                   Brand Name Dispense Instructions for use Diagnosis    diclofenac 1 % Gel topical gel    VOLTAREN    100 g    Apply 2 grams to shoulder up to four times daily using enclosed dosing card.    Injury of right acromioclavicular joint, initial encounter       famotidine 40 MG tablet    PEPCID    30 tablet    Take 1 tablet (40 mg) by mouth At Bedtime    Gastroesophageal reflux disease without esophagitis       ibuprofen 800 MG tablet    ADVIL/MOTRIN    60 tablet    Take 1 tablet by mouth every 8 hours as needed for pain.    Follow-up exam after treatment       medroxyPROGESTERone 150 MG/ML injection    DEPO-PROVERA    1 mL    Inject 1 mL (150 mg) into the muscle every 3 months    Encounter for initial prescription of injectable contraceptive       sertraline 100 MG tablet    ZOLOFT    180 tablet    Take 2 tablets (200 mg) by mouth daily    Depression with anxiety

## 2018-08-15 NOTE — PROGRESS NOTES
BP: 140/87    LAST PAP/EXAM: Lab Results       Component                Value               Date                       PAP                      NIL                 03/19/2018            URINE HCG:not indicated    The following medication was given:     MEDICATION: Depo Provera 150mg  ROUTE: IM  SITE: Ventrogluteal - Left  : Glarity  LOT #: [id number]  EXP:6/2020  NEXT INJECTION DUE: 10/31/18 - 11/14/18   Provider: kristen kehr Elizabeth French, MA

## 2018-09-12 ENCOUNTER — RADIANT APPOINTMENT (OUTPATIENT)
Dept: GENERAL RADIOLOGY | Facility: CLINIC | Age: 23
End: 2018-09-12
Attending: PHYSICIAN ASSISTANT
Payer: COMMERCIAL

## 2018-09-12 ENCOUNTER — OFFICE VISIT (OUTPATIENT)
Dept: FAMILY MEDICINE | Facility: CLINIC | Age: 23
End: 2018-09-12
Payer: COMMERCIAL

## 2018-09-12 VITALS
SYSTOLIC BLOOD PRESSURE: 122 MMHG | HEART RATE: 101 BPM | DIASTOLIC BLOOD PRESSURE: 84 MMHG | WEIGHT: 272 LBS | OXYGEN SATURATION: 100 % | BODY MASS INDEX: 41.36 KG/M2 | TEMPERATURE: 98.8 F | RESPIRATION RATE: 16 BRPM

## 2018-09-12 DIAGNOSIS — M79.671 RIGHT FOOT PAIN: Primary | ICD-10-CM

## 2018-09-12 DIAGNOSIS — Z23 NEED FOR PROPHYLACTIC VACCINATION AND INOCULATION AGAINST INFLUENZA: ICD-10-CM

## 2018-09-12 DIAGNOSIS — E66.01 MORBID OBESITY (H): ICD-10-CM

## 2018-09-12 PROCEDURE — 90471 IMMUNIZATION ADMIN: CPT | Performed by: PHYSICIAN ASSISTANT

## 2018-09-12 PROCEDURE — 73630 X-RAY EXAM OF FOOT: CPT | Mod: RT

## 2018-09-12 PROCEDURE — 99213 OFFICE O/P EST LOW 20 MIN: CPT | Mod: 25 | Performed by: PHYSICIAN ASSISTANT

## 2018-09-12 PROCEDURE — 90686 IIV4 VACC NO PRSV 0.5 ML IM: CPT | Performed by: PHYSICIAN ASSISTANT

## 2018-09-12 ASSESSMENT — PAIN SCALES - GENERAL: PAINLEVEL: NO PAIN (0)

## 2018-09-12 NOTE — PROGRESS NOTES
SUBJECTIVE:   Meme Mccurdy is a 23 year old female who presents to clinic today for the following health issues:    Meme is here for right foot pain. 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 She does not recall a particular injury. She has had a small bump on the top of her foot. It started to hurt yesterday. She has not had swelling. She is taking ibuprofen for the pain.     Concern - right foot pain  Onset: pain started yesterday    Description:   Sharp shooting pain    Intensity: severe    Progression of Symptoms:  worsening    Accompanying Signs & Symptoms:      Previous history of similar problem:       Precipitating factors:   Worsened by: walking    Alleviating factors:  Improved by: none    Therapies Tried and outcome: ibuprofen       Problem list and histories reviewed & adjusted, as indicated.  Additional history: as documented    Patient Active Problem List   Diagnosis     Melanocytic nevus of face     Ovarian cyst     BMI (body mass index), pediatric, 85% to less than 95% for age     Depression with anxiety     Insomnia     Encounter for initial prescription of injectable contraceptive     Morbid obesity (H)     Past Surgical History:   Procedure Laterality Date     HC TOOTH EXTRACTION W/FORCEP  9/08     TONSILLECTOMY Bilateral 12/19/2016    Procedure: TONSILLECTOMY;  Surgeon: Ilya Montilla MD;  Location:  OR       Social History   Substance Use Topics     Smoking status: Never Smoker     Smokeless tobacco: Never Used     Alcohol use No     Family History   Problem Relation Age of Onset     Hypertension Mother      Arthritis Mother      Lipids Father      High Cholesterol     Breast Cancer Maternal Grandmother      Cancer Maternal Grandmother      Diabetes Maternal Grandfather      Hypertension Maternal Grandfather      Cerebrovascular Disease Other      Thyroid Disease No family hx of      Glaucoma No family hx of      Macular Degeneration No family hx of          Current Outpatient Prescriptions    Medication Sig Dispense Refill     diclofenac (VOLTAREN) 1 % GEL topical gel Apply 2 grams to shoulder up to four times daily using enclosed dosing card. 100 g 1     famotidine (PEPCID) 40 MG tablet Take 1 tablet (40 mg) by mouth At Bedtime 30 tablet 3     ibuprofen (ADVIL,MOTRIN) 800 MG tablet Take 1 tablet by mouth every 8 hours as needed for pain. 60 tablet 1     medroxyPROGESTERone (DEPO-PROVERA) 150 MG/ML injection Inject 1 mL (150 mg) into the muscle every 3 months 1 mL 3     sertraline (ZOLOFT) 100 MG tablet Take 2 tablets (200 mg) by mouth daily 180 tablet 1     Allergies   Allergen Reactions     Nkda [No Known Drug Allergies]        Reviewed and updated as needed this visit by clinical staff  Tobacco  Allergies  Meds  Med Hx  Surg Hx  Fam Hx  Soc Hx      Reviewed and updated as needed this visit by Provider         ROS:  Constitutional, HEENT, cardiovascular, pulmonary, GI, , musculoskeletal, neuro, skin, endocrine and psych systems are negative, except as otherwise noted.    OBJECTIVE:     BP (!) 139/91  Pulse 101  Temp 98.8  F (37.1  C) (Oral)  Resp 16  Wt 272 lb (123.4 kg)  SpO2 100%  BMI 41.36 kg/m2  Body mass index is 41.36 kg/(m^2).  GENERAL: healthy, alert and no distress  MS: right foot: normal inspection. There is a small hard palpable tender mass on the top of her foot. No swelling or redness. Foot is otherwise normal. Gait is stable. No limping.   SKIN: no suspicious lesions or rashes  PSYCH: mentation appears normal, affect normal/bright    Diagnostic Test Results:  Xray right foot, xray reviewed and no abnormalities seen, Radiology will also review    ASSESSMENT/PLAN:       1. Right foot pain  Reviewed Xray with Meme in the clinic. See above, I will send a copy of Radiology report also. No abnormalities seen. She will keep this covered to avoid rubbing on her shoes and use NSAIDS as needed. Referral for Podiatry given if this continues to bother her.   - XR Foot Right G/E 3  Views    2. Need for prophylactic vaccination and inoculation against influenza  - FLU VACCINE, SPLIT VIRUS, IM (QUADRIVALENT) [52235]- >3 YRS  - Vaccine Administration, Initial [01094]    3. Morbid obesity (H)  Continue working on lifestyle changes.         Kristen M. Kehr, PA-C  [facility]    Injectable Influenza Immunization Documentation    1.  Is the person to be vaccinated sick today?   No    2. Does the person to be vaccinated have an allergy to a component   of the vaccine?   No  Egg Allergy Algorithm Link    3. Has the person to be vaccinated ever had a serious reaction   to influenza vaccine in the past?   No    4. Has the person to be vaccinated ever had Guillain-Barré syndrome?   No    Form completed by Mike MARTINEZ MA

## 2018-09-12 NOTE — MR AVS SNAPSHOT
After Visit Summary   9/12/2018    Meme Mccurdy    MRN: [id number]           Patient Information     Date Of Birth          1995        Visit Information        Provider Department      9/12/2018 11:00 AM Kehr, Kristen M, PA-C [facility]        Today's Diagnoses     Right foot pain    -  1    Need for prophylactic vaccination and inoculation against influenza        Morbid obesity (H)           Follow-ups after your visit        Additional Services     PODIATRY/FOOT & ANKLE SURGERY REFERRAL       Your provider has referred you to: [facility]: [facility] [phone]   [email or web address]    Dr. Rolle    Please be aware that coverage of these services is subject to the terms and limitations of your health insurance plan.  Call member services at your health plan with any benefit or coverage questions.      Please bring the following to your appointment:  >>   Any x-rays, CTs or MRIs which have been performed.  Contact the facility where they were done to arrange for  prior to your scheduled appointment.    >>   List of current medications   >>   This referral request   >>   Any documents/labs given to you for this referral                  Who to contact     If you have questions or need follow up information about today's clinic visit or your schedule please contact [facility] directly at [phone].  Normal or non-critical lab and imaging results will be communicated to you by MyChart, letter or phone within 4 business days after the clinic has received the results. If you do not hear from us within 7 days, please contact the clinic through MyChart or phone. If you have a critical or abnormal lab result, we will notify you by phone as soon as possible.  Submit refill requests through Abazab or call your pharmacy and they will forward the refill request to us. Please allow 3 business days for your refill to be  completed.          Additional Information About Your Visit        Care EveryWhere ID     This is your Care EveryWhere ID. This could be used by other organizations to access your [facility] medical records  [id number]        Your Vitals Were     Pulse Temperature Respirations Pulse Oximetry BMI (Body Mass Index)       101 98.8  F (37.1  C) (Oral) 16 100% 41.36 kg/m2        Blood Pressure from Last 3 Encounters:   09/12/18 122/84   08/15/18 140/87   05/29/18 137/84    Weight from Last 3 Encounters:   09/12/18 272 lb (123.4 kg)   08/15/18 269 lb (122 kg)   05/29/18 268 lb (121.6 kg)              We Performed the Following     FLU VACCINE, SPLIT VIRUS, IM (QUADRIVALENT) [91251]- >3 YRS     PODIATRY/FOOT & ANKLE SURGERY REFERRAL     Vaccine Administration, Initial [69231]     XR Foot Right G/E 3 Views        Primary Care Provider Office Phone # Fax #    Kristen M Kehr, PA-C [phone] [address] [facility] [address]        Equal Access to Services     [facility]: Hadii aad ku hadasho Soomaali, waaxda luqadaha, qaybta kaalmada adefrancisco, juli vega . So [facility] [phone].    ATENCIÓN: Si habla español, tiene a prabhakar disposición servicios gratuitos de asistencia lingüística. Marta al [phone].    We comply with applicable federal civil rights laws and [place] laws. We do not discriminate on the basis of race, color, national origin, age, disability, sex, sexual orientation, or gender identity.            Thank you!     Thank you for choosing [facility]  for your care. Our goal is always to provide you with excellent care. Hearing back from our patients is one way we can continue to improve our services. Please take a few minutes to complete the written survey that you may receive in the mail after your visit with us. Thank you!             Your Updated Medication List - Protect others around you: Learn how to safely use, store and throw away  your medicines at www.disposemymeds.org.          This list is accurate as of 9/12/18 11:35 AM.  Always use your most recent med list.                   Brand Name Dispense Instructions for use Diagnosis    diclofenac 1 % Gel topical gel    VOLTAREN    100 g    Apply 2 grams to shoulder up to four times daily using enclosed dosing card.    Injury of right acromioclavicular joint, initial encounter       famotidine 40 MG tablet    PEPCID    30 tablet    Take 1 tablet (40 mg) by mouth At Bedtime    Gastroesophageal reflux disease without esophagitis       ibuprofen 800 MG tablet    ADVIL/MOTRIN    60 tablet    Take 1 tablet by mouth every 8 hours as needed for pain.    Follow-up exam after treatment       medroxyPROGESTERone 150 MG/ML injection    DEPO-PROVERA    1 mL    Inject 1 mL (150 mg) into the muscle every 3 months    Encounter for initial prescription of injectable contraceptive       sertraline 100 MG tablet    ZOLOFT    180 tablet    Take 2 tablets (200 mg) by mouth daily    Depression with anxiety

## 2018-09-12 NOTE — NURSING NOTE
"Chief Complaint   Patient presents with     Ankle/Foot right       Initial BP (!) 139/91  Pulse 101  Temp 98.8  F (37.1  C) (Oral)  Resp 16  Wt 272 lb (123.4 kg)  SpO2 100%  BMI 41.36 kg/m2 Estimated body mass index is 41.36 kg/(m^2) as calculated from the following:    Height as of 5/10/18: 5' 8\" (1.727 m).    Weight as of this encounter: 272 lb (123.4 kg).  Medication Reconciliation: complete    JYOTI Marquez MA    "

## 2018-09-12 NOTE — LETTER
[facility]  [address] [facility] [address] [phone]        September 13, 2018    Meme Mccurdy  [address] [facility] [address]            Dear Meme,    The results of your recent tests were normal.  Below is a copy of the results.  It was a pleasure to see you at your last appointment.    If you have any questions or concerns, please call myself or my nurse at [phone].    Sincerely,    Kristen Kehr, PA-C/imani    Results for orders placed or performed in visit on 09/12/18   XR Foot Right G/E 3 Views    Narrative    XR FOOT RT G/E 3 VW 9/12/2018 11:18 AM    COMPARISON: None.    HISTORY: RIGHT foot pain.      Impression    IMPRESSION: No fractures are seen in the RIGHT foot. Joints are  preserved and in normal alignment.    RAUL HARRIS MD

## 2018-09-17 ENCOUNTER — RADIANT APPOINTMENT (OUTPATIENT)
Dept: GENERAL RADIOLOGY | Facility: CLINIC | Age: 23
End: 2018-09-17
Attending: PEDIATRICS
Payer: COMMERCIAL

## 2018-09-17 ENCOUNTER — OFFICE VISIT (OUTPATIENT)
Dept: ORTHOPEDICS | Facility: CLINIC | Age: 23
End: 2018-09-17
Payer: COMMERCIAL

## 2018-09-17 VITALS
WEIGHT: 272 LBS | SYSTOLIC BLOOD PRESSURE: 138 MMHG | BODY MASS INDEX: 42.69 KG/M2 | HEIGHT: 67 IN | DIASTOLIC BLOOD PRESSURE: 87 MMHG

## 2018-09-17 DIAGNOSIS — S49.91XD INJURY OF GLENOID LABRUM, RIGHT, SUBSEQUENT ENCOUNTER: ICD-10-CM

## 2018-09-17 DIAGNOSIS — M25.511 ARTHRALGIA OF RIGHT ACROMIOCLAVICULAR JOINT: ICD-10-CM

## 2018-09-17 DIAGNOSIS — S49.91XA INJURY OF RIGHT SHOULDER, INITIAL ENCOUNTER: Primary | ICD-10-CM

## 2018-09-17 PROCEDURE — 73030 X-RAY EXAM OF SHOULDER: CPT | Mod: RT

## 2018-09-17 PROCEDURE — 99213 OFFICE O/P EST LOW 20 MIN: CPT | Performed by: PEDIATRICS

## 2018-09-17 NOTE — PATIENT INSTRUCTIONS
Plan:  - Today's Plan of Care:  Over the counter medication: Patient's preferred OTC medication as directed on packaging.  Continue use of sling for comfort but wean as tolerated  Range of motion: pendulum exercises  Continue with Rest, Ice, Compression, and Elevation.  Apply ice 10-15 minutes every 2-3 hours    -We also discussed other future treatment options:  Rehab: Physical Therapy    Follow Up: 2 weeks    If you have any further questions for your physician or physician s care team you can call [phone] and use option 3 to leave a voice message. Calls received during business hours will be returned same day.

## 2018-09-17 NOTE — LETTER
9/17/2018         RE: Meme Mccurdy  [address] [facility] [address]        Dear Colleague,    Thank you for referring your patient, Meme Mccurdy, to the [place] [facility] [place]. Please see a copy of my visit note below.    Sports Medicine Clinic Visit - Interim History September 17, 2018    PCP: Kehr, Kristen M    Meme Mccurdy is a 23 year old female who is seen in f/u up for Injury of right shoulder, initial encounter. Since last visit on 4/30/18, patient has right shoulder pain due to re-injury. She currently has her arm in a sling. She reports on 9/15/18 she was playing rugby when she was tackled and landed on the right shoulder re-injuring it. She reports pain is located in the superior shoulder and she has numbness and paresthesia to her right fingers and hand occasionally.  - Prior to this, she had seen a surgeon and has pursued non operative treatment for her previous injury. She has had a AC joint steroid injection performed by Dr. Kwabena Perez on 5/23/18. She reports the Steroid injection provided good relief in pain.    Symptoms are better with: Ibuprofen  Symptoms are worse with: any movement of her arm  Additional Features:   Positive: bruising, popping, grinding, instability, paresthesias, numbness and weakness   Negative: swelling    Social History: Student, Rugby and softball    Review of Systems  Skin: no bruising, no swelling  Musculoskeletal: as above  Neurologic: occasional numbness, paresthesias  Remainder of review of systems is negative including constitutional, CV, pulmonary, GI, except as noted in HPI or medical history.    Patient's current problem list, past medical and surgical history, and family history were reviewed.    Patient Active Problem List   Diagnosis     Melanocytic nevus of face     Ovarian cyst     BMI (body mass index), pediatric, 85% to less than 95% for age     Depression with anxiety     Insomnia     Encounter for initial  "prescription of injectable contraceptive     Morbid obesity (H)     Past Medical History:   Diagnosis Date     Allergic rhinitis      Allergies     dog     BMI (body mass index), pediatric, 85% to less than 95% for age 1/3/2013     Chronic GERD      Eczema      Melanocytic nevus of face 12/2009     Past Surgical History:   Procedure Laterality Date     HC TOOTH EXTRACTION W/FORCEP  9/08     TONSILLECTOMY Bilateral 12/19/2016    Procedure: TONSILLECTOMY;  Surgeon: Ilya Montilla MD;  Location: [facility] OR     Family History   Problem Relation Age of Onset     Hypertension Mother      Arthritis Mother      Lipids Father      High Cholesterol     Breast Cancer Maternal Grandmother      Cancer Maternal Grandmother      Diabetes Maternal Grandfather      Hypertension Maternal Grandfather      Cerebrovascular Disease Other      Thyroid Disease No family hx of      Glaucoma No family hx of      Macular Degeneration No family hx of        Objective  /87 (BP Location: Left arm, Patient Position: Chair, Cuff Size: Adult Regular)  Ht 5' 7\" (1.702 m)  Wt 272 lb (123.4 kg)  BMI 42.6 kg/m2    GENERAL APPEARANCE: healthy, alert and no distress   GAIT: NORMAL  SKIN: no suspicious lesions or rashes  HEENT: Sclera clear, anicteric  CV: good peripheral pulses  RESP: Breathing not labored  NEURO: Normal strength and tone, mentation intact and speech normal  PSYCH:  mentation appears normal and affect normal/bright    Bilateral Shoulder exam  Inspection and Posture:       normal    Skin:        no visible deformities    Tender:        acromioclavicular joint right       posterior shoulder right       proximal humerus right       upper trapezius right    Non Tender:       remainder of shoulder right    ROM:        forward flexion 120  right       abduction 120 right       internal rotation gluteal region right       external rotation 45 right    Painful motions:       flexion right       elevation right    Strength:        " abduction 4/5 right       flexion 4/5 right       internal rotation 4/5 right       external rotation 4/5 right    Impingement testing:       positive (+) Neer right       positive (+) crossover right    Sensation:        normal sensation over shoulder and upper extremity     Radiology  I ordered, visualized and reviewed these images with the patient  4 XR views of right shoulder reviewed: no acute bony abnormality, no significant degenerative change  - will follow official read    Assessment:  1. Injury of right shoulder, initial encounter      Likely AC joint sprain, does have muscular pain as well.  I recommend supportive care including rest, ice, OTC medications, sling for comfort, start ROM exercises.  Discussed return to sports criteria including pain free, full range of motion and full strength.  Follow up in 2 weeks.    Plan:  - Today's Plan of Care:  Over the counter medication: Patient's preferred OTC medication as directed on packaging.  Continue use of sling for comfort but wean as tolerated  Range of motion: pendulum exercises  Continue with Rest, Ice, Compression, and Elevation.  Apply ice 10-15 minutes every 2-3 hours    -We also discussed other future treatment options:  Rehab: Physical Therapy    Follow Up: 2 weeks    Concerning signs and symptoms were reviewed.  The patient expressed understanding of this management plan and all questions were answered at this time.    Fannie Edmond MD [facility]  Primary Care Sports Medicine  [facility]    Again, thank you for allowing me to participate in the care of your patient.        Sincerely,        Fannie Edmond MD

## 2018-09-17 NOTE — PROGRESS NOTES
Sports Medicine Clinic Visit - Interim History September 17, 2018    PCP: Kehr, Kristen M Cassie J Kaz is a 23 year old female who is seen in f/u up for Injury of right shoulder, initial encounter. Since last visit on 4/30/18, patient has right shoulder pain due to re-injury. She currently has her arm in a sling. She reports on 9/15/18 she was playing rugby when she was tackled and landed on the right shoulder re-injuring it. She reports pain is located in the superior shoulder and she has numbness and paresthesia to her right fingers and hand occasionally.  - Prior to this, she had seen a surgeon and has pursued non operative treatment for her previous injury. She has had a AC joint steroid injection performed by Dr. Kwabena Perez on 5/23/18. She reports the Steroid injection provided good relief in pain.    Symptoms are better with: Ibuprofen  Symptoms are worse with: any movement of her arm  Additional Features:   Positive: bruising, popping, grinding, instability, paresthesias, numbness and weakness   Negative: swelling    Social History: Student, Rugby and softball    Review of Systems  Skin: no bruising, no swelling  Musculoskeletal: as above  Neurologic: occasional numbness, paresthesias  Remainder of review of systems is negative including constitutional, CV, pulmonary, GI, except as noted in HPI or medical history.    Patient's current problem list, past medical and surgical history, and family history were reviewed.    Patient Active Problem List   Diagnosis     Melanocytic nevus of face     Ovarian cyst     BMI (body mass index), pediatric, 85% to less than 95% for age     Depression with anxiety     Insomnia     Encounter for initial prescription of injectable contraceptive     Morbid obesity (H)     Past Medical History:   Diagnosis Date     Allergic rhinitis      Allergies     dog     BMI (body mass index), pediatric, 85% to less than 95% for age 1/3/2013     Chronic GERD      Eczema       "Melanocytic nevus of face 12/2009     Past Surgical History:   Procedure Laterality Date     HC TOOTH EXTRACTION W/FORCEP  9/08     TONSILLECTOMY Bilateral 12/19/2016    Procedure: TONSILLECTOMY;  Surgeon: Ilya Montilla MD;  Location:  OR     Family History   Problem Relation Age of Onset     Hypertension Mother      Arthritis Mother      Lipids Father      High Cholesterol     Breast Cancer Maternal Grandmother      Cancer Maternal Grandmother      Diabetes Maternal Grandfather      Hypertension Maternal Grandfather      Cerebrovascular Disease Other      Thyroid Disease No family hx of      Glaucoma No family hx of      Macular Degeneration No family hx of        Objective  /87 (BP Location: Left arm, Patient Position: Chair, Cuff Size: Adult Regular)  Ht 5' 7\" (1.702 m)  Wt 272 lb (123.4 kg)  BMI 42.6 kg/m2    GENERAL APPEARANCE: healthy, alert and no distress   GAIT: NORMAL  SKIN: no suspicious lesions or rashes  HEENT: Sclera clear, anicteric  CV: good peripheral pulses  RESP: Breathing not labored  NEURO: Normal strength and tone, mentation intact and speech normal  PSYCH:  mentation appears normal and affect normal/bright    Bilateral Shoulder exam  Inspection and Posture:       normal    Skin:        no visible deformities    Tender:        acromioclavicular joint right       posterior shoulder right       proximal humerus right       upper trapezius right    Non Tender:       remainder of shoulder right    ROM:        forward flexion 120  right       abduction 120 right       internal rotation gluteal region right       external rotation 45 right    Painful motions:       flexion right       elevation right    Strength:        abduction 4/5 right       flexion 4/5 right       internal rotation 4/5 right       external rotation 4/5 right    Impingement testing:       positive (+) Neer right       positive (+) crossover right    Sensation:        normal sensation over shoulder and upper " extremity     Radiology  I ordered, visualized and reviewed these images with the patient  4 XR views of right shoulder reviewed: no acute bony abnormality, no significant degenerative change  - will follow official read    Assessment:  1. Injury of right shoulder, initial encounter      Likely AC joint sprain, does have muscular pain as well.  I recommend supportive care including rest, ice, OTC medications, sling for comfort, start ROM exercises.  Discussed return to sports criteria including pain free, full range of motion and full strength.  Follow up in 2 weeks.    Plan:  - Today's Plan of Care:  Over the counter medication: Patient's preferred OTC medication as directed on packaging.  Continue use of sling for comfort but wean as tolerated  Range of motion: pendulum exercises  Continue with Rest, Ice, Compression, and Elevation.  Apply ice 10-15 minutes every 2-3 hours    -We also discussed other future treatment options:  Rehab: Physical Therapy    Follow Up: 2 weeks    Concerning signs and symptoms were reviewed.  The patient expressed understanding of this management plan and all questions were answered at this time.    Fannie Edmond MD [facility]  Primary Care Sports Medicine  [facility]

## 2018-09-17 NOTE — MR AVS SNAPSHOT
After Visit Summary   9/17/2018    Meme Mccurdy    MRN: [id number]           Patient Information     Date Of Birth          1995        Visit Information        Provider Department      9/17/2018 3:00 PM Fannie Edmond MD [facility]        Today's Diagnoses     Injury of right shoulder, initial encounter    -  1      Care Instructions      Plan:  - Today's Plan of Care:  Over the counter medication: Patient's preferred OTC medication as directed on packaging.  Continue use of sling for comfort but wean as tolerated  Range of motion: pendulum exercises  Continue with Rest, Ice, Compression, and Elevation.  Apply ice 10-15 minutes every 2-3 hours    -We also discussed other future treatment options:  Rehab: Physical Therapy    Follow Up: 2 weeks    If you have any further questions for your physician or physician s care team you can call [phone] and use option 3 to leave a voice message. Calls received during business hours will be returned same day.              Follow-ups after your visit        Who to contact     If you have questions or need follow up information about today's clinic visit or your schedule please contact [facility] directly at [phone].  Normal or non-critical lab and imaging results will be communicated to you by MyChart, letter or phone within 4 business days after the clinic has received the results. If you do not hear from us within 7 days, please contact the clinic through MyChart or phone. If you have a critical or abnormal lab result, we will notify you by phone as soon as possible.  Submit refill requests through [email or web address] or call your pharmacy and they will forward the refill request to us. Please allow 3 business days for your refill to be completed.          Additional Information About Your Visit        Care EveryWhere ID     This is your Care EveryWhere ID. This could be used by other  "organizations to access your [place] medical records  [id number]        Your Vitals Were     Height BMI (Body Mass Index)                5' 7\" (1.702 m) 42.6 kg/m2           Blood Pressure from Last 3 Encounters:   09/17/18 138/87   09/12/18 122/84   08/15/18 140/87    Weight from Last 3 Encounters:   09/17/18 272 lb (123.4 kg)   09/12/18 272 lb (123.4 kg)   08/15/18 269 lb (122 kg)               Primary Care Provider Office Phone # Fax #    Kristen M Kehr, PA-C [phone] [address] [facility] [address]        Equal Access to Services     ALEKSANDRA SMITH : Hadii tatiana philippe hadasho Soomaali, waaxda luqadaha, qaybta kaalmada adeegyada, waxperla vega . So [facility] [phone].    ATENCIÓN: Si habla español, tiene a prabhakar disposición servicios gratuitos de asistencia lingüística. [facility] [phone].    We comply with applicable federal civil rights laws and [place] laws. We do not discriminate on the basis of race, color, national origin, age, disability, sex, sexual orientation, or gender identity.            Thank you!     Thank you for choosing [place]  for your care. Our goal is always to provide you with excellent care. Hearing back from our patients is one way we can continue to improve our services. Please take a few minutes to complete the written survey that you may receive in the mail after your visit with us. Thank you!             Your Updated Medication List - Protect others around you: Learn how to safely use, store and throw away your medicines at www.disposemymeds.org.          This list is accurate as of 9/17/18  3:15 PM.  Always use your most recent med list.                   Brand Name Dispense Instructions for use Diagnosis    diclofenac 1 % Gel topical gel    VOLTAREN    100 g    Apply 2 grams to shoulder up to four times daily using enclosed dosing card.    Injury of right acromioclavicular joint, initial " encounter       famotidine 40 MG tablet    PEPCID    30 tablet    Take 1 tablet (40 mg) by mouth At Bedtime    Gastroesophageal reflux disease without esophagitis       ibuprofen 800 MG tablet    ADVIL/MOTRIN    60 tablet    Take 1 tablet by mouth every 8 hours as needed for pain.    Follow-up exam after treatment       medroxyPROGESTERone 150 MG/ML injection    DEPO-PROVERA    1 mL    Inject 1 mL (150 mg) into the muscle every 3 months    Encounter for initial prescription of injectable contraceptive       sertraline 100 MG tablet    ZOLOFT    180 tablet    Take 2 tablets (200 mg) by mouth daily    Depression with anxiety

## 2018-10-08 ENCOUNTER — OFFICE VISIT (OUTPATIENT)
Dept: ORTHOPEDICS | Facility: CLINIC | Age: 23
End: 2018-10-08
Payer: COMMERCIAL

## 2018-10-08 VITALS
HEIGHT: 67 IN | DIASTOLIC BLOOD PRESSURE: 84 MMHG | BODY MASS INDEX: 42.85 KG/M2 | SYSTOLIC BLOOD PRESSURE: 134 MMHG | WEIGHT: 273 LBS

## 2018-10-08 DIAGNOSIS — S43.51XA SPRAIN OF RIGHT ACROMIOCLAVICULAR LIGAMENT, INITIAL ENCOUNTER: ICD-10-CM

## 2018-10-08 DIAGNOSIS — S49.91XD INJURY OF RIGHT SHOULDER, SUBSEQUENT ENCOUNTER: Primary | ICD-10-CM

## 2018-10-08 PROCEDURE — 99213 OFFICE O/P EST LOW 20 MIN: CPT | Performed by: PEDIATRICS

## 2018-10-08 NOTE — PATIENT INSTRUCTIONS
Plan:  - Today's Plan of Care:  Rehab: Physical Therapy: [facility] - [phone]    Follow Up: 6 weeks    If you have any further questions for your physician or physician s care team you can call [phone] and use option 3 to leave a voice message. Calls received during business hours will be returned same day.

## 2018-10-08 NOTE — MR AVS SNAPSHOT
After Visit Summary   10/8/2018    Meme Mccurdy    MRN: [id number]           Patient Information     Date Of Birth          1995        Visit Information        Provider Department      10/8/2018 9:20 AM Fannie Edmond MD [facility]        Today's Diagnoses     Injury of right shoulder, subsequent encounter    -  1    Sprain of right acromioclavicular ligament, initial encounter          Care Instructions      Plan:  - Today's Plan of Care:  Rehab: Physical Therapy: [facility] - [phone]    Follow Up: 6 weeks    If you have any further questions for your physician or physician s care team you can call [phone] and use option 3 to leave a voice message. Calls received during business hours will be returned same day.              Follow-ups after your visit        Additional Services     [facility] PT, HAND, AND CHIROPRACTIC REFERRAL       Physical Therapy, Hand Therapy and Chiropractic Care are available through:  *[facility]  *Hand Therapy (Occupational Therapy or Physical Therapy)  *[facility]    Call one number to schedule at any of the above locations: [phone].    Physical therapy, Hand therapy and/or Chiropractic care has been recommended by your physician as an excellent treatment option to reduce pain and help people return to normal activities, including sports.  Therapy and/or chiropractic care services are a great complement or alternative to expensive and invasive surgery, injections, or long-term use of prescription medications. The primary goal is to identify the underlying problem and provide you the tools to manage your condition on your own.     Please be aware that coverage of these services is subject to the terms and limitations of your health insurance plan.  Call member services at your health plan with any benefit or coverage questions.      Please bring the following  "to your appointment:  *Your personal calendar for scheduling future appointments  *Comfortable clothing                  Future tests that were ordered for you today     Open Future Orders        Priority Expected Expires Ordered    [facility] PT, HAND, AND CHIROPRACTIC REFERRAL Routine  10/8/2019 10/8/2018            Who to contact     If you have questions or need follow up information about today's clinic visit or your schedule please contact [facility] directly at [phone].  Normal or non-critical lab and imaging results will be communicated to you by MyChart, letter or phone within 4 business days after the clinic has received the results. If you do not hear from us within 7 days, please contact the clinic through [employer] or phone. If you have a critical or abnormal lab result, we will notify you by phone as soon as possible.  Submit refill requests through [employer] or call your pharmacy and they will forward the refill request to us. Please allow 3 business days for your refill to be completed.          Additional Information About Your Visit        Care EveryWhere ID     This is your Care EveryWhere ID. This could be used by other organizations to access your [place] medical records  [id number]        Your Vitals Were     Height BMI (Body Mass Index)                5' 7\" (1.702 m) 42.76 kg/m2           Blood Pressure from Last 3 Encounters:   10/08/18 134/84   09/17/18 138/87   09/12/18 122/84    Weight from Last 3 Encounters:   10/08/18 273 lb (123.8 kg)   09/17/18 272 lb (123.4 kg)   09/12/18 272 lb (123.4 kg)               Primary Care Provider Office Phone # Fax #    Kristen M Kehr, PA-C [phone] [address] [address] [facility] [address]        Equal Access to Services     ALEKSANDRA SMITH AH: Rui Michelle, wagretel luivet, qaybta kaalkristi dunn, juli roberson. So [facility] [phone].    ATENCIÓN: Si alex quezada, " tiene a prabhakar disposición servicios gratuitos de asistencia lingüística. Marta gonzalez [phone].    We comply with applicable federal civil rights laws and [place] laws. We do not discriminate on the basis of race, color, national origin, age, disability, sex, sexual orientation, or gender identity.            Thank you!     Thank you for choosing [place] [facility]  for your care. Our goal is always to provide you with excellent care. Hearing back from our patients is one way we can continue to improve our services. Please take a few minutes to complete the written survey that you may receive in the mail after your visit with us. Thank you!             Your Updated Medication List - Protect others around you: Learn how to safely use, store and throw away your medicines at www.disposemymeds.org.          This list is accurate as of 10/8/18  9:48 AM.  Always use your most recent med list.                   Brand Name Dispense Instructions for use Diagnosis    diclofenac 1 % Gel topical gel    VOLTAREN    100 g    Apply 2 grams to shoulder up to four times daily using enclosed dosing card.    Injury of right acromioclavicular joint, initial encounter       famotidine 40 MG tablet    PEPCID    30 tablet    Take 1 tablet (40 mg) by mouth At Bedtime    Gastroesophageal reflux disease without esophagitis       ibuprofen 800 MG tablet    ADVIL/MOTRIN    60 tablet    Take 1 tablet by mouth every 8 hours as needed for pain.    Follow-up exam after treatment       medroxyPROGESTERone 150 MG/ML injection    DEPO-PROVERA    1 mL    Inject 1 mL (150 mg) into the muscle every 3 months    Encounter for initial prescription of injectable contraceptive       sertraline 100 MG tablet    ZOLOFT    180 tablet    Take 2 tablets (200 mg) by mouth daily    Depression with anxiety

## 2018-10-08 NOTE — LETTER
10/8/2018         RE: Meme Mccurdy  [address] [facility] [address]        Dear Colleague,    Thank you for referring your patient, Meme Mccurdy, to the [facility]. Please see a copy of my visit note below.    Sports Medicine Clinic Visit - Interim History October 8, 2018    PCP: Kehr, Kristen M    Meme Mccurdy is a 23 year old female who is seen in f/u up for    Injury of right shoulder, subsequent encounter  Sprain of right acromioclavicular ligament, initial encounter.  Since last visit on 9/17/18, patient reports improved pain in the right shoulder. She has not needed to use the arm sling, however, she has not returned to rugby activities at this time. She has attempted to participate in softball, but has pain with over head throwing. She continues to complete arm pendulum exericses. She reports decrease in right arm numbness.    Symptoms are better with: Ibuprofen  Symptoms are worse with: overhead motions: throwing, and lifting arm above head  Additional Features:   Positive: popping, grinding, catching, locking, paresthesias, numbness and weakness   Negative: swelling, bruising and instability    Social History: Student, Rugby and softball    Review of Systems  Skin: no bruising, no swelling  Musculoskeletal: as above  Neurologic: no numbness, paresthesias  Remainder of review of systems is negative including constitutional, CV, pulmonary, GI, except as noted in HPI or medical history.    Patient's current problem list, past medical and surgical history, and family history were reviewed.    Patient Active Problem List   Diagnosis     Melanocytic nevus of face     Ovarian cyst     BMI (body mass index), pediatric, 85% to less than 95% for age     Depression with anxiety     Insomnia     Encounter for initial prescription of injectable contraceptive     Morbid obesity (H)     Past Medical History:   Diagnosis Date     Allergic rhinitis      Allergies     dog  "    BMI (body mass index), pediatric, 85% to less than 95% for age 1/3/2013     Chronic GERD      Eczema      Melanocytic nevus of face 12/2009     Past Surgical History:   Procedure Laterality Date     HC TOOTH EXTRACTION W/FORCEP  9/08     TONSILLECTOMY Bilateral 12/19/2016    Procedure: TONSILLECTOMY;  Surgeon: Ilya oMntilla MD;  Location: [facility] OR     Family History   Problem Relation Age of Onset     Hypertension Mother      Arthritis Mother      Lipids Father      High Cholesterol     Breast Cancer Maternal Grandmother      Cancer Maternal Grandmother      Diabetes Maternal Grandfather      Hypertension Maternal Grandfather      Cerebrovascular Disease Other      Thyroid Disease No family hx of      Glaucoma No family hx of      Macular Degeneration No family hx of        Objective  /84 (BP Location: Left arm, Patient Position: Chair, Cuff Size: Adult Regular)  Ht 5' 7\" (1.702 m)  Wt 273 lb (123.8 kg)  BMI 42.76 kg/m2    GENERAL APPEARANCE: healthy, alert and no distress   GAIT: NORMAL  SKIN: no suspicious lesions or rashes  HEENT: Sclera clear, anicteric  CV: good peripheral pulses  RESP: Breathing not labored  NEURO: Normal strength and tone, mentation intact and speech normal  PSYCH:  mentation appears normal and affect normal/bright    Bilateral Shoulder exam  Inspection and Posture:       normal     Skin:        no visible deformities     Tender:        acromioclavicular joint right     Non Tender:       remainder of shoulder right     ROM: Normal ROM on right compared to left, though asymmetric scapular motion     Painful motions:       end flexion  and elevation right     Strength:        abduction  5/5 right       flexion  5/5 right       internal rotation 5/5 right       external rotation 5/5 right     Impingement testing:       positive (+) crossover right     Sensation:        normal sensation over shoulder and upper extremity     Radiology  I visualized and reviewed these images with " the patient  Xr Shoulder Right G/e 3 Views  Result Date: 9/17/2018  XR SHOULDER RT G/E 3 VW 9/17/2018 3:02 PM HISTORY: ; Arthralgia of right acromioclavicular joint; Injury of glenoid labrum, right, subsequent encounter   IMPRESSION: Negative exam. LEON MEADOWS MD    Assessment:  1. Injury of right shoulder, subsequent encounter    2. Sprain of right acromioclavicular ligament, initial encounter      Right shoulder AC joint sprain, improving.  Still with scapular dysfunction.  We discussed referral to PT who can help with gradual return to activities.  Discussed return to sports criteria including pain free, full range of motion and full strength.    Plan:  - Today's Plan of Care:  Rehab: Physical Therapy: [facility] - [phone]    Follow Up: 6 weeks    Concerning signs and symptoms were reviewed.  The patient expressed understanding of this management plan and all questions were answered at this time.    Fannie Edmond MD [facility]  Primary Care Sports Medicine  [facility]    Again, thank you for allowing me to participate in the care of your patient.        Sincerely,        Fannie Edmond MD

## 2018-10-08 NOTE — PROGRESS NOTES
Sports Medicine Clinic Visit - Interim History October 8, 2018    PCP: Kehr, Kristen M Cassie J Kaz is a 23 year old female who is seen in f/u up for    Injury of right shoulder, subsequent encounter  Sprain of right acromioclavicular ligament, initial encounter.  Since last visit on 9/17/18, patient reports improved pain in the right shoulder. She has not needed to use the arm sling, however, she has not returned to rugby activities at this time. She has attempted to participate in softball, but has pain with over head throwing. She continues to complete arm pendulum exericses. She reports decrease in right arm numbness.    Symptoms are better with: Ibuprofen  Symptoms are worse with: overhead motions: throwing, and lifting arm above head  Additional Features:   Positive: popping, grinding, catching, locking, paresthesias, numbness and weakness   Negative: swelling, bruising and instability    Social History: Student, Rugby and softball    Review of Systems  Skin: no bruising, no swelling  Musculoskeletal: as above  Neurologic: no numbness, paresthesias  Remainder of review of systems is negative including constitutional, CV, pulmonary, GI, except as noted in HPI or medical history.    Patient's current problem list, past medical and surgical history, and family history were reviewed.    Patient Active Problem List   Diagnosis     Melanocytic nevus of face     Ovarian cyst     BMI (body mass index), pediatric, 85% to less than 95% for age     Depression with anxiety     Insomnia     Encounter for initial prescription of injectable contraceptive     Morbid obesity (H)     Past Medical History:   Diagnosis Date     Allergic rhinitis      Allergies     dog     BMI (body mass index), pediatric, 85% to less than 95% for age 1/3/2013     Chronic GERD      Eczema      Melanocytic nevus of face 12/2009     Past Surgical History:   Procedure Laterality Date     HC TOOTH EXTRACTION W/FORCEP  9/08     TONSILLECTOMY  "Bilateral 12/19/2016    Procedure: TONSILLECTOMY;  Surgeon: Ilya Montilla MD;  Location: [facility] OR     Family History   Problem Relation Age of Onset     Hypertension Mother      Arthritis Mother      Lipids Father      High Cholesterol     Breast Cancer Maternal Grandmother      Cancer Maternal Grandmother      Diabetes Maternal Grandfather      Hypertension Maternal Grandfather      Cerebrovascular Disease Other      Thyroid Disease No family hx of      Glaucoma No family hx of      Macular Degeneration No family hx of        Objective  /84 (BP Location: Left arm, Patient Position: Chair, Cuff Size: Adult Regular)  Ht 5' 7\" (1.702 m)  Wt 273 lb (123.8 kg)  BMI 42.76 kg/m2    GENERAL APPEARANCE: healthy, alert and no distress   GAIT: NORMAL  SKIN: no suspicious lesions or rashes  HEENT: Sclera clear, anicteric  CV: good peripheral pulses  RESP: Breathing not labored  NEURO: Normal strength and tone, mentation intact and speech normal  PSYCH:  mentation appears normal and affect normal/bright    Bilateral Shoulder exam  Inspection and Posture:       normal     Skin:        no visible deformities     Tender:        acromioclavicular joint right     Non Tender:       remainder of shoulder right     ROM: Normal ROM on right compared to left, though asymmetric scapular motion     Painful motions:      end flexion and elevation right     Strength:        abduction 5/5 right       flexion 5/5 right       internal rotation 5/5 right       external rotation 5/5 right     Impingement testing:       positive (+) crossover right     Sensation:        normal sensation over shoulder and upper extremity     Radiology  I visualized and reviewed these images with the patient  Xr Shoulder Right G/e 3 Views  Result Date: 9/17/2018  XR SHOULDER RT G/E 3 VW 9/17/2018 3:02 PM HISTORY: ; Arthralgia of right acromioclavicular joint; Injury of glenoid labrum, right, subsequent encounter   IMPRESSION: Negative exam. LEON " MD DORI    Assessment:  1. Injury of right shoulder, subsequent encounter    2. Sprain of right acromioclavicular ligament, initial encounter      Right shoulder AC joint sprain, improving.  Still with scapular dysfunction.  We discussed referral to PT who can help with gradual return to activities.  Discussed return to sports criteria including pain free, full range of motion and full strength.    Plan:  - Today's Plan of Care:  Rehab: Physical Therapy: [facility] - [phone]    Follow Up: 6 weeks    Concerning signs and symptoms were reviewed.  The patient expressed understanding of this management plan and all questions were answered at this time.    Fannie Edmond MD CA  Primary Care Sports Medicine  [facility]

## 2018-10-15 ENCOUNTER — THERAPY VISIT (OUTPATIENT)
Dept: PHYSICAL THERAPY | Facility: CLINIC | Age: 23
End: 2018-10-15
Attending: PEDIATRICS
Payer: COMMERCIAL

## 2018-10-15 DIAGNOSIS — S43.51XA SPRAIN OF RIGHT ACROMIOCLAVICULAR LIGAMENT, INITIAL ENCOUNTER: ICD-10-CM

## 2018-10-15 DIAGNOSIS — S49.91XD INJURY OF RIGHT SHOULDER, SUBSEQUENT ENCOUNTER: ICD-10-CM

## 2018-10-15 DIAGNOSIS — M25.511 SHOULDER PAIN, RIGHT: Primary | ICD-10-CM

## 2018-10-15 PROCEDURE — 97110 THERAPEUTIC EXERCISES: CPT | Mod: GP | Performed by: PHYSICAL THERAPIST

## 2018-10-15 PROCEDURE — 97161 PT EVAL LOW COMPLEX 20 MIN: CPT | Mod: GP | Performed by: PHYSICAL THERAPIST

## 2018-10-15 NOTE — PROGRESS NOTES
[facility] Initial Evaluation  Subjective:  Patient is a 23 year old female presenting with rehab right shoulder hpi. The history is provided by the patient.   Meme Mccurdy is a 23 year old female with a right shoulder condition.  Condition occurred with:  Repetition/overuse (History of pain with pitching and rugby).  Condition occurred: during recreation/sport.  This is a chronic condition  DOI: 10/8/2018  Increase of pain past 2 years  .    Patient reports pain:  Anterior and upper trap.  Radiates to: no radiation, injury in september radiation down arm to hand,  Pain is described as burning and is intermittent and reported as 5/10.  Associated symptoms:  Catching, loss of motion/stiffness and loss of strength. Pain is worse during the night.  Symptoms are exacerbated by certain positions, lifting, lying on extremity, using arm at shoulder level and using arm overhead and relieved by NSAID's and ice.  Since onset symptoms are gradually worsening.  Special tests:  X-ray and MRI.      General health as reported by patient is good.  Pertinent medical history includes:  Asthma, depression, mental illness and migraines/headaches.  Medical allergies: no.    Current medications:  Anti-depressants.  Current occupation is Student  .        Barriers include:  None as reported by the patient.    Red flags:  None as reported by the patient.                        Objective:  System              Cervical/Thoracic Evaluation    AROM:  AROM Cervical:    Flexion:            75  Extension:       56  Rotation:         Left: 65     Right: 61  Side Bend:      Left: 35     Right:  30      Headaches: none              Cervical Stability/Joint Clearing:  Stability/joint clearing spine: spurlings, compression, distraction negative.                Shoulder Evaluation:  ROM:  AROM:    Flexion:  Left:  160    Right:  150  Extension: Left: wnlRight: wnl  Abduction:  Left: 165   Right:  155                      PROM:             Internal Rotation:  Left:  Wnl    Right:  Wnl  External Rotation:  Left:  Wnl    Right:  Wnl                Pain: with endrange flexion, abduction    Strength:  : Pain noted along ACJ with resisted MMT.  Flexion: Left:5/5   Pain:    Right: 5-/5      Pain:  +    Abduction:  Left: 5/5  Pain:    Right: 4+/5      Pain:+    Internal Rotation:  Left:5/5     Pain:    Right: 5/5     Pain:  External Rotation:   Left:5/5     Pain:   Right:5/5     Pain:        Elbow Flexion:  Left:5/5     Pain:    Right:5/5     Pain:  Elbow Extension:  Left:5/5     Pain:    Right:5/5     Pain:    Special Tests:  Special tests assessed shoulder: Pain with end range flexion and abduction at her ACJ.     Left shoulder negative for the following special tests:  Labral; Impingement and Rotator cuff tear    Right shoulder negative for the following special tests:Labral; Impingement and Rotator cuff tear  Palpation:      Right shoulder tenderness present at: Acrimioclavicular  Right shoulder tenderness not present at:Clavicle or Biceps                                         General Evaluation:              Sensation:  normal                                                         ROS    Assessment/Plan:    Patient is a 23 year old female with right side shoulder complaints.    Patient has the following significant findings with corresponding treatment plan.                Diagnosis 1:  Right shoulder pain with signs and symptoms consistent with AC joint sprain    Pain -  manual therapy  Decreased ROM/flexibility - manual therapy, therapeutic exercise and home program  Decreased strength - therapeutic exercise, therapeutic activities and home program  Impaired muscle performance - neuro re-education  Decreased function - therapeutic activities    Therapy Evaluation Codes:   1) History comprised of:   Personal factors that impact the plan of care:      None.    Comorbidity factors that impact the plan of care are:      Asthma, Depression,  Mental illness and Migraines/headaches.     Medications impacting care: Anti-depressant.  2) Examination of Body Systems comprised of:   Body structures and functions that impact the plan of care:      Shoulder.   Activity limitations that impact the plan of care are:      Dressing, Lifting, Sleeping and reaching, overhead activities..  3) Clinical presentation characteristics are:   Stable/Uncomplicated.  4) Decision-Making    Low complexity using standardized patient assessment instrument and/or measureable assessment of functional outcome.  Cumulative Therapy Evaluation is: Low complexity.    Previous and current functional limitations:  (See Goal Flow Sheet for this information)    Short term and Long term goals: (See Goal Flow Sheet for this information)     Communication ability:  Patient appears to be able to clearly communicate and understand verbal and written communication and follow directions correctly.  Treatment Explanation - The following has been discussed with the patient:   RX ordered/plan of care  Anticipated outcomes  Possible risks and side effects  This patient would benefit from PT intervention to resume normal activities.   Rehab potential is good.    Frequency:  1 X week, once daily  Duration:  for 8 weeks  Discharge Plan:  Achieve all LTG.  Independent in home treatment program.  Reach maximal therapeutic benefit.    Please refer to the daily flowsheet for treatment today, total treatment time and time spent performing 1:1 timed codes.

## 2018-10-15 NOTE — MR AVS SNAPSHOT
After Visit Summary   10/15/2018    Meme Mccurdy    MRN: [id number]           Patient Information     Date Of Birth          1995        Visit Information        Provider Department      10/15/2018 11:20 AM Noel Huddleston [facility]        Today's Diagnoses     Shoulder pain, right    -  1    Injury of right shoulder, subsequent encounter        Sprain of right acromioclavicular ligament, initial encounter           Follow-ups after your visit        Your next 10 appointments already scheduled     Oct 22, 2018  5:50 PM CDT   KATHERINE Extremity with Noel Huddleston   [facility] ([facility] [place])    [address]  [address]  [place] [address]   [phone]            Nov 05, 2018  5:50 PM CST   KATHERINE Extremity with Noel Huddleston   [facility] ([facility] [place])    [address]  [address]  [place] [address]   [phone]            Nov 12, 2018  5:50 PM CST   KATHERINE Extremity with Noel Huddleston   [facility] ([facility] [place])    [address]  [address]  [place] [address]   [phone]            Nov 19, 2018  5:50 PM CST   KATHERINE Extremity with Noel Huddleston   [facility] ([facility] [place])    [address]  [address]  [place] [address]   [phone]            Nov 26, 2018  5:50 PM CST   [facility] [place]    [address]  [address]  [place] [address]   [phone]            Dec 03, 2018  5:50 PM CST   [facility] [place] [facility] [place]    [address]  [address]  [place] [address]   [phone]              Who to contact     If you have questions or need follow up information about today's clinic visit or your schedule please  contact [facility] directly at [phone].  Normal or non-critical lab and imaging results will be communicated to you by MyChart, letter or phone within 4 business days after the clinic has received the results. If you do not hear from us within 7 days, please contact the clinic through MyChart or phone. If you have a critical or abnormal lab result, we will notify you by phone as soon as possible.  Submit refill requests through [employer] or call your pharmacy and they will forward the refill request to us. Please allow 3 business days for your refill to be completed.          Additional Information About Your Visit        Care EveryWhere ID     This is your Care EveryWhere ID. This could be used by other organizations to access your [facility] medical records  [id number]         Blood Pressure from Last 3 Encounters:   10/08/18 134/84   09/17/18 138/87   09/12/18 122/84    Weight from Last 3 Encounters:   10/08/18 123.8 kg (273 lb)   09/17/18 123.4 kg (272 lb)   09/12/18 123.4 kg (272 lb)              We Performed the Following     HC PT EVAL, LOW COMPLEXITY     KATHERINE INITIAL EVAL REPORT     [facility] PT, HAND, AND CHIROPRACTIC REFERRAL     THERAPEUTIC EXERCISES        Primary Care Provider Office Phone # Fax #    Kristen M Kehr, PA-C [phone] [address] [address] [address]        Equal Access to Services     ALEKSANDRA SMITH : Hadii tatiana philippe hadasho Sosarahali, waaxda luqadaha, qaybta kaalmada mona, juli vega . So [facility] [phone].    ATENCIÓN: Si habla español, tiene a prabhakar disposición servicios gratuitos de asistencia lingüística. Marta al [phone].    We comply with applicable federal civil rights laws and [place] laws. We do not discriminate on the basis of race, color, national origin, age, disability, sex, sexual orientation, or gender identity.            Thank you!     Thank you for choosing [facility]  for your care. Our goal is always to provide you with excellent care. Hearing back from our patients is one way we can continue to improve our services. Please take a few minutes to complete the written survey that you may receive in the mail after your visit with us. Thank you!             Your Updated Medication List - Protect others around you: Learn how to safely use, store and throw away your medicines at www.disposemymeds.org.          This list is accurate as of 10/15/18 11:59 PM.  Always use your most recent med list.                   Brand Name Dispense Instructions for use Diagnosis    diclofenac 1 % Gel topical gel    VOLTAREN    100 g    Apply 2 grams to shoulder up to four times daily using enclosed dosing card.    Injury of right acromioclavicular joint, initial encounter       famotidine 40 MG tablet    PEPCID    30 tablet    Take 1 tablet (40 mg) by mouth At Bedtime    Gastroesophageal reflux disease without esophagitis       ibuprofen 800 MG tablet    ADVIL/MOTRIN    60 tablet    Take 1 tablet by mouth every 8 hours as needed for pain.    Follow-up exam after treatment       medroxyPROGESTERone 150 MG/ML injection    DEPO-PROVERA    1 mL    Inject 1 mL (150 mg) into the muscle every 3 months    Encounter for initial prescription of injectable contraceptive       sertraline 100 MG tablet    ZOLOFT    180 tablet    Take 2 tablets (200 mg) by mouth daily    Depression with anxiety

## 2018-10-22 ENCOUNTER — THERAPY VISIT (OUTPATIENT)
Dept: PHYSICAL THERAPY | Facility: CLINIC | Age: 23
End: 2018-10-22
Payer: COMMERCIAL

## 2018-10-22 DIAGNOSIS — M25.511 CHRONIC RIGHT SHOULDER PAIN: ICD-10-CM

## 2018-10-22 DIAGNOSIS — G89.29 CHRONIC RIGHT SHOULDER PAIN: ICD-10-CM

## 2018-10-22 PROCEDURE — 97140 MANUAL THERAPY 1/> REGIONS: CPT | Mod: GP | Performed by: PHYSICAL THERAPIST

## 2018-10-22 PROCEDURE — 97110 THERAPEUTIC EXERCISES: CPT | Mod: GP | Performed by: PHYSICAL THERAPIST

## 2018-10-22 PROCEDURE — 97112 NEUROMUSCULAR REEDUCATION: CPT | Mod: GP | Performed by: PHYSICAL THERAPIST

## 2018-10-22 NOTE — MR AVS SNAPSHOT
After Visit Summary   10/22/2018    Meme Mccurdy    MRN: [id number]           Patient Information     Date Of Birth          1995        Visit Information        Provider Department      10/22/2018 5:50 PM Noel Huddelston, PT [facility]        Today's Diagnoses     Chronic right shoulder pain           Follow-ups after your visit        Your next 10 appointments already scheduled     Nov 05, 2018  5:50 PM CST   KATHERINE Extremity with Noel uHddleston PT   [facility] [place] [facility] ([facility] [place])    [address]  [address]  [place] [address]   [phone]            Nov 12, 2018  5:50 PM CST   KATHERINE Extremity with Noel Huddleston PT   [facility] [place] [facility] ([facility] [place])    [address]  [address]  [place] [address]   [phone]            Nov 19, 2018  5:50 PM CST   KATHERINE Extremity with Noel Huddleston PT   [facility] [facility] ([facility] [place])    [address]  [address]  [place] [address]   [phone]            Nov 26, 2018  5:50 PM CST   KATHERINE Extremity with Noel Huddleston PT   [facility] ([facility] [place])    [address]  [address]  [place] [address]   [phone]            Dec 03, 2018  5:50 PM CST   KATHERINE Extremity with Noel Huddleston PT   [facility] [place] [facility] ([facility] [place])    [address]  [address]  [place] [address]   [phone]              Who to contact     If you have questions or need follow up information about today's clinic visit or your schedule please contact [facility] directly at [phone].  Normal or non-critical lab and imaging results will be communicated to you by MyChart, letter or phone within 4 business days after the clinic has received the results. If you do not hear from us within 7 days, please contact the clinic  through [employer] or phone. If you have a critical or abnormal lab result, we will notify you by phone as soon as possible.  Submit refill requests through [employer] or call your pharmacy and they will forward the refill request to us. Please allow 3 business days for your refill to be completed.          Additional Information About Your Visit        Care EveryWhere ID     This is your Care EveryWhere ID. This could be used by other organizations to access your [facility] medical records  [id number]         Blood Pressure from Last 3 Encounters:   10/08/18 134/84   09/17/18 138/87   09/12/18 122/84    Weight from Last 3 Encounters:   10/08/18 123.8 kg (273 lb)   09/17/18 123.4 kg (272 lb)   09/12/18 123.4 kg (272 lb)              We Performed the Following     MANUAL THER TECH,1+REGIONS,EA 15 MIN     NEUROMUSCULAR RE-EDUCATION     THERAPEUTIC EXERCISES        Primary Care Provider Office Phone # Fax #    Kristen M Kehr, PA-C [phone] [address] [address] [address]        Equal Access to Services     [facility]: Hadii aad ku hadasho Soomaali, waaxda luqadaha, qaybta kaalmada adeegyada, waxperla vega . So [facility] [phone].    ATENCIÓN: Si habla español, tiene a prabhakar disposición servicios gratTuba City Regional Health Care Corporationos de asistencia lingüística. [place] [phone].    We comply with applicable federal civil rights laws and [place] laws. We do not discriminate on the basis of race, color, national origin, age, disability, sex, sexual orientation, or gender identity.            Thank you!     Thank you for choosing [facility]  for your care. Our goal is always to provide you with excellent care. Hearing back from our patients is one way we can continue to improve our services. Please take a few minutes to complete the written survey that you may receive in the mail after your visit with us. Thank you!             Your Updated Medication List -  Protect others around you: Learn how to safely use, store and throw away your medicines at www.disposemymeds.org.          This list is accurate as of 10/22/18 11:59 PM.  Always use your most recent med list.                   Brand Name Dispense Instructions for use Diagnosis    diclofenac 1 % Gel topical gel    VOLTAREN    100 g    Apply 2 grams to shoulder up to four times daily using enclosed dosing card.    Injury of right acromioclavicular joint, initial encounter       famotidine 40 MG tablet    PEPCID    30 tablet    Take 1 tablet (40 mg) by mouth At Bedtime    Gastroesophageal reflux disease without esophagitis       ibuprofen 800 MG tablet    ADVIL/MOTRIN    60 tablet    Take 1 tablet by mouth every 8 hours as needed for pain.    Follow-up exam after treatment       medroxyPROGESTERone 150 MG/ML injection    DEPO-PROVERA    1 mL    Inject 1 mL (150 mg) into the muscle every 3 months    Encounter for initial prescription of injectable contraceptive       sertraline 100 MG tablet    ZOLOFT    180 tablet    Take 2 tablets (200 mg) by mouth daily    Depression with anxiety

## 2018-10-23 PROBLEM — M25.511 SHOULDER PAIN, RIGHT: Status: ACTIVE | Noted: 2018-10-23

## 2018-10-23 NOTE — PROGRESS NOTES
Subjective:  HPI                    Objective:  System    Physical Exam    General     ROS    Assessment/Plan:    SUBJECTIVE  Subjective: Patient reported that her shoulder is doing ok and that she has been performing her exercises consistently.    Current Pain level: 7/10   Changes in function:  Yes (See Goal flowsheet attached for changes in current functional level)     Adverse reaction to treatment or activity:  None    OBJECTIVE  Changes in objective findings:  Yes  Objective: Shoulder ROM: Flexion 150, abduction 155. pain level 6/10 at end range. Reduction of pain with distraction/MWM.      ASSESSMENT  Meme continues to require intervention to meet STG and LTG's: PT  Patient is progressing as expected.  No change of symptoms has been noted.  Response to therapy has shown an improvement in  muscle control  Progress made towards STG/LTG?  no (See Goal flowsheet attached for updates on achievement of STG and LTG)    PLAN  Continue current treatment plan until patient demonstrates readiness to progress to higher level exercises.    PTA/ATC plan:  N/A    Please refer to the daily flowsheet for treatment today, total treatment time and time spent performing 1:1 timed codes.

## 2018-11-08 ENCOUNTER — ALLIED HEALTH/NURSE VISIT (OUTPATIENT)
Dept: NURSING | Facility: CLINIC | Age: 23
End: 2018-11-08
Payer: COMMERCIAL

## 2018-11-08 VITALS
HEART RATE: 79 BPM | SYSTOLIC BLOOD PRESSURE: 126 MMHG | DIASTOLIC BLOOD PRESSURE: 87 MMHG | BODY MASS INDEX: 42.76 KG/M2 | WEIGHT: 273 LBS

## 2018-11-08 DIAGNOSIS — Z30.42 SURVEILLANCE FOR INJECTABLE MEDROXYPROGESTERONE/ESTRADIOL: Primary | ICD-10-CM

## 2018-11-08 DIAGNOSIS — Z30.013 ENCOUNTER FOR INITIAL PRESCRIPTION OF INJECTABLE CONTRACEPTIVE: Primary | ICD-10-CM

## 2018-11-08 PROCEDURE — 99207 ZZC NO CHARGE NURSE ONLY: CPT

## 2018-11-08 NOTE — PROGRESS NOTES
BP: 126/87    LAST PAP/EXAM: Lab Results       Component                Value               Date                       PAP                      NIL                 03/19/2018            URINE HCG:not indicated    The following medication was given:     MEDICATION: Depo Provera 150mg  ROUTE: IM  SITE: Ventrogluteal - Right  : [employer]  LOT #: [id number]  EXP:5/2020  NEXT INJECTION DUE: 1/24/19 - 2/7/19   Provider: kristen Kehr.  Shereen Naqvi MA

## 2018-11-08 NOTE — MR AVS SNAPSHOT
After Visit Summary   11/8/2018    Meme Mccurdy    MRN: [id number]           Patient Information     Date Of Birth          1995        Visit Information        Provider Department      11/8/2018 10:50 AM AN ANCILLARY [facility]        Today's Diagnoses     Encounter for initial prescription of injectable contraceptive    -  1       Follow-ups after your visit        Who to contact     If you have questions or need follow up information about today's clinic visit or your schedule please contact [facility] directly at [phone].  Normal or non-critical lab and imaging results will be communicated to you by MyChart, letter or phone within 4 business days after the clinic has received the results. If you do not hear from us within 7 days, please contact the clinic through [employer] or phone. If you have a critical or abnormal lab result, we will notify you by phone as soon as possible.  Submit refill requests through [employer] or call your pharmacy and they will forward the refill request to us. Please allow 3 business days for your refill to be completed.          Additional Information About Your Visit        MyChart Information     [employer] gives you secure access to your electronic health record. If you see a primary care provider, you can also send messages to your care team and make appointments. If you have questions, please call your primary care clinic.  If you do not have a primary care provider, please call [phone] and they will assist you.        Care EveryWhere ID     This is your Care EveryWhere ID. This could be used by other organizations to access your [employer] medical records  [id number]        Your Vitals Were     Pulse BMI (Body Mass Index)                79 42.76 kg/m2           Blood Pressure from Last 3 Encounters:   12/03/18 131/89   11/27/18 131/80   11/08/18 126/87    Weight from Last 3 Encounters:   12/03/18 268 lb (121.6 kg)   11/27/18 272  lb (123.4 kg)   11/08/18 273 lb (123.8 kg)              Today, you had the following     No orders found for display         Today's Medication Changes          These changes are accurate as of 11/8/18 11:59 PM.  If you have any questions, ask your nurse or doctor.               Start taking these medicines.        Dose/Directions    medroxyPROGESTERone 150 MG/ML IM injection   Commonly known as:  DEPO-PROVERA   Used for:  Surveillance for injectable medroxyprogesterone/estradiol   Started by:  Kehr, Kristen M, PA-C        Dose:  150 mg   Inject 1 mL (150 mg) into the muscle every 3 months   Quantity:  1 mL   Refills:  3            Where to get your medicines      Some of these will need a paper prescription and others can be bought over the counter.  Ask your nurse if you have questions.     Bring a paper prescription for each of these medications     medroxyPROGESTERone 150 MG/ML IM injection                Primary Care Provider Office Phone # Fax #    Kristen M Kehr, PA-C [phone] [address] [facility] [address]        Equal Access to Services     [facility]: Hadii tatiana ku hadasho Soomaali, waaxda luqadaha, qaybta kaalmada adeegyada, waxperla vega . So [facility] [phone].    ATENCIÓN: Si habla español, tiene a prabhakar disposición servicios gratuitos de asistencia lingüística. Llame al [phone].    We comply with applicable federal civil rights laws and [place] laws. We do not discriminate on the basis of race, color, national origin, age, disability, sex, sexual orientation, or gender identity.            Thank you!     Thank you for choosing [facility]  for your care. Our goal is always to provide you with excellent care. Hearing back from our patients is one way we can continue to improve our services. Please take a few minutes to complete the written survey that you may receive in the mail after your visit with us. Thank you!              Your Updated Medication List - Protect others around you: Learn how to safely use, store and throw away your medicines at www.disposemymeds.org.          This list is accurate as of 11/8/18 11:59 PM.  Always use your most recent med list.                   Brand Name Dispense Instructions for use Diagnosis    ibuprofen 800 MG tablet    ADVIL/MOTRIN    60 tablet    Take 1 tablet by mouth every 8 hours as needed for pain.    Follow-up exam after treatment       medroxyPROGESTERone 150 MG/ML IM injection    DEPO-PROVERA    1 mL    Inject 1 mL (150 mg) into the muscle every 3 months    Surveillance for injectable medroxyprogesterone/estradiol       sertraline 100 MG tablet    ZOLOFT    180 tablet    Take 2 tablets (200 mg) by mouth daily    Depression with anxiety

## 2018-11-13 RX ORDER — MEDROXYPROGESTERONE ACETATE 150 MG/ML
150 INJECTION, SUSPENSION INTRAMUSCULAR
Qty: 1 ML | Refills: 3 | Status: SHIPPED | OUTPATIENT
Start: 2018-11-13 | End: 2019-12-11

## 2018-11-27 ENCOUNTER — OFFICE VISIT (OUTPATIENT)
Dept: FAMILY MEDICINE | Facility: CLINIC | Age: 23
End: 2018-11-27
Payer: COMMERCIAL

## 2018-11-27 VITALS
OXYGEN SATURATION: 98 % | BODY MASS INDEX: 42.6 KG/M2 | SYSTOLIC BLOOD PRESSURE: 131 MMHG | HEART RATE: 91 BPM | RESPIRATION RATE: 14 BRPM | TEMPERATURE: 98.5 F | DIASTOLIC BLOOD PRESSURE: 80 MMHG | WEIGHT: 272 LBS

## 2018-11-27 DIAGNOSIS — L30.4 INTERTRIGO: Primary | ICD-10-CM

## 2018-11-27 PROCEDURE — 99213 OFFICE O/P EST LOW 20 MIN: CPT | Performed by: PHYSICIAN ASSISTANT

## 2018-11-27 RX ORDER — NYSTATIN 100000 [USP'U]/G
POWDER TOPICAL 3 TIMES DAILY PRN
Qty: 60 G | Refills: 1 | Status: SHIPPED | OUTPATIENT
Start: 2018-11-27 | End: 2020-12-14

## 2018-11-27 RX ORDER — NYSTATIN 100000 U/G
CREAM TOPICAL 3 TIMES DAILY
Qty: 30 G | Refills: 11 | Status: SHIPPED | OUTPATIENT
Start: 2018-11-27 | End: 2018-12-11

## 2018-11-27 ASSESSMENT — ANXIETY QUESTIONNAIRES
2. NOT BEING ABLE TO STOP OR CONTROL WORRYING: MORE THAN HALF THE DAYS
6. BECOMING EASILY ANNOYED OR IRRITABLE: NEARLY EVERY DAY
1. FEELING NERVOUS, ANXIOUS, OR ON EDGE: MORE THAN HALF THE DAYS
3. WORRYING TOO MUCH ABOUT DIFFERENT THINGS: MORE THAN HALF THE DAYS
5. BEING SO RESTLESS THAT IT IS HARD TO SIT STILL: SEVERAL DAYS
GAD7 TOTAL SCORE: 12
7. FEELING AFRAID AS IF SOMETHING AWFUL MIGHT HAPPEN: SEVERAL DAYS

## 2018-11-27 ASSESSMENT — PATIENT HEALTH QUESTIONNAIRE - PHQ9
5. POOR APPETITE OR OVEREATING: SEVERAL DAYS
SUM OF ALL RESPONSES TO PHQ QUESTIONS 1-9: 17

## 2018-11-27 ASSESSMENT — PAIN SCALES - GENERAL: PAINLEVEL: NO PAIN (0)

## 2018-11-27 NOTE — PROGRESS NOTES
SUBJECTIVE:   Meme Mccurdy is a 23 year old female who presents to clinic today for the following health issues:      Possible yeast or rash under left breast for couple of weeks.   She has been using over the counter cortisone cream with no relief.       Problem list and histories reviewed & adjusted, as indicated.  Additional history: as documented    Patient Active Problem List   Diagnosis     Melanocytic nevus of face     Ovarian cyst     BMI (body mass index), pediatric, 85% to less than 95% for age     Depression with anxiety     Insomnia     Encounter for initial prescription of injectable contraceptive     Morbid obesity (H)     Shoulder pain, right     Past Surgical History:   Procedure Laterality Date     HC TOOTH EXTRACTION W/FORCEP  9/08     TONSILLECTOMY Bilateral 12/19/2016    Procedure: TONSILLECTOMY;  Surgeon: Ilya Montilla MD;  Location:  OR       Social History   Substance Use Topics     Smoking status: Never Smoker     Smokeless tobacco: Never Used     Alcohol use No     Family History   Problem Relation Age of Onset     Hypertension Mother      Arthritis Mother      Lipids Father      High Cholesterol     Breast Cancer Maternal Grandmother      Cancer Maternal Grandmother      Diabetes Maternal Grandfather      Hypertension Maternal Grandfather      Cerebrovascular Disease Other      Thyroid Disease No family hx of      Glaucoma No family hx of      Macular Degeneration No family hx of          Allergies   Allergen Reactions     Nkda [No Known Drug Allergies]        Reviewed and updated as needed this visit by clinical staff       Reviewed and updated as needed this visit by Provider         ROS:  Constitutional, HEENT, cardiovascular, pulmonary, GI, , musculoskeletal, neuro, skin, endocrine and psych systems are negative, except as otherwise noted.    OBJECTIVE:     /80  Pulse 91  Temp 98.5  F (36.9  C) (Oral)  Resp 14  Wt 272 lb (123.4 kg)  SpO2 98%  BMI 42.6  kg/m2  Body mass index is 42.6 kg/(m^2).  GENERAL: healthy, alert and no distress  SKIN: brown / red discoloration under both breasts, skin is intact.     Diagnostic Test Results:  none     ASSESSMENT/PLAN:       1. Intertrigo  Treat with nystatin.   Stop use of cortisone.   She will return to the [facility] if symptoms worsen or persist.   - nystatin (MYCOSTATIN) cream; Apply topically 3 times daily for 14 days  Dispense: 30 g; Refill: 11  - nystatin (MYCOSTATIN) 409934 UNIT/GM POWD; Apply topically 3 times daily as needed  Dispense: 60 g; Refill: 1        Kristen M. Kehr, PA-C  [facility]

## 2018-11-27 NOTE — NURSING NOTE
"Chief Complaint   Patient presents with     Derm Problem     possible yeast or rash under left breast       Initial /80  Pulse 91  Temp 98.5  F (36.9  C) (Oral)  Resp 14  Wt 272 lb (123.4 kg)  SpO2 98%  BMI 42.6 kg/m2 Estimated body mass index is 42.6 kg/(m^2) as calculated from the following:    Height as of 10/8/18: 5' 7\" (1.702 m).    Weight as of this encounter: 272 lb (123.4 kg).  Medication Reconciliation: complete    JYOTI Marquez MA    "

## 2018-11-27 NOTE — MR AVS SNAPSHOT
After Visit Summary   11/27/2018    Meme Mccurdy    MRN: [id number]           Patient Information     Date Of Birth          1995        Visit Information        Provider Department      11/27/2018 9:20 AM Kehr, Kristen M, PA-C [facility]        Today's Diagnoses     Intertrigo    -  1       Follow-ups after your visit        Follow-up notes from your care team     Return in about 1 month (around 12/27/2018) for as needed if symptoms worsen or persist.      Your next 10 appointments already scheduled     Dec 03, 2018  5:50 PM CST   KATHERINE Extremity with Noel Huddleston, CAYLA   [facility] [place] [facility] ([facility])    [address]  [address]  [place] [address] [phone]              Who to contact     If you have questions or need follow up information about today's clinic visit or your schedule please contact [facility] directly at [phone].  Normal or non-critical lab and imaging results will be communicated to you by MyChart, letter or phone within 4 business days after the clinic has received the results. If you do not hear from us within 7 days, please contact the clinic through MyChart or phone. If you have a critical or abnormal lab result, we will notify you by phone as soon as possible.  Submit refill requests through [employer] or call your pharmacy and they will forward the refill request to us. Please allow 3 business days for your refill to be completed.          Additional Information About Your Visit        Care EveryWhere ID     This is your Care EveryWhere ID. This could be used by other organizations to access your [place] medical records  [id number]        Your Vitals Were     Pulse Temperature Respirations Pulse Oximetry BMI (Body Mass Index)       91 98.5  F (36.9  C) (Oral) 14 98% 42.6 kg/m2        Blood Pressure from Last 3 Encounters:   11/27/18 131/80   11/08/18 126/87   10/08/18 134/84    Weight from Last  3 Encounters:   11/27/18 272 lb (123.4 kg)   11/08/18 273 lb (123.8 kg)   10/08/18 273 lb (123.8 kg)              Today, you had the following     No orders found for display         Today's Medication Changes          These changes are accurate as of 11/27/18  9:24 AM.  If you have any questions, ask your nurse or doctor.               Start taking these medicines.        Dose/Directions    * nystatin cream   Commonly known as:  MYCOSTATIN   Used for:  Intertrigo   Started by:  Kehr, Kristen M, PA-C        Apply topically 3 times daily for 14 days   Quantity:  30 g   Refills:  11       * nystatin 457393 UNIT/GM Powd   Commonly known as:  MYCOSTATIN   Used for:  Intertrigo   Started by:  Kehr, Kristen M, PA-C        Apply topically 3 times daily as needed   Quantity:  60 g   Refills:  1       * Notice:  This list has 2 medication(s) that are the same as other medications prescribed for you. Read the directions carefully, and ask your doctor or other care provider to review them with you.         Where to get your medicines      These medications were sent to [id number] - [place] - [address]  [address], [facility] [address]    Hours:  Same info as [facility] [place] Phone:  [phone]     nystatin 730905 UNIT/GM Powd    nystatin cream                Primary Care Provider Office Phone # Fax #    Kristen M Kehr, PA-C [phone] [address] [facility] [address]        Equal Access to Services     [facility]: Hadii aad ku hadasho Soomaali, waaxda luqadaha, qaybta kaalmada adeegyada, waxay yaseminin hayeven mary kate roberson. So [facility] [phone].    ATENCIÓN: Si habla español, tiene a prabhakar disposición servicios gratuitos de asistencia lingüística. Llame al [phone].    We comply with applicable federal civil rights laws and [place] laws. We do not discriminate on the basis of race, color, national origin, age, disability, sex, sexual orientation, or gender  identity.            Thank you!     Thank you for choosing [facility]  [facility]. Our goal is always to provide you with excellent care. Hearing back from our patients is one way we can continue to improve our services. Please take a few minutes to complete the written survey that you may receive in the mail after your visit with us. Thank you!             Your Updated Medication List - Protect others around you: Learn how to safely use, store and throw away your medicines at www.disposemymeds.org.          This list is accurate as of 11/27/18  9:24 AM.  Always use your most recent med list.                   Brand Name Dispense Instructions for use Diagnosis    ibuprofen 800 MG tablet    ADVIL/MOTRIN    60 tablet    Take 1 tablet by mouth every 8 hours as needed for pain.    Follow-up exam after treatment       medroxyPROGESTERone 150 MG/ML injection    DEPO-PROVERA    1 mL    Inject 1 mL (150 mg) into the muscle every 3 months    Surveillance for injectable medroxyprogesterone/estradiol       * nystatin cream    MYCOSTATIN    30 g    Apply topically 3 times daily for 14 days    Intertrigo       * nystatin 480428 UNIT/GM Powd    MYCOSTATIN    60 g    Apply topically 3 times daily as needed    Intertrigo       sertraline 100 MG tablet    ZOLOFT    180 tablet    Take 2 tablets (200 mg) by mouth daily    Depression with anxiety       * Notice:  This list has 2 medication(s) that are the same as other medications prescribed for you. Read the directions carefully, and ask your doctor or other care provider to review them with you.

## 2018-11-28 ASSESSMENT — ANXIETY QUESTIONNAIRES: GAD7 TOTAL SCORE: 12

## 2018-12-03 ENCOUNTER — OFFICE VISIT (OUTPATIENT)
Dept: FAMILY MEDICINE | Facility: CLINIC | Age: 23
End: 2018-12-03
Payer: COMMERCIAL

## 2018-12-03 VITALS
TEMPERATURE: 97.4 F | SYSTOLIC BLOOD PRESSURE: 131 MMHG | OXYGEN SATURATION: 100 % | HEART RATE: 88 BPM | WEIGHT: 268 LBS | DIASTOLIC BLOOD PRESSURE: 89 MMHG | RESPIRATION RATE: 18 BRPM | BODY MASS INDEX: 41.97 KG/M2

## 2018-12-03 DIAGNOSIS — R19.7 VOMITING AND DIARRHEA: Primary | ICD-10-CM

## 2018-12-03 DIAGNOSIS — R11.10 VOMITING AND DIARRHEA: Primary | ICD-10-CM

## 2018-12-03 LAB
DEPRECATED S PYO AG THROAT QL EIA: NORMAL
SPECIMEN SOURCE: NORMAL

## 2018-12-03 PROCEDURE — 87880 STREP A ASSAY W/OPTIC: CPT | Performed by: FAMILY MEDICINE

## 2018-12-03 PROCEDURE — 99213 OFFICE O/P EST LOW 20 MIN: CPT | Performed by: FAMILY MEDICINE

## 2018-12-03 PROCEDURE — 87081 CULTURE SCREEN ONLY: CPT | Performed by: FAMILY MEDICINE

## 2018-12-03 RX ORDER — ONDANSETRON 4 MG/1
4-8 TABLET, ORALLY DISINTEGRATING ORAL EVERY 6 HOURS PRN
Qty: 20 TABLET | Refills: 1 | Status: SHIPPED | OUTPATIENT
Start: 2018-12-03 | End: 2019-12-11

## 2018-12-03 ASSESSMENT — PAIN SCALES - GENERAL: PAINLEVEL: NO PAIN (0)

## 2018-12-03 NOTE — PROGRESS NOTES
SUBJECTIVE:   Meme Mccurdy is a 23 year old female who presents to clinic today for the following health issues:        RESPIRATORY SYMPTOMS      Duration: 3 days    Description  nasal congestion, cough, fatigue/malaise, nausea, stomach ache and diarrhea    Severity: moderate    Accompanying signs and symptoms: None    History (predisposing factors):  none    Precipitating or alleviating factors: None    Therapies tried and outcome:  rest and fluid      HISTORY    Because he has been ill for about 4 days.  She initially had vomiting which lasted a couple of days and then she had one good day and then she resumed vomiting last evening.  She has nausea.  She has a mild cough.  She has not had fever.  She is not feeling weak or faint.  She does not have a sore throat.    Exposures include working at a  and there is a gastrointestinal illness going around.    She had her tonsils out 2 years ago.    Patient Active Problem List   Diagnosis     Melanocytic nevus of face     Ovarian cyst     Depression with anxiety     Insomnia     Encounter for initial prescription of injectable contraceptive     Morbid obesity (H)     Shoulder pain, right       REVIEW OF SYSTEMS    No fever.  No sore throat.    No pelvic pain.  No urinary difficulty.  No rash.      Past Medical History:   Diagnosis Date     Allergic rhinitis      Allergies     dog     BMI (body mass index), pediatric, 85% to less than 95% for age 1/3/2013     Chronic GERD      Eczema      Melanocytic nevus of face 12/2009     Melanocytic nevus of face        EXAM  /89  Pulse 88  Temp 97.4  F (36.3  C) (Oral)  Resp 18  Wt 268 lb (121.6 kg)  SpO2 100%  BMI 41.97 kg/m2    Sclera clear.  Pharynx without obvious redness or swelling.  Mildly enlarged anterior cervical nodes.  Chest clear.  Abdomen without tenderness or guarding.  Bowel sounds slightly decreased.  Present.  Skin unremarkable.  She is ambulating comfortably.    Results for orders placed or  performed in visit on 12/03/18   Strep, Rapid Screen   Result Value Ref Range    Specimen Description Throat     Rapid Strep A Screen       NEGATIVE: No Group A streptococcal antigen detected by immunoassay, await culture report.       (R11.10,  R19.7) Vomiting and diarrhea  (primary encounter diagnosis)  Comment:     Likely viral gastroenteritis.  Symptomatic treatment indicated.    Plan: Strep, Rapid Screen, Beta strep group A         culture, ondansetron (ZOFRAN ODT) 4 MG ODT tab          Patient advised.  Note for work.  Indications to return for worsening symptoms were also discussed.

## 2018-12-03 NOTE — LETTER
Dec 3, 2018        Meme Mccurdy          Seen today for illness.    May need to be off several days for illness.            Nestor Garcia MD on 12/3/2018 at 8:09 AM

## 2018-12-03 NOTE — MR AVS SNAPSHOT
After Visit Summary   12/3/2018    Meme Mccurdy    MRN: [id number]           Patient Information     Date Of Birth          1995        Visit Information        Provider Department      12/3/2018 7:40 AM Nestor Garcia MD [facility]        Today's Diagnoses     Vomiting and diarrhea    -  1      Care Instructions      Rest.    Clear liquids, gradually advance.    Use nausea medicine as directed.          Follow-ups after your visit        Your next 10 appointments already scheduled     Dec 03, 2018  5:50 PM CST   KATHERINE Extremity with Noel Huddleston PT   [facility] [place] [facility] ([facility])    [address]  [address]  [place] [address] [phone]              Who to contact     If you have questions or need follow up information about today's clinic visit or your schedule please contact [facility] directly at [phone].  Normal or non-critical lab and imaging results will be communicated to you by MyChart, letter or phone within 4 business days after the clinic has received the results. If you do not hear from us within 7 days, please contact the clinic through MyChart or phone. If you have a critical or abnormal lab result, we will notify you by phone as soon as possible.  Submit refill requests through [employer] or call your pharmacy and they will forward the refill request to us. Please allow 3 business days for your refill to be completed.          Additional Information About Your Visit        Care EveryWhere ID     This is your Care EveryWhere ID. This could be used by other organizations to access your [place] medical records  [id number]        Your Vitals Were     Pulse Temperature Respirations Pulse Oximetry BMI (Body Mass Index)       88 97.4  F (36.3  C) (Oral) 18 100% 41.97 kg/m2        Blood Pressure from Last 3 Encounters:   12/03/18 131/89   11/27/18 131/80   11/08/18 126/87    Weight from Last 3  Encounters:   12/03/18 268 lb (121.6 kg)   11/27/18 272 lb (123.4 kg)   11/08/18 273 lb (123.8 kg)              We Performed the Following     Beta strep group A culture     Strep, Rapid Screen          Today's Medication Changes          These changes are accurate as of 12/3/18  8:11 AM.  If you have any questions, ask your nurse or doctor.               Start taking these medicines.        Dose/Directions    ondansetron 4 MG ODT tab   Commonly known as:  ZOFRAN ODT   Used for:  Vomiting and diarrhea   Started by:  Nestor Garcia MD        Dose:  4-8 mg   Take 1-2 tablets (4-8 mg) by mouth every 6 hours as needed for nausea   Quantity:  20 tablet   Refills:  1            Where to get your medicines      These medications were sent to [id number] - [place], [place] - [address]  [address], [place] [address]    Hours:  Same info as [facility] Phone:  [phone]     ondansetron 4 MG ODT tab                Primary Care Provider Office Phone # Fax #    Kristen M Kehr, PA-C [phone] [address] [facility] [address]        Equal Access to Services     ALEKSANDRA SMITH AH: Hadii tatiana ku hadasho Soomaali, waaxda luqadaha, qaybta kaalmada adeegyada, waxay idiin hayeven mary kate roberson. So [facility] [phone].    ATENCIÓN: Si habla español, tiene a prabhakar disposición servicios gratuitos de asistencia lingüística. Llame al [phone].    We comply with applicable federal civil rights laws and [place] laws. We do not discriminate on the basis of race, color, national origin, age, disability, sex, sexual orientation, or gender identity.            Thank you!     Thank you for choosing [facility]  for your care. Our goal is always to provide you with excellent care. Hearing back from our patients is one way we can continue to improve our services. Please take a few minutes to complete the written survey that you may receive in the mail after your visit with  us. Thank you!             Your Updated Medication List - Protect others around you: Learn how to safely use, store and throw away your medicines at www.disposemymeds.org.          This list is accurate as of 12/3/18  8:11 AM.  Always use your most recent med list.                   Brand Name Dispense Instructions for use Diagnosis    ibuprofen 800 MG tablet    ADVIL/MOTRIN    60 tablet    Take 1 tablet by mouth every 8 hours as needed for pain.    Follow-up exam after treatment       medroxyPROGESTERone 150 MG/ML IM injection    DEPO-PROVERA    1 mL    Inject 1 mL (150 mg) into the muscle every 3 months    Surveillance for injectable medroxyprogesterone/estradiol       * nystatin 157086 UNIT/GM external cream    MYCOSTATIN    30 g    Apply topically 3 times daily for 14 days    Intertrigo       * nystatin [id number] UNIT/GM external powder    MYCOSTATIN    60 g    Apply topically 3 times daily as needed    Intertrigo       ondansetron 4 MG ODT tab    ZOFRAN ODT    20 tablet    Take 1-2 tablets (4-8 mg) by mouth every 6 hours as needed for nausea    Vomiting and diarrhea       sertraline 100 MG tablet    ZOLOFT    180 tablet    Take 2 tablets (200 mg) by mouth daily    Depression with anxiety       * Notice:  This list has 2 medication(s) that are the same as other medications prescribed for you. Read the directions carefully, and ask your doctor or other care provider to review them with you.

## 2018-12-04 LAB
BACTERIA SPEC CULT: NORMAL
SPECIMEN SOURCE: NORMAL

## 2018-12-08 NOTE — PROGRESS NOTES
Encouraged to offer frequent feedings. Education given on hunger cues. Reviewed signs of adequate I & O;allow baby to feed ad marin & not to limit time at breast. Discussed ways to awaken baby for feedings including skin to skin. Instructed that baby may also feed 8-12 times a day-cluster feeding at times -as her milk supply is being established. Lactation contact numbers given. Treated. See telephone encounter. Kristen Kehr PA-C  March 13, 2018

## 2019-01-25 DIAGNOSIS — F41.8 DEPRESSION WITH ANXIETY: ICD-10-CM

## 2019-01-25 RX ORDER — SERTRALINE HYDROCHLORIDE 100 MG/1
200 TABLET, FILM COATED ORAL DAILY
Qty: 180 TABLET | Refills: 2 | Status: SHIPPED | OUTPATIENT
Start: 2019-01-25 | End: 2019-11-24

## 2019-01-31 DIAGNOSIS — Z30.42 SURVEILLANCE FOR INJECTABLE MEDROXYPROGESTERONE/ESTRADIOL: Primary | ICD-10-CM

## 2019-02-01 ENCOUNTER — ALLIED HEALTH/NURSE VISIT (OUTPATIENT)
Dept: NURSING | Facility: CLINIC | Age: 24
End: 2019-02-01
Payer: COMMERCIAL

## 2019-02-01 DIAGNOSIS — Z30.42 DEPO-PROVERA CONTRACEPTIVE STATUS: Primary | ICD-10-CM

## 2019-02-01 PROCEDURE — 96372 THER/PROPH/DIAG INJ SC/IM: CPT

## 2019-02-01 RX ORDER — MEDROXYPROGESTERONE ACETATE 150 MG/ML
150 INJECTION, SUSPENSION INTRAMUSCULAR
Status: COMPLETED | OUTPATIENT
Start: 2019-02-01 | End: 2019-12-27

## 2019-02-01 RX ADMIN — MEDROXYPROGESTERONE ACETATE 150 MG: 150 INJECTION, SUSPENSION INTRAMUSCULAR at 08:16

## 2019-02-01 NOTE — PROGRESS NOTES
BP: Data Unavailable    LAST PAP/EXAM: Lab Results       Component                Value               Date                       PAP                      NIL                 03/19/2018            URINE HCG:not indicated    The following medication was given:     MEDICATION: Depo Provera 150mg  ROUTE: IM  SITE: Ventrogluteal - Left  : [employer]  LOT #: [id number]  EXP:07/01/2020  NDC:00174-543-45  NEXT INJECTION DUE: 4/19/19 - 5/3/19   Provider: Kehr, Kristen.  Pt was given a reminder card and was advised to wait 10-20 mins after vaccine was given. Guillermo Li MA

## 2019-04-26 ENCOUNTER — ALLIED HEALTH/NURSE VISIT (OUTPATIENT)
Dept: NURSING | Facility: CLINIC | Age: 24
End: 2019-04-26
Payer: COMMERCIAL

## 2019-04-26 VITALS
BODY MASS INDEX: 40.41 KG/M2 | SYSTOLIC BLOOD PRESSURE: 134 MMHG | DIASTOLIC BLOOD PRESSURE: 87 MMHG | WEIGHT: 258 LBS | HEART RATE: 99 BPM

## 2019-04-26 DIAGNOSIS — Z30.42 SURVEILLANCE FOR INJECTABLE MEDROXYPROGESTERONE/ESTRADIOL: Primary | ICD-10-CM

## 2019-04-26 PROCEDURE — 96372 THER/PROPH/DIAG INJ SC/IM: CPT

## 2019-04-26 PROCEDURE — 99207 ZZC NO CHARGE NURSE ONLY: CPT

## 2019-04-26 NOTE — PROGRESS NOTES
Prior to injection, verified patient identity using patient's name and date of birth.  Due to injection administration, patient instructed to remain in clinic for 15 minutes  afterwards, and to report any adverse reaction to me immediately.    BP: 134/87    LAST PAP/EXAM:   Lab Results   Component Value Date    PAP NIL 03/19/2018     URINE HCG:not indicated    NEXT INJECTION DUE: 7/12/19 - 7/26/19       Drug Amount Wasted:  None.  Vial/Syringe: Single dose vial  Expiration Date:  7/2020

## 2019-07-13 PROBLEM — M25.511 SHOULDER PAIN, RIGHT: Status: RESOLVED | Noted: 2018-10-23 | Resolved: 2019-07-13

## 2019-07-15 ENCOUNTER — ALLIED HEALTH/NURSE VISIT (OUTPATIENT)
Dept: NURSING | Facility: CLINIC | Age: 24
End: 2019-07-15
Payer: COMMERCIAL

## 2019-07-15 DIAGNOSIS — Z30.42 SURVEILLANCE FOR DEPO-PROVERA CONTRACEPTION: Primary | ICD-10-CM

## 2019-07-15 PROCEDURE — 99207 ZZC NO CHARGE LOS: CPT

## 2019-07-15 PROCEDURE — 96372 THER/PROPH/DIAG INJ SC/IM: CPT

## 2019-07-15 RX ADMIN — MEDROXYPROGESTERONE ACETATE 150 MG: 150 INJECTION, SUSPENSION INTRAMUSCULAR at 14:20

## 2019-07-15 NOTE — PROGRESS NOTES
BP: Data Unavailable    LAST PAP/EXAM:   Lab Results   Component Value Date    PAP NIL 03/19/2018     URINE HCG:not indicated    The following medication was given:     MEDICATION: Depo Provera 150mg  ROUTE: IM  SITE: [facility]  : mylan  LOT #: [id number]  EXP:01/2021  NEXT INJECTION DUE: 9/30/19 - 10/14/19   Provider: Kehr, Kristen E.Carpenter,CMA

## 2019-08-01 ENCOUNTER — OFFICE VISIT (OUTPATIENT)
Dept: ORTHOPEDICS | Facility: CLINIC | Age: 24
End: 2019-08-01
Payer: COMMERCIAL

## 2019-08-01 VITALS — BODY MASS INDEX: 39.81 KG/M2 | SYSTOLIC BLOOD PRESSURE: 110 MMHG | WEIGHT: 254.2 LBS | DIASTOLIC BLOOD PRESSURE: 70 MMHG

## 2019-08-01 DIAGNOSIS — M54.16 ACUTE RIGHT LUMBAR RADICULOPATHY: Primary | ICD-10-CM

## 2019-08-01 PROCEDURE — 99204 OFFICE O/P NEW MOD 45 MIN: CPT | Performed by: FAMILY MEDICINE

## 2019-08-01 RX ORDER — NAPROXEN 500 MG/1
500 TABLET ORAL 2 TIMES DAILY WITH MEALS
Qty: 30 TABLET | Refills: 1 | Status: SHIPPED | OUTPATIENT
Start: 2019-08-01 | End: 2022-07-29

## 2019-08-01 RX ORDER — CYCLOBENZAPRINE HCL 10 MG
10 TABLET ORAL
Qty: 30 TABLET | Refills: 0 | Status: SHIPPED | OUTPATIENT
Start: 2019-08-01 | End: 2022-07-29

## 2019-08-01 RX ORDER — METHYLPREDNISOLONE 4 MG
TABLET, DOSE PACK ORAL
Qty: 21 TABLET | Refills: 0 | Status: SHIPPED | OUTPATIENT
Start: 2019-08-01 | End: 2019-12-11

## 2019-08-01 NOTE — LETTER
8/1/2019         RE: Meme Mccurdy  [address] [facility] [id number]        Dear Colleague,    Thank you for referring your patient, Meme Mccurdy, to the [facility] [place]. Please see a copy of my visit note below.    ASSESSMENT & PLAN  Meme was seen today for pain.    Diagnoses and all orders for this visit:    Acute right lumbar radiculopathy  -     naproxen (NAPROSYN) 500 MG tablet; Take 1 tablet (500 mg) by mouth 2 times daily (with meals)  -     cyclobenzaprine (FLEXERIL) 10 MG tablet; Take 1 tablet (10 mg) by mouth nightly as needed for muscle spasms  -     methylPREDNISolone (MEDROL DOSEPAK) 4 MG tablet therapy pack; Follow Package Directions      Patient is a 24 year old female presenting for evaluation of   Chief Complaint   Patient presents with     Lower Back - Pain      Acute Right Lumbar Radiculopathy: Notable after sig coughing episode 1 day ago with bilateral paralumbar pain on palpation and pos SLR/slump on exam.  Pt has had this before not as severe as current episode.  Given this plan to tx as below, f/u PRN  Treatment: relative rest, no softball until pain free  Physical Therapy HEP  Injection none  Medications Medrol dose pack, Naproxen after as well as flexeril at night  Concerning signs/sx that would warrant urgent evaluation were discussed.  All questions were answered, patient understands and agrees with plan.      Return if symptoms worsen or fail to improve.    -----    SUBJECTIVE  Meme Mccurdy is a/an 24 year old female who is seen as a self referral AIC for evaluation of low back pain. The patient is seen by themselves.    Onset: 7/31/19, 1 day(s) ago. Patient describes injury as coughing and felt a sharp pain in lower back.  Pt fell 2/2 pain was significant.  Pt reporting right sided radicular sx.  Pt has had this before one yr ago not as severe.  Pt is hoping to find cause of pain.  Pt not working, does play softball.    Location of Pain:  midline low back  Rating of Pain at worst: 10/10  Rating of Pain Currently: 8/10  Worsened by: any movement, prolonged walking    Better with: change in position   Treatments tried: oxycodone (prescribed for previous tonsil surgery), no other meds  Quality: aching, sharp, shooting pricking  Red flags: Weakness: No, bowel/bladder loss: No, foot drop: No  Associated symptoms: numbness and tingling radiating to bilateral lower leg   Orthopedic history: YES - injury 1 year ago, managed with oxycodone   Relevant surgical history: NO  Past Medical History:   Diagnosis Date     Allergic rhinitis      Allergies     dog     BMI (body mass index), pediatric, 85% to less than 95% for age 1/3/2013     Chronic GERD      Eczema      Melanocytic nevus of face 12/2009     Melanocytic nevus of face      Social History     Socioeconomic History     Marital status: Single     Spouse name: Not on file     Number of children: Not on file     Years of education: Not on file     Highest education level: Not on file   Occupational History     Not on file   Social Needs     Financial resource strain: Not on file     Food insecurity:     Worry: Not on file     Inability: Not on file     Transportation needs:     Medical: Not on file     Non-medical: Not on file   Tobacco Use     Smoking status: Never Smoker     Smokeless tobacco: Never Used   Substance and Sexual Activity     Alcohol use: No     Drug use: No     Sexual activity: Yes     Birth control/protection: Condom   Lifestyle     Physical activity:     Days per week: Not on file     Minutes per session: Not on file     Stress: Not on file   Relationships     Social connections:     Talks on phone: Not on file     Gets together: Not on file     Attends Religion service: Not on file     Active member of club or organization: Not on file     Attends meetings of clubs or organizations: Not on file     Relationship status: Not on file     Intimate partner violence:     Fear of current or ex  partner: Not on file     Emotionally abused: Not on file     Physically abused: Not on file     Forced sexual activity: Not on file   Other Topics Concern     Parent/sibling w/ CABG, MI or angioplasty before 65F 55M? Not Asked   Social History Narrative     Not on file         Patient's past medical, surgical, social, and family histories were reviewed today and no changes are noted.    REVIEW OF SYSTEMS:  10 point ROS is negative other than symptoms noted above in HPI, Past Medical History or as stated below  Constitutional: NEGATIVE for fever, chills, change in weight  Skin: NEGATIVE for worrisome rashes, moles or lesions  GI/: NEGATIVE for bowel or bladder changes  Neuro: NEGATIVE for weakness, dizziness or paresthesias    OBJECTIVE:  /70   Wt 115.3 kg (254 lb 3.2 oz)   BMI 39.81 kg/m      General: healthy, alert and in no distress  HEENT: no scleral icterus or conjunctival erythema  Skin: no suspicious lesions or rash. No jaundice.  CV: no pedal edema  Resp: normal respiratory effort without conversational dyspnea   Psych: normal mood and affect  Gait: normal steady gait with appropriate coordination and balance  Neuro: normal light touch sensory exam of the bilateral lower extremities.  DTR's 2+ patella and achilles bilaterally.  MSK:  THORACIC/LUMBAR SPINE  Inspection:    No gross deformity/asymmetry  Palpation:    Tender about the left para lumbar muscles and right para lumbar muscles. Otherwise remainder of landmarks are nontender.  Range of Motion:     Lumbar flexion limited substantially by pain    Lumbar extension limited slightly by pain  Strength:    5/5 - quadriceps, hamstrings, tibialis anterior, gastrocsoleus, and extensor hallicus longus  Special Tests:    Positive: straight leg raise (right), slump test (right)    Negative: straight leg raise (left), slump test (left), AMANDA (bilateral), FADIR (bilateral)    BILATERAL HIP  Inspection:    No obvious deformity or asymmetry, pelvis  level  Palpation:    Nontender.  Active Range of Motion:     Flexion full, IR full, ER  full  Strength:    Flexion 5/5, adduction 5/5, abduction 5/5  Special Tests:    Positive: None    Negative: Logroll, AMANDA, anterior impingement (FADIR)    Independent visualization of the below image:  No results found for this or any previous visit (from the past 24 hour(s)).      Patient's conditions were thoroughly discussed during today's visit with greater than 50% of the visit spent counseling the patient with total time spent face-to-face with the patient being 30 minutes.    Garrett Neely MD [facility]      Again, thank you for allowing me to participate in the care of your patient.        Sincerely,        Garrett Neely MD

## 2019-08-01 NOTE — PATIENT INSTRUCTIONS
Diagnosis: Lumbar radiculopathy (Likely irritated nerve from a disc herniation)  Image Findings: None  Treatment: Relative rest, home exercises  Medications: Naproxen twice daily with food for pain for 1-2 weeks then only as needed do not take with ibuprofen, Medrol dosepak take as prescribed, Flexeril at night for pain  Follow-up: As needed if not improved over the next month or two    It was great seeing you today!    Garrett Neely    Patient Education     Understanding Lumbar Radiculopathy    Lumbar radiculopathy is irritation or inflammation of a nerve root in the low back. It causes symptoms that spread out from the back down one or both legs. To understand this condition, it helps to understand the parts of the spine:    Vertebrae. These are bones that stack to form the spine. The lumbar spine contains the 5 bottom vertebrae.    Disks. These are soft pads of tissue between the vertebrae. They act as shock absorbers for the spine.    Spinal canal. This is a tunnel formed within the stacked vertebrae. In the lumbar spine, nerves run through this canal.    Nerves. These branch off and leave the spinal canal, traveling out to parts of the body. As they leave the spinal canal, nerves pass through openings between the vertebrae. The nerve root is the part of the nerve that is closest to the spinal canal.    Sciatic nerve. This is a large nerve formed from several nerve roots in the low back. This nerve extends down the back of the leg to the foot.  With lumbar radiculopathy, nerve roots in the low back become irritated. This leads to pain and symptoms. The sciatic nerve is commonly involved, so the condition is often called sciatica.  What causes lumbar radiculopathy?  Aging, injury, poor posture, extra body weight, and other issues can lead to problems in the low back. These problems may then irritate nerve roots. They include:    Damage to a disk in the lumbar spine. The damaged disk may then press on nearby  nerve roots.    Degeneration from wear and tear, and aging. This can lead to narrowing (stenosis) of the openings between the vertebrae. The narrowed openings press on nerve roots as they leave the spinal canal.    Unstable spine. This is when a vertebra slips forward. It can then press on a nerve root.  Other, less common things can put pressure on nerves in the low back. These include diabetes, infection, or a tumor.  Symptoms of lumbar radiculopathy  These include:    Pain in the low back    Pain, numbness, tingling, or weakness that travels into the buttocks, hip, groin, or leg    Muscle spasms  Treatment for lumbar radiculopathy  In most cases, your healthcare provider will first try treatments that help relieve symptoms. These may include:    Prescription and over-the-counter pain medicines. These help relieve pain, swelling, and irritation.    Limits on positions and activities that increase pain. But lying in bed or avoiding all movement is only recommended for a short period of time.    Physical therapy, including exercises and stretches. This helps decrease pain and increase movement and function.    Steroid shots into the lower back. This may help relieve symptoms for a time.    Weight-loss program. If you are overweight, losing extra pounds may help relieve symptoms.  In some cases, you may need surgery to fix the underlying problem. This depends on the cause, the symptoms, and how long the pain has lasted.  Possible complications  Over time, an irritated and inflamed nerve may become damaged. This may lead to long-lasting (permanent) numbness or weakness in your legs and feet. If symptoms change suddenly or get worse, be sure to let your healthcare provider know.  When to call your healthcare provider  Call your healthcare provider right away if you have any of these:    New pain or pain that gets worse    New or increasing weakness, tingling, or numbness in your leg or foot    Problems controlling your  bladder or bowel   Date Last Reviewed: 3/10/2016    3921-1196 [employer]. [address] [place] [address], [place] [address]. All rights reserved. This information is not intended as a substitute for professional medical care. Always follow your healthcare professional's instructions.

## 2019-08-01 NOTE — PROGRESS NOTES
ASSESSMENT & PLAN  Meme was seen today for pain.    Diagnoses and all orders for this visit:    Acute right lumbar radiculopathy  -     naproxen (NAPROSYN) 500 MG tablet; Take 1 tablet (500 mg) by mouth 2 times daily (with meals)  -     cyclobenzaprine (FLEXERIL) 10 MG tablet; Take 1 tablet (10 mg) by mouth nightly as needed for muscle spasms  -     methylPREDNISolone (MEDROL DOSEPAK) 4 MG tablet therapy pack; Follow Package Directions      Patient is a 24 year old female presenting for evaluation of   Chief Complaint   Patient presents with     Lower Back - Pain      Acute Right Lumbar Radiculopathy: Notable after sig coughing episode 1 day ago with bilateral paralumbar pain on palpation and pos SLR/slump on exam.  Pt has had this before not as severe as current episode.  Given this plan to tx as below, f/u PRN  Treatment: relative rest, no softball until pain free  Physical Therapy HEP  Injection none  Medications Medrol dose pack, Naproxen after as well as flexeril at night  Concerning signs/sx that would warrant urgent evaluation were discussed.  All questions were answered, patient understands and agrees with plan.      Return if symptoms worsen or fail to improve.    -----    SUBJECTIVE  Meme Mccurdy is a/an 24 year old female who is seen as a self referral AIC for evaluation of low back pain. The patient is seen by themselves.    Onset: 7/31/19, 1 day(s) ago. Patient describes injury as coughing and felt a sharp pain in lower back.  Pt fell 2/2 pain was significant.  Pt reporting right sided radicular sx.  Pt has had this before one yr ago not as severe.  Pt is hoping to find cause of pain.  Pt not working, does play softball.    Location of Pain: midline low back  Rating of Pain at worst: 10/10  Rating of Pain Currently: 8/10  Worsened by: any movement, prolonged walking    Better with: change in position   Treatments tried: oxycodone (prescribed for previous tonsil surgery), no other meds  Quality:  aching, sharp, shooting pricking  Red flags: Weakness: No, bowel/bladder loss: No, foot drop: No  Associated symptoms: numbness and tingling radiating to bilateral lower leg   Orthopedic history: YES - injury 1 year ago, managed with oxycodone   Relevant surgical history: NO  Past Medical History:   Diagnosis Date     Allergic rhinitis      Allergies     dog     BMI (body mass index), pediatric, 85% to less than 95% for age 1/3/2013     Chronic GERD      Eczema      Melanocytic nevus of face 12/2009     Melanocytic nevus of face      Social History     Socioeconomic History     Marital status: Single     Spouse name: Not on file     Number of children: Not on file     Years of education: Not on file     Highest education level: Not on file   Occupational History     Not on file   Social Needs     Financial resource strain: Not on file     Food insecurity:     Worry: Not on file     Inability: Not on file     Transportation needs:     Medical: Not on file     Non-medical: Not on file   Tobacco Use     Smoking status: Never Smoker     Smokeless tobacco: Never Used   Substance and Sexual Activity     Alcohol use: No     Drug use: No     Sexual activity: Yes     Birth control/protection: Condom   Lifestyle     Physical activity:     Days per week: Not on file     Minutes per session: Not on file     Stress: Not on file   Relationships     Social connections:     Talks on phone: Not on file     Gets together: Not on file     Attends Shinto service: Not on file     Active member of club or organization: Not on file     Attends meetings of clubs or organizations: Not on file     Relationship status: Not on file     Intimate partner violence:     Fear of current or ex partner: Not on file     Emotionally abused: Not on file     Physically abused: Not on file     Forced sexual activity: Not on file   Other Topics Concern     Parent/sibling w/ CABG, MI or angioplasty before 65F 55M? Not Asked   Social History Narrative      Not on file         Patient's past medical, surgical, social, and family histories were reviewed today and no changes are noted.    REVIEW OF SYSTEMS:  10 point ROS is negative other than symptoms noted above in HPI, Past Medical History or as stated below  Constitutional: NEGATIVE for fever, chills, change in weight  Skin: NEGATIVE for worrisome rashes, moles or lesions  GI/: NEGATIVE for bowel or bladder changes  Neuro: NEGATIVE for weakness, dizziness or paresthesias    OBJECTIVE:  /70   Wt 115.3 kg (254 lb 3.2 oz)   BMI 39.81 kg/m     General: healthy, alert and in no distress  HEENT: no scleral icterus or conjunctival erythema  Skin: no suspicious lesions or rash. No jaundice.  CV: no pedal edema  Resp: normal respiratory effort without conversational dyspnea   Psych: normal mood and affect  Gait: normal steady gait with appropriate coordination and balance  Neuro: normal light touch sensory exam of the bilateral lower extremities.  DTR's 2+ patella and achilles bilaterally.  MSK:  THORACIC/LUMBAR SPINE  Inspection:    No gross deformity/asymmetry  Palpation:    Tender about the left para lumbar muscles and right para lumbar muscles. Otherwise remainder of landmarks are nontender.  Range of Motion:     Lumbar flexion limited substantially by pain    Lumbar extension limited slightly by pain  Strength:    5/5 - quadriceps, hamstrings, tibialis anterior, gastrocsoleus, and extensor hallicus longus  Special Tests:    Positive: straight leg raise (right), slump test (right)    Negative: straight leg raise (left), slump test (left), AMANDA (bilateral), FADIR (bilateral)    BILATERAL HIP  Inspection:    No obvious deformity or asymmetry, pelvis level  Palpation:    Nontender.  Active Range of Motion:     Flexion full, IR full, ER  full  Strength:    Flexion 5/5, adduction 5/5, abduction 5/5  Special Tests:    Positive: None    Negative: Logroll, AMANDA, anterior impingement (FADIR)    Independent  visualization of the below image:  No results found for this or any previous visit (from the past 24 hour(s)).      Patient's conditions were thoroughly discussed during today's visit with greater than 50% of the visit spent counseling the patient with total time spent face-to-face with the patient being 30 minutes.    Garrett Neely MD [facility]

## 2019-08-13 ENCOUNTER — OFFICE VISIT (OUTPATIENT)
Dept: OBGYN | Facility: CLINIC | Age: 24
End: 2019-08-13
Payer: COMMERCIAL

## 2019-08-13 VITALS
DIASTOLIC BLOOD PRESSURE: 84 MMHG | BODY MASS INDEX: 39.69 KG/M2 | HEART RATE: 118 BPM | WEIGHT: 253.4 LBS | SYSTOLIC BLOOD PRESSURE: 127 MMHG | OXYGEN SATURATION: 96 %

## 2019-08-13 DIAGNOSIS — Z11.3 SCREEN FOR STD (SEXUALLY TRANSMITTED DISEASE): Primary | ICD-10-CM

## 2019-08-13 DIAGNOSIS — A74.9 CHLAMYDIA INFECTION: ICD-10-CM

## 2019-08-13 PROCEDURE — 87491 CHLMYD TRACH DNA AMP PROBE: CPT | Performed by: NURSE PRACTITIONER

## 2019-08-13 PROCEDURE — 86803 HEPATITIS C AB TEST: CPT | Performed by: NURSE PRACTITIONER

## 2019-08-13 PROCEDURE — 36415 COLL VENOUS BLD VENIPUNCTURE: CPT | Performed by: NURSE PRACTITIONER

## 2019-08-13 PROCEDURE — 86780 TREPONEMA PALLIDUM: CPT | Performed by: NURSE PRACTITIONER

## 2019-08-13 PROCEDURE — 87591 N.GONORRHOEAE DNA AMP PROB: CPT | Performed by: NURSE PRACTITIONER

## 2019-08-13 PROCEDURE — 87389 HIV-1 AG W/HIV-1&-2 AB AG IA: CPT | Performed by: NURSE PRACTITIONER

## 2019-08-13 PROCEDURE — 87340 HEPATITIS B SURFACE AG IA: CPT | Performed by: NURSE PRACTITIONER

## 2019-08-13 PROCEDURE — 99202 OFFICE O/P NEW SF 15 MIN: CPT | Performed by: NURSE PRACTITIONER

## 2019-08-13 NOTE — PROGRESS NOTES
Subjective     Meme Mccurdy is a 24 year old female who presents to clinic today for the following health issues:    HPI   STI screening  No known exposure or specific concerns. Has a new partner and wants screening. Denies abnormal vaginal discharge, odor, itching. Denies urinary symptoms, pelvic pain, fever, nausea. Does use condoms most of the time. Declines full preventative exam. Pap smear up to date. Depo Provera for contraception.    Patient Active Problem List   Diagnosis     Melanocytic nevus of face     Ovarian cyst     Depression with anxiety     Insomnia     Encounter for initial prescription of injectable contraceptive     Morbid obesity (H)     Past Surgical History:   Procedure Laterality Date     HC TOOTH EXTRACTION W/FORCEP  9/08     TONSILLECTOMY Bilateral 12/19/2016    Procedure: TONSILLECTOMY;  Surgeon: Ilya Montilla MD;  Location:  OR       Social History     Tobacco Use     Smoking status: Never Smoker     Smokeless tobacco: Never Used   Substance Use Topics     Alcohol use: No     Family History   Problem Relation Age of Onset     Hypertension Mother      Arthritis Mother      Lipids Father         High Cholesterol     Breast Cancer Maternal Grandmother      Cancer Maternal Grandmother      Diabetes Maternal Grandfather      Hypertension Maternal Grandfather      Cerebrovascular Disease Other      Thyroid Disease No family hx of      Glaucoma No family hx of      Macular Degeneration No family hx of          Reviewed and updated as needed this visit by Provider  Tobacco  Allergies  Meds  Problems  Med Hx  Surg Hx  Fam Hx  Soc Hx          Review of Systems   ROS COMP: Constitutional, HEENT, cardiovascular, pulmonary, gi and gu systems are negative, except as otherwise noted.      Objective    /84   Pulse 118   Wt 114.9 kg (253 lb 6.4 oz)   SpO2 96%   BMI 39.69 kg/m    Body mass index is 39.69 kg/m .  Physical Exam   GENERAL: healthy, alert and no distress  ABDOMEN:  soft, nontender, no hepatosplenomegaly, no masses and bowel sounds normal  MS: no gross musculoskeletal defects noted, no edema  SKIN: no suspicious lesions or rashes  PSYCH: mentation appears normal, affect normal/bright    Assessment & Plan     1. Screen for STD (sexually transmitted disease)  Will follow up with patient when results available. Safe sexual practices encouraged including consistent condom use.  - Chlamydia trachomatis PCR  - Neisseria gonorrhoeae PCR  - Hepatitis C antibody  - Hepatitis B surface antigen  - Treponema Abs w Reflex to RPR and Titer  - HIV Antigen Antibody Combo     IGNACIO Barton CNP  [facility]

## 2019-08-14 LAB
C TRACH DNA SPEC QL NAA+PROBE: POSITIVE
HBV SURFACE AG SERPL QL IA: NONREACTIVE
HCV AB SERPL QL IA: NONREACTIVE
HIV 1+2 AB+HIV1 P24 AG SERPL QL IA: NONREACTIVE
N GONORRHOEA DNA SPEC QL NAA+PROBE: NEGATIVE
SPECIMEN SOURCE: ABNORMAL
SPECIMEN SOURCE: NORMAL
T PALLIDUM AB SER QL: NONREACTIVE

## 2019-08-14 RX ORDER — AZITHROMYCIN 500 MG/1
1000 TABLET, FILM COATED ORAL DAILY
Qty: 2 TABLET | Refills: 0 | Status: SHIPPED | OUTPATIENT
Start: 2019-08-14 | End: 2019-12-11

## 2019-10-02 ENCOUNTER — ALLIED HEALTH/NURSE VISIT (OUTPATIENT)
Dept: NURSING | Facility: CLINIC | Age: 24
End: 2019-10-02
Payer: COMMERCIAL

## 2019-10-02 VITALS
BODY MASS INDEX: 39.16 KG/M2 | WEIGHT: 250 LBS | SYSTOLIC BLOOD PRESSURE: 142 MMHG | HEART RATE: 76 BPM | DIASTOLIC BLOOD PRESSURE: 86 MMHG | OXYGEN SATURATION: 95 %

## 2019-10-02 DIAGNOSIS — Z30.42 SURVEILLANCE FOR INJECTABLE MEDROXYPROGESTERONE/ESTRADIOL: Primary | ICD-10-CM

## 2019-10-02 PROCEDURE — 96372 THER/PROPH/DIAG INJ SC/IM: CPT

## 2019-10-02 RX ADMIN — MEDROXYPROGESTERONE ACETATE 150 MG: 150 INJECTION, SUSPENSION INTRAMUSCULAR at 12:00

## 2019-10-02 NOTE — PROGRESS NOTES
Clinic Administered Medication Documentation    MEDICATION LIST:   Depo Provera Documentation    Prior to injection, verified patient identity using patient's name and date of birth. Medication was administered. Please see MAR and medication order for additional information. Patient instructed to remain in clinic for 15 minutes.    BP: 142/86    LAST PAP/EXAM:   Lab Results   Component Value Date    PAP NIL 03/19/2018     URINE HCG:not indicated    NEXT INJECTION DUE: 12/18/19 - 1/1/20    Was entire vial of medication used? Yes  Vial/Syringe: Single dose vial  Expiration Date:  1/2021  Shereen Naqvi MA

## 2019-11-24 DIAGNOSIS — F41.8 DEPRESSION WITH ANXIETY: ICD-10-CM

## 2019-11-25 RX ORDER — SERTRALINE HYDROCHLORIDE 100 MG/1
200 TABLET, FILM COATED ORAL DAILY
Qty: 60 TABLET | Refills: 0 | Status: SHIPPED | OUTPATIENT
Start: 2019-11-25 | End: 2019-12-11

## 2019-11-25 ASSESSMENT — PATIENT HEALTH QUESTIONNAIRE - PHQ9: SUM OF ALL RESPONSES TO PHQ QUESTIONS 1-9: 7

## 2019-11-26 NOTE — TELEPHONE ENCOUNTER
PHQ-9 SCORE 9/11/2017 3/12/2018 11/27/2018   PHQ-9 Total Score - - -   PHQ-9 Total Score 10 11 17     Patient is due for an office visit.   Next 5 appointments (look out 90 days)    Dec 11, 2019  1:50 PM CST  SHORT with Kristen M Kehr, PA-C  [facility] ([facility]) [address] [facility] [id number] [phone]        PHQ-9 SCORE 3/12/2018 11/27/2018 11/25/2019   PHQ-9 Total Score - - -   PHQ-9 Total Score 11 17 7       Patient/parent verbalized understanding of instructions provided and agreed with the plan of care  Kristi Shell RN

## 2019-12-11 ENCOUNTER — OFFICE VISIT (OUTPATIENT)
Dept: FAMILY MEDICINE | Facility: CLINIC | Age: 24
End: 2019-12-11
Payer: COMMERCIAL

## 2019-12-11 VITALS
HEIGHT: 67 IN | WEIGHT: 260 LBS | OXYGEN SATURATION: 97 % | BODY MASS INDEX: 40.81 KG/M2 | DIASTOLIC BLOOD PRESSURE: 80 MMHG | HEART RATE: 89 BPM | TEMPERATURE: 98.4 F | SYSTOLIC BLOOD PRESSURE: 116 MMHG

## 2019-12-11 DIAGNOSIS — F41.8 DEPRESSION WITH ANXIETY: ICD-10-CM

## 2019-12-11 PROCEDURE — 99213 OFFICE O/P EST LOW 20 MIN: CPT | Performed by: PHYSICIAN ASSISTANT

## 2019-12-11 RX ORDER — SERTRALINE HYDROCHLORIDE 100 MG/1
200 TABLET, FILM COATED ORAL DAILY
Qty: 180 TABLET | Refills: 1 | Status: SHIPPED | OUTPATIENT
Start: 2019-12-11 | End: 2020-06-24

## 2019-12-11 ASSESSMENT — ANXIETY QUESTIONNAIRES
4. TROUBLE RELAXING: SEVERAL DAYS
6. BECOMING EASILY ANNOYED OR IRRITABLE: MORE THAN HALF THE DAYS
7. FEELING AFRAID AS IF SOMETHING AWFUL MIGHT HAPPEN: NOT AT ALL
GAD7 TOTAL SCORE: 9
3. WORRYING TOO MUCH ABOUT DIFFERENT THINGS: SEVERAL DAYS
7. FEELING AFRAID AS IF SOMETHING AWFUL MIGHT HAPPEN: NOT AT ALL
2. NOT BEING ABLE TO STOP OR CONTROL WORRYING: NEARLY EVERY DAY
5. BEING SO RESTLESS THAT IT IS HARD TO SIT STILL: NOT AT ALL
GAD7 TOTAL SCORE: 9
1. FEELING NERVOUS, ANXIOUS, OR ON EDGE: MORE THAN HALF THE DAYS
GAD7 TOTAL SCORE: 9

## 2019-12-11 ASSESSMENT — PATIENT HEALTH QUESTIONNAIRE - PHQ9
SUM OF ALL RESPONSES TO PHQ QUESTIONS 1-9: 9
10. IF YOU CHECKED OFF ANY PROBLEMS, HOW DIFFICULT HAVE THESE PROBLEMS MADE IT FOR YOU TO DO YOUR WORK, TAKE CARE OF THINGS AT HOME, OR GET ALONG WITH OTHER PEOPLE: VERY DIFFICULT
SUM OF ALL RESPONSES TO PHQ QUESTIONS 1-9: 9

## 2019-12-11 ASSESSMENT — MIFFLIN-ST. JEOR: SCORE: 1964.04

## 2019-12-11 ASSESSMENT — PAIN SCALES - GENERAL: PAINLEVEL: NO PAIN (0)

## 2019-12-11 NOTE — NURSING NOTE
"Chief Complaint   Patient presents with     Depression     Anxiety       Initial /80   Pulse 89   Temp 98.4  F (36.9  C) (Oral)   Ht 1.689 m (5' 6.5\")   Wt 117.9 kg (260 lb)   SpO2 97%   BMI 41.34 kg/m   Estimated body mass index is 41.34 kg/m  as calculated from the following:    Height as of this encounter: 1.689 m (5' 6.5\").    Weight as of this encounter: 117.9 kg (260 lb).  Medication Reconciliation: complete    JYOTI Marquez MA    "

## 2019-12-11 NOTE — PROGRESS NOTES
Subjective     Meme Mccurdy is a 24 year old female who presents to clinic today for the following health issues:    History of Present Illness        Mental Health Follow-up:  Patient presents to follow-up on Depression & Anxiety.Patient's depression since last visit has been:  Medium  The patient is having other symptoms associated with depression.  Patient's anxiety since last visit has been:  Bad  The patient is having other symptoms associated with anxiety.  Any significant life events: job concerns, financial concerns and other  Patient is feeling anxious or having panic attacks.  Patient has no concerns about alcohol or drug use.     Social History  Tobacco Use    Smoking status: Never Smoker    Smokeless tobacco: Never Used  Alcohol use: No  Drug use: No      Today's PHQ-9         PHQ-9 Total Score:     (P) 9   PHQ-9 Q9 Thoughts of better off dead/self-harm past 2 weeks :   (P) Not at all   Thoughts of suicide or self harm:      Self-harm Plan:        Self-harm Action:          Safety concerns for self or others:              Patient Active Problem List   Diagnosis     Melanocytic nevus of face     Ovarian cyst     Depression with anxiety     Insomnia     Encounter for initial prescription of injectable contraceptive     Morbid obesity (H)     Past Surgical History:   Procedure Laterality Date     HC TOOTH EXTRACTION W/FORCEP  9/08     TONSILLECTOMY Bilateral 12/19/2016    Procedure: TONSILLECTOMY;  Surgeon: Ilya Motnilla MD;  Location:  OR       Social History     Tobacco Use     Smoking status: Never Smoker     Smokeless tobacco: Never Used   Substance Use Topics     Alcohol use: No     Family History   Problem Relation Age of Onset     Hypertension Mother      Arthritis Mother      Lipids Father         High Cholesterol     Breast Cancer Maternal Grandmother      Cancer Maternal Grandmother      Diabetes Maternal Grandfather      Hypertension Maternal Grandfather      Cerebrovascular Disease  "Other      Thyroid Disease No family hx of      Glaucoma No family hx of      Macular Degeneration No family hx of          Current Outpatient Medications   Medication Sig Dispense Refill     cyclobenzaprine (FLEXERIL) 10 MG tablet Take 1 tablet (10 mg) by mouth nightly as needed for muscle spasms 30 tablet 0     ibuprofen (ADVIL,MOTRIN) 800 MG tablet Take 1 tablet by mouth every 8 hours as needed for pain. 60 tablet 1     naproxen (NAPROSYN) 500 MG tablet Take 1 tablet (500 mg) by mouth 2 times daily (with meals) 30 tablet 1     nystatin (MYCOSTATIN) 058543 UNIT/GM POWD Apply topically 3 times daily as needed 60 g 1     sertraline (ZOLOFT) 100 MG tablet Take 2 tablets (200 mg) by mouth daily 180 tablet 1     Allergies   Allergen Reactions     Nkda [No Known Drug Allergies]      BP Readings from Last 3 Encounters:   12/11/19 116/80   10/02/19 (!) 142/86   08/13/19 127/84    Wt Readings from Last 3 Encounters:   12/11/19 117.9 kg (260 lb)   10/02/19 113.4 kg (250 lb)   08/13/19 114.9 kg (253 lb 6.4 oz)                    Reviewed and updated as needed this visit by Provider         Review of Systems   ROS COMP: Constitutional, HEENT, cardiovascular, pulmonary, GI, , musculoskeletal, neuro, skin, endocrine and psych systems are negative, except as otherwise noted.      Objective    /80   Pulse 89   Temp 98.4  F (36.9  C) (Oral)   Ht 1.689 m (5' 6.5\")   Wt 117.9 kg (260 lb)   SpO2 97%   BMI 41.34 kg/m    Body mass index is 41.34 kg/m .  Physical Exam   GENERAL: healthy, alert and no distress  PSYCH: mentation appears normal, affect normal/bright, judgement and insight intact and appearance well groomed    Diagnostic Test Results:  Labs reviewed in Epic        Assessment & Plan     1. Depression with anxiety  Stable   Continue current medication.   Plan follow up via Evisit or office visit in 6 months.   - sertraline (ZOLOFT) 100 MG tablet; Take 2 tablets (200 mg) by mouth daily  Dispense: 180 tablet; " Refill: 1     Return in about 6 months (around 6/11/2020) for medication check, Evisit or office visit.    Kristen M. Kehr, PA-C  [facility]

## 2019-12-12 ASSESSMENT — ANXIETY QUESTIONNAIRES: GAD7 TOTAL SCORE: 9

## 2019-12-12 ASSESSMENT — PATIENT HEALTH QUESTIONNAIRE - PHQ9: SUM OF ALL RESPONSES TO PHQ QUESTIONS 1-9: 9

## 2019-12-27 ENCOUNTER — ALLIED HEALTH/NURSE VISIT (OUTPATIENT)
Dept: NURSING | Facility: CLINIC | Age: 24
End: 2019-12-27
Payer: COMMERCIAL

## 2019-12-27 VITALS
WEIGHT: 260 LBS | DIASTOLIC BLOOD PRESSURE: 78 MMHG | OXYGEN SATURATION: 96 % | BODY MASS INDEX: 41.34 KG/M2 | HEART RATE: 93 BPM | SYSTOLIC BLOOD PRESSURE: 137 MMHG

## 2019-12-27 DIAGNOSIS — Z30.42 SURVEILLANCE FOR INJECTABLE MEDROXYPROGESTERONE/ESTRADIOL: Primary | ICD-10-CM

## 2019-12-27 PROCEDURE — 96372 THER/PROPH/DIAG INJ SC/IM: CPT

## 2019-12-27 RX ADMIN — MEDROXYPROGESTERONE ACETATE 150 MG: 150 INJECTION, SUSPENSION INTRAMUSCULAR at 11:28

## 2019-12-27 NOTE — PROGRESS NOTES
Clinic Administered Medication Documentation    MEDICATION LIST:   Depo Provera Documentation    Prior to injection, verified patient identity using patient's name and date of birth. Medication was administered. Please see MAR and medication order for additional information. Patient instructed to remain in clinic for 15 minutes.    BP: 137/78    LAST PAP/EXAM:   Lab Results   Component Value Date    PAP NIL 03/19/2018     URINE HCG:not indicated    NEXT INJECTION DUE: 3/13/20 - 3/27/20    Was entire vial of medication used? Yes  Vial/Syringe: Single dose vial  Expiration Date:  3/2021  Shereen Naqvi MA

## 2020-02-18 ENCOUNTER — OFFICE VISIT (OUTPATIENT)
Dept: FAMILY MEDICINE | Facility: CLINIC | Age: 25
End: 2020-02-18
Payer: COMMERCIAL

## 2020-02-18 VITALS
HEART RATE: 80 BPM | DIASTOLIC BLOOD PRESSURE: 81 MMHG | BODY MASS INDEX: 41.81 KG/M2 | WEIGHT: 263 LBS | SYSTOLIC BLOOD PRESSURE: 114 MMHG | TEMPERATURE: 98.2 F | OXYGEN SATURATION: 100 %

## 2020-02-18 DIAGNOSIS — Z30.42 ENCOUNTER FOR SURVEILLANCE OF INJECTABLE CONTRACEPTIVE: ICD-10-CM

## 2020-02-18 DIAGNOSIS — Z11.3 SCREEN FOR STD (SEXUALLY TRANSMITTED DISEASE): Primary | ICD-10-CM

## 2020-02-18 PROCEDURE — 99213 OFFICE O/P EST LOW 20 MIN: CPT | Performed by: PHYSICIAN ASSISTANT

## 2020-02-18 PROCEDURE — 87591 N.GONORRHOEAE DNA AMP PROB: CPT | Performed by: PHYSICIAN ASSISTANT

## 2020-02-18 PROCEDURE — 87389 HIV-1 AG W/HIV-1&-2 AB AG IA: CPT | Performed by: PHYSICIAN ASSISTANT

## 2020-02-18 PROCEDURE — 87491 CHLMYD TRACH DNA AMP PROBE: CPT | Performed by: PHYSICIAN ASSISTANT

## 2020-02-18 PROCEDURE — 36415 COLL VENOUS BLD VENIPUNCTURE: CPT | Performed by: PHYSICIAN ASSISTANT

## 2020-02-18 PROCEDURE — 86780 TREPONEMA PALLIDUM: CPT | Performed by: PHYSICIAN ASSISTANT

## 2020-02-18 PROCEDURE — 86803 HEPATITIS C AB TEST: CPT | Performed by: PHYSICIAN ASSISTANT

## 2020-02-18 PROCEDURE — 87340 HEPATITIS B SURFACE AG IA: CPT | Performed by: PHYSICIAN ASSISTANT

## 2020-02-18 RX ORDER — MEDROXYPROGESTERONE ACETATE 150 MG/ML
150 INJECTION, SUSPENSION INTRAMUSCULAR
Status: COMPLETED | OUTPATIENT
Start: 2020-03-13 | End: 2021-03-11

## 2020-02-18 ASSESSMENT — PAIN SCALES - GENERAL: PAINLEVEL: NO PAIN (0)

## 2020-02-18 NOTE — PROGRESS NOTES
Subjective     Meme Mccurdy is a 25 year old female who presents to clinic today for the following health issues:    HPI     Refill birth control and std check.  She has not been using condoms. She does not currently have symptoms.   She has heard rumors about her partner and desires testing.         Patient Active Problem List   Diagnosis     Melanocytic nevus of face     Ovarian cyst     Depression with anxiety     Insomnia     Encounter for initial prescription of injectable contraceptive     Morbid obesity (H)     Past Surgical History:   Procedure Laterality Date     HC TOOTH EXTRACTION W/FORCEP  9/08     TONSILLECTOMY Bilateral 12/19/2016    Procedure: TONSILLECTOMY;  Surgeon: Ilya Montilla MD;  Location:  OR       Social History     Tobacco Use     Smoking status: Never Smoker     Smokeless tobacco: Never Used   Substance Use Topics     Alcohol use: No     Family History   Problem Relation Age of Onset     Hypertension Mother      Arthritis Mother      Lipids Father         High Cholesterol     Breast Cancer Maternal Grandmother      Cancer Maternal Grandmother      Diabetes Maternal Grandfather      Hypertension Maternal Grandfather      Cerebrovascular Disease Other      Thyroid Disease No family hx of      Glaucoma No family hx of      Macular Degeneration No family hx of          Current Outpatient Medications   Medication Sig Dispense Refill     cyclobenzaprine (FLEXERIL) 10 MG tablet Take 1 tablet (10 mg) by mouth nightly as needed for muscle spasms 30 tablet 0     ibuprofen (ADVIL,MOTRIN) 800 MG tablet Take 1 tablet by mouth every 8 hours as needed for pain. 60 tablet 1     naproxen (NAPROSYN) 500 MG tablet Take 1 tablet (500 mg) by mouth 2 times daily (with meals) 30 tablet 1     nystatin (MYCOSTATIN) 992963 UNIT/GM POWD Apply topically 3 times daily as needed 60 g 1     sertraline (ZOLOFT) 100 MG tablet Take 2 tablets (200 mg) by mouth daily 180 tablet 1     Allergies   Allergen Reactions      Nkda [No Known Drug Allergies]      BP Readings from Last 3 Encounters:   02/18/20 114/81   12/27/19 137/78   12/11/19 116/80    Wt Readings from Last 3 Encounters:   02/18/20 119.3 kg (263 lb)   12/27/19 117.9 kg (260 lb)   12/11/19 117.9 kg (260 lb)                    Reviewed and updated as needed this visit by Provider         Review of Systems   ROS COMP: Constitutional, HEENT, cardiovascular, pulmonary, GI, , musculoskeletal, neuro, skin, endocrine and psych systems are negative, except as otherwise noted.      Objective    /81   Pulse 80   Temp 98.2  F (36.8  C) (Oral)   Wt 119.3 kg (263 lb)   SpO2 100%   BMI 41.81 kg/m    Body mass index is 41.81 kg/m .  Physical Exam   GENERAL: healthy, alert and no distress  PSYCH: mentation appears normal, affect normal/bright    Diagnostic Test Results:  Labs reviewed in Epic        Assessment & Plan     1. Screen for STD (sexually transmitted disease)  Std screening completed today.   I will send results via [employer] or call if positive   Condom use advised  - NEISSERIA GONORRHOEA PCR  - CHLAMYDIA TRACHOMATIS PCR  - Hepatitis C antibody  - Hepatitis B surface antigen  - Treponema Abs w Reflex to RPR and Titer  - HIV Antigen Antibody Combo    2. Encounter for surveillance of injectable contraceptive  Refills completed. She is due in March.   - medroxyPROGESTERone (DEPO-PROVERA) injection 150 mg     No follow-ups on file.    Kristen M. Kehr, PA-C  [facility]

## 2020-02-18 NOTE — NURSING NOTE
"Chief Complaint   Patient presents with     Contraception     refill     STD       Initial /81   Pulse 80   Temp 98.2  F (36.8  C) (Oral)   Wt 119.3 kg (263 lb)   SpO2 100%   BMI 41.81 kg/m   Estimated body mass index is 41.81 kg/m  as calculated from the following:    Height as of 12/11/19: 1.689 m (5' 6.5\").    Weight as of this encounter: 119.3 kg (263 lb).  Medication Reconciliation: complete    JYOTI Marquez MA    "

## 2020-02-19 LAB
C TRACH DNA SPEC QL NAA+PROBE: NEGATIVE
HBV SURFACE AG SERPL QL IA: NONREACTIVE
HCV AB SERPL QL IA: NONREACTIVE
HIV 1+2 AB+HIV1 P24 AG SERPL QL IA: NONREACTIVE
N GONORRHOEA DNA SPEC QL NAA+PROBE: NEGATIVE
SPECIMEN SOURCE: NORMAL
SPECIMEN SOURCE: NORMAL
T PALLIDUM AB SER QL: NONREACTIVE

## 2020-03-20 ENCOUNTER — ALLIED HEALTH/NURSE VISIT (OUTPATIENT)
Dept: NURSING | Facility: CLINIC | Age: 25
End: 2020-03-20
Payer: COMMERCIAL

## 2020-03-20 VITALS
RESPIRATION RATE: 20 BRPM | HEIGHT: 67 IN | HEART RATE: 75 BPM | TEMPERATURE: 97.6 F | DIASTOLIC BLOOD PRESSURE: 83 MMHG | BODY MASS INDEX: 41.32 KG/M2 | SYSTOLIC BLOOD PRESSURE: 127 MMHG | WEIGHT: 263.25 LBS | OXYGEN SATURATION: 97 %

## 2020-03-20 DIAGNOSIS — Z30.9 CONTRACEPTIVE MANAGEMENT: Primary | ICD-10-CM

## 2020-03-20 PROCEDURE — 99207 ZZC NO CHARGE NURSE ONLY: CPT

## 2020-03-20 PROCEDURE — 96372 THER/PROPH/DIAG INJ SC/IM: CPT

## 2020-03-20 RX ADMIN — MEDROXYPROGESTERONE ACETATE 150 MG: 150 INJECTION, SUSPENSION INTRAMUSCULAR at 10:03

## 2020-03-20 ASSESSMENT — MIFFLIN-ST. JEOR: SCORE: 1981.72

## 2020-03-20 NOTE — PROGRESS NOTES
Clinic Administered Medication Documentation      Depo Provera Documentation    URINE HCG: not indicated    Depo-Provera Standing Order inclusion/exclusion criteria reviewed.   Patient meets: inclusion criteria     BP: 127/83  LAST PAP/EXAM:   Lab Results   Component Value Date    PAP NIL 03/19/2018       Prior to injection, verified patient identity using patient's name and date of birth. Medication was administered. Please see MAR and medication order for additional information.     Was entire vial of medication used? Yes  Vial/Syringe: Single dose vial  Expiration Date:  10/2020    Patient instructed to report any adverse reaction to staff immediately .  NEXT INJECTION DUE: 6/5/20 - 6/19/20    Hever Clemons MA        The following medication was given:     MEDICATION: Depo Provera 150mg  ROUTE: IM  SITE: LUQ - Gluteus  : [employer]   LOT #: [id number]  EXP:10/2020  NDC: 68584-993-55  NEXT INJECTION DUE: 6/5/20 - 6/19/20   Provider: Kehr, Kristen

## 2020-05-10 ENCOUNTER — E-VISIT (OUTPATIENT)
Dept: FAMILY MEDICINE | Facility: CLINIC | Age: 25
End: 2020-05-10
Payer: COMMERCIAL

## 2020-05-10 DIAGNOSIS — Z53.9 ERRONEOUS ENCOUNTER--DISREGARD: Primary | ICD-10-CM

## 2020-05-11 ENCOUNTER — VIRTUAL VISIT (OUTPATIENT)
Dept: FAMILY MEDICINE | Facility: OTHER | Age: 25
End: 2020-05-11

## 2020-05-11 NOTE — PROGRESS NOTES
"Date: 2020 13:13:12  Clinician: Hugh Bullock  Clinician NPI: [id number]  Patient: Meme Mccurdy  Patient : 1995  Patient Address: [address] [place] [address]  Patient Phone: [phone]  Visit Protocol: URI  Patient Summary:  Meme is a 25 year old ( : 1995 ) female who initiated a Visit for COVID-19 (Coronavirus) evaluation and screening. When asked the question \"Please sign me up to receive news, health information and promotions from [employer].\", Meme responded \"No\".    Meme states her symptoms started gradually 3-4 days ago.   Her symptoms consist of myalgia, rhinitis, a cough, tooth pain, a headache, and wheezing. She is experiencing mild difficulty breathing with activities but can speak normally in full sentences.   Symptom details     Nasal secretions: The color of her mucus is clear.    Cough: Meme coughs a few times an hour and her cough is not more bothersome at night. Phlegm does not come into her throat when she coughs. She does not believe her cough is caused by post-nasal drip.     Wheezing: Meme has not ever been diagnosed with asthma. The wheezing does not interfere with her normal daily activities.    Headache: She states the headache is mild (1-3 on a 10 point pain scale).     Tooth pain: The tooth pain is not caused by a cavity, recent dental work, or other mouth problems.      Meme denies having fever, facial pain or pressure, sore throat, nasal congestion, vomiting, nausea, ageusia, anosmia, diarrhea, ear pain, malaise, enlarged lymph nodes, and chills. She also denies taking antibiotic medication for the symptoms, double sickening (worsening symptoms after initial improvement), and having recent facial or sinus surgery in the past 60 days.   Precipitating events  She has not recently been exposed to someone with influenza. Meme has not been in close contact with any high risk individuals.   Pertinent COVID-19 (Coronavirus) information  Meme does " not work or volunteer as healthcare worker or a  and does not work or volunteer in a healthcare facility.   She does not live with a healthcare worker.   Meme has not had a close contact with a laboratory-confirmed COVID-19 patient within 14 days of symptom onset. She also has not had a close contact with a suspected COVID-19 patient within 14 days of symptom onset.   Pertinent medical history  Meme does not get yeast infections when she takes antibiotics.   Meme does not need a return to work/school note.   Weight: 260 lbs   Meme does not smoke or use smokeless tobacco.   She denies pregnancy and denies breastfeeding. She does not menstruate.   Additional information as reported by the patient (free text): I usually get Bronchitis at least once a year, and it's usually the same symptoms each time   Weight: 260 lbs    MEDICATIONS: sertraline oral, ALLERGIES: NKDA  Clinician Response:  Dear Meme,  Based on the information provided, you have viral bronchitis, also known as a chest cold. This is a cough that occurs when a cold or other virus settles into your chest. The cough may be dry or you could notice you are coughing up some phlegm.  It is not unusual for a cough to last 3 weeks or more. Treatment focuses on controlling your symptoms as much as possible while you recover.  Medication information  Because you have a viral infection, antibiotics will not help you get better. Treating a viral infection with antibiotics could actually make you feel worse.  For more information on why I am not prescribing antibiotics, please watch this video: Antibiotics Aren't Always the Answer.  Unless you are allergic to the over-the-counter medication(s) below, I recommend using:       Acetaminophen (Tylenol or store brand) oral tablet. Take 1-2 tablets by mouth every 4-6 hours to help with the discomfort.    A decongestant such as Sudafed PE or store brand.      Guaifenesin + dextromethorphan (Robitussin  DM, Mucinex DM, or store brand).     Over-the-counter medications do not require a prescription. Ask the pharmacist if you have any questions.  Self care  Steps you can take to be as comfortable as possible:     Rest.    Drink plenty of fluids.    Take a warm shower to loosen congestion    Use a cool-mist humidifier.    Take a spoonful of honey to reduce your cough.     When to seek care  Please be seen in a clinic or urgent care if any of the following occur:   New symptoms develop, or symptoms become worse   Call ahead before going to the clinic or urgent care.  COVID-19 (Coronavirus) General Information  Because there is currently no vaccine to prevent infection, the best way to protect yourself is to avoid being exposed to this virus. Common symptoms of COVID-19 include but are not limited to fever, cough, and shortness of breath. These symptoms appear 2-14 days after you are exposed to the virus that causes COVID-19. Click here for more information from the CDC on how to protect yourself.  If you are sick with COVID-19 or suspect you are infected with the virus that causes COVID-19, follow the steps here from the CDC to help prevent the disease from spreading to people in your home and community.  Click here for general information from the CDC on testing.  If you develop any of these emergency warning signs for COVID-19, get medical attention immediately:     Trouble breathing    Persistent pain or pressure in the chest    New confusion or inability to arouse    Bluish lips or face      Call your doctor or clinic before going in. Call 911 if you have a medical emergency and notify the  you have or think you may have COVID-19.  For more detailed and up to date information on COVID-19 (Coronavirus), please visit the CDC website.   Diagnosis: Viral bronchitis  Diagnosis ICD: J40

## 2020-06-16 ENCOUNTER — ALLIED HEALTH/NURSE VISIT (OUTPATIENT)
Dept: NURSING | Facility: CLINIC | Age: 25
End: 2020-06-16
Payer: COMMERCIAL

## 2020-06-16 VITALS
BODY MASS INDEX: 42.6 KG/M2 | WEIGHT: 272 LBS | SYSTOLIC BLOOD PRESSURE: 122 MMHG | DIASTOLIC BLOOD PRESSURE: 81 MMHG | HEART RATE: 98 BPM

## 2020-06-16 DIAGNOSIS — Z30.42 SURVEILLANCE FOR DEPO-PROVERA CONTRACEPTION: Primary | ICD-10-CM

## 2020-06-16 PROCEDURE — 96372 THER/PROPH/DIAG INJ SC/IM: CPT

## 2020-06-16 RX ADMIN — MEDROXYPROGESTERONE ACETATE 150 MG: 150 INJECTION, SUSPENSION INTRAMUSCULAR at 14:07

## 2020-06-16 NOTE — PROGRESS NOTES
Clinic Administered Medication Documentation      Depo Provera Documentation    URINE HCG: not indicated    Depo-Provera Standing Order inclusion/exclusion criteria reviewed.   Patient meets: inclusion criteria     BP: Data Unavailable  LAST PAP/EXAM:   Lab Results   Component Value Date    PAP NIL 03/19/2018       Prior to injection, verified patient identity using patient's name and date of birth. Medication was administered. Please see MAR and medication order for additional information.     Was entire vial of medication used? Yes  Vial/Syringe: Single dose vial  Expiration Date:  10/20    Patient instructed to remain in clinic for 15 minutes.  NEXT INJECTION DUE: 9/2/20 - 9/16/20

## 2020-06-20 DIAGNOSIS — F41.8 DEPRESSION WITH ANXIETY: ICD-10-CM

## 2020-06-24 RX ORDER — SERTRALINE HYDROCHLORIDE 100 MG/1
200 TABLET, FILM COATED ORAL DAILY
Qty: 180 TABLET | Refills: 0 | Status: SHIPPED | OUTPATIENT
Start: 2020-06-24 | End: 2020-11-18

## 2020-06-24 NOTE — TELEPHONE ENCOUNTER
Sertraline refill request  Last OV: 2/18/20 STD check and birth control refill  PHQ 11/25/2019 12/11/2019 6/24/2020   PHQ-9 Total Score 7 9 10   Q9: Thoughts of better off dead/self-harm past 2 weeks Not at all Not at all Not at all     Phq9: 10.  No suicidal thoughts.

## 2020-07-08 NOTE — NURSING NOTE
BLOOD PRESSURE: 107/76    The following medication was given:     MEDICATION: Depo Provera 150mg  ROUTE: IM  SITE: [facility]  : LIQUITY  LOT #: [id number]  EXPIRATION:8/2019  NEXT INJECTION DUE: June 16-30 2017  Provider: KRISTEN KEHR Elizabeth French, MA   HR=74 bpm, GSRC=056/97 mmhg, FeZ6=737.0 %, Resp=12 B/min, EtCO2=29 mmHg, Apnea=0 Seconds, Pain=0, Skyler=9, Steiner=2

## 2020-08-24 ENCOUNTER — TELEPHONE (OUTPATIENT)
Dept: ORTHOPEDICS | Facility: CLINIC | Age: 25
End: 2020-08-24

## 2020-08-24 NOTE — TELEPHONE ENCOUNTER
"Fell onto the ground and landed on her elbow about 30 min.   Looks like it may be dislocated due to it \"being out of place\".   Stated she was able to move her elbow somewhat but she was concerned about the amount of swelling and it looking like it was out of joint.  Discussed that we would not be able to reduce an elbow dislocation in clinic due to not having necessary pain control if needed.  She was in agreement to be seen in the ER.    All questions were answered  Ro Pedro MS ATC        "

## 2020-09-16 ENCOUNTER — ALLIED HEALTH/NURSE VISIT (OUTPATIENT)
Dept: NURSING | Facility: CLINIC | Age: 25
End: 2020-09-16
Payer: COMMERCIAL

## 2020-09-16 DIAGNOSIS — Z23 NEED FOR PROPHYLACTIC VACCINATION AND INOCULATION AGAINST INFLUENZA: Primary | ICD-10-CM

## 2020-09-16 PROCEDURE — 90471 IMMUNIZATION ADMIN: CPT

## 2020-09-16 PROCEDURE — 96372 THER/PROPH/DIAG INJ SC/IM: CPT

## 2020-09-16 PROCEDURE — 90686 IIV4 VACC NO PRSV 0.5 ML IM: CPT

## 2020-09-16 RX ADMIN — MEDROXYPROGESTERONE ACETATE 150 MG: 150 INJECTION, SUSPENSION INTRAMUSCULAR at 09:45

## 2020-09-16 NOTE — PROGRESS NOTES
Clinic Administered Medication Documentation      Depo Provera Documentation    URINE HCG: not indicated    Depo-Provera Standing Order inclusion/exclusion criteria reviewed.   Patient meets: inclusion criteria     BP: Data Unavailable  LAST PAP/EXAM:   Lab Results   Component Value Date    PAP NIL 03/19/2018       Prior to injection, verified patient identity using patient's name and date of birth. Medication was administered. Please see MAR and medication order for additional information.     Was entire vial of medication used? Yes  Vial/Syringe: Single dose vial  Expiration Date:  02/2022    Patient instructed to report any adverse reaction to staff immediately .  NEXT INJECTION DUE: 12/2/20 - 12/16/20    Keeley Diaz CMA

## 2020-10-08 ENCOUNTER — VIRTUAL VISIT (OUTPATIENT)
Dept: FAMILY MEDICINE | Facility: OTHER | Age: 25
End: 2020-10-08

## 2020-10-08 NOTE — PROGRESS NOTES
"Subjective     Meme Mccurdy is a 25 year old female who presents to clinic today for the following health issues:    HPI     Acute Illness  Acute illness concerns: breathing concern  Onset/Duration: 1 week  Symptoms:  Fever: no  Chills/Sweats: no  Headache (location?): YES  Sinus Pressure: no  Conjunctivitis:  no  Ear Pain: no  Rhinorrhea: no  Congestion: no  Sore Throat: no  Cough: no  Wheeze: no  Decreased Appetite: no  Nausea: YES- multiple times through out the days  Vomiting: no  Diarrhea: no  Dysuria/Freq.: no  Dysuria or Hematuria: no  Fatigue/Achiness: no  Sick/Strep Exposure: no  Therapies tried and outcome: None    Can feel \"heartbeat in her throat\" x 1 week per patient.   Concerned about bronchitis (as she gets several times a year)  high blood pressure as it runs in family. Her blood pressure today is normal. She is morbidly obese with bmi 43.   Bronchitis in the past has given her chest tightness. Doesn't feel that now. No shortness of breath just doesn't like the feeling of her heart beating. Notices more when breathes in but it noticing it now just sitting there.  Not worse with exertion. Sometimes is worse after eating. Notices it when she is getting ready for bed. Is very stressed with school. Is going for social studies education. Stress got worse before this started.     Never had an ekg.     Sleep-comes and goes. Can get bad insomnia. Doesn't take anything to help her sleep because it caused her to oversleep once.   She tried trazadone.       Has a therapist she sees.       No history of heart issues.     H/o asthma? None.     No palpitation symptoms.     Nonsmoker.     On zoloft for depression/anxiety. Depot for birth control.     Oxygen is 98 percent.     Denies suicidal or homicidal thoughts.  Patient instructed to go to the emergency room or call 911 if these occur.      Review of Systems   Constitutional, HEENT, cardiovascular, pulmonary, GI, , musculoskeletal, neuro, skin, endocrine and " psych systems are negative, except as otherwise noted.      Objective    /80   Pulse 80   Temp 98.2  F (36.8  C) (Tympanic)   Wt 125.6 kg (277 lb)   SpO2 98%   BMI 43.38 kg/m    Body mass index is 43.38 kg/m .  Physical Exam   GENERAL: alert, no distress and obese  NECK: no adenopathy, no asymmetry, masses, or scars and thyroid normal to palpation  RESP: lungs clear to auscultation - no rales, rhonchi or wheezes  CV: regular rate and rhythm, normal S1 S2, no S3 or S4, no murmur, click or rub, no peripheral edema and peripheral pulses strong  MS: no gross musculoskeletal defects noted, no edema  SKIN: no suspicious lesions or rashes  NEURO: Normal strength and tone, mentation intact and speech normal  PSYCH: mentation appears normal and anxious    EKG - appears normal, NSR, normal axis, normal intervals, no acute ST/T changes c/w ischemia, no LVH by voltage criteria, unchanged from previous tracings    No results found for any visits on 10/09/20.      Assessment & Plan     Palpitations  Suspect anxiety, see more below  Will rule out thyroid issue, anemia, and others  She will f/u with PCP to discuss change in 200 mg zoloft to something else if needed  She will see her therapist asap (they are on leave right now) or try to see a new one in the meantime  To emergency room with worsening symptoms    No chest pain, more her feeling her heart beating. Blood pressure is normal today and she is having symptoms    - CBC with platelets and differential  - TSH with free T4 reflex  - Basic metabolic panel  - EKG 12-lead complete w/read - Clinics    Morbid obesity (H)  Discuss with pcp    Depression with anxiety  Worsening anxiety    Insomnia, unspecified type  Worsening  Didn't tolerate trazadone  Consider psych in future also  - hydrOXYzine (ATARAX) 25 MG tablet; Take 2-3 tablets (50-75 mg) by mouth nightly as needed for other (sleep)     BMI:   Estimated body mass index is 43.38 kg/m  as calculated from the  "following:    Height as of 3/20/20: 1.702 m (5' 7\").    Weight as of this encounter: 125.6 kg (277 lb).   Weight management plan: Patient was referred to their PCP to discuss a diet and exercise plan.       Depression Screening Follow Up    PHQ 10/9/2020   PHQ-9 Total Score 16   Q9: Thoughts of better off dead/self-harm past 2 weeks Not at all     Last PHQ-9 10/9/2020   1.  Little interest or pleasure in doing things 1   2.  Feeling down, depressed, or hopeless 1   3.  Trouble falling or staying asleep, or sleeping too much 3   4.  Feeling tired or having little energy 3   5.  Poor appetite or overeating 2   6.  Feeling bad about yourself 2   7.  Trouble concentrating 2   8.  Moving slowly or restless 2   Q9: Thoughts of better off dead/self-harm past 2 weeks 0   PHQ-9 Total Score 16   Difficulty at work, home, or with people Very difficult             Patient Instructions   EKG looks normal. I will follow up with labs. If those are normal and symptoms have continued, we can get an echocardiogram (ultrasound of your heart). I suspect this is from stress/anxiety.         Lifestyle recommendations:  Being overweight or obese puts you are risk of major health problems including but not limited to: heart disease/heart attack, stroke, high cholesterol, high blood pressure, and diabetes.  This is why it is important to be at a healthy weight for your height.     Exercise 30 minutes 3-5 times a week, if you can only do 10 minutes 3 times a week that is still shown to have great benefit!  Brisk walking even counts for this.  Consider free youtube videos for exercise that fits your needs and lifestyle.     Monitor your caffeine and soda intake, try to minimize these beverages    Drink plenty of water (about 70-80 ounces a day)    Try to eat a vegetable and fruit  with lunch and dinner.  Have a breakfast that contains protein such as eggs or oatmeal.  Decrease your white bread, pasta, and sweets intake.  Increase lean " proteins like chicken or pork. Try to eat out 1-2 times a week or less.  Monitor your portion sizes, try using smaller plates if needed.  Eat slowly, this gives you time to be aware that your body is full.   Let me know at any time if you would like a referral to a nutritionist!            No follow-ups on file.    Jazmin Olivares PA-C  [facility]

## 2020-10-08 NOTE — PROGRESS NOTES
"Date: 10/08/2020 13:44:51  Clinician: Berenice Hernandez  Clinician NPI: [id number]  Patient: Meme Mccurdy  Patient : 1995  Patient Address: [facility] [address], [place] [address]  Patient Phone: [phone]  Visit Protocol: Allergic rhinitis  Patient Summary:  Meme is a 25 year old ( : 1995 ) female who initiated a [facility] Visit for evaluation of seasonal allergies.  When asked the question \"Please sign me up to receive news, health information and promotions from [facility].\", Meme responded \"No\".    Meme currently has allergy symptoms and experiences allergies every year.   Symptom details  Her current symptoms started less than a week ago and consist of nausea, vomiting, and/or diarrhea, myalgia, new or worsening cough, and headache. She is experiencing mild difficulty breathing with activities but can speak normally in full sentences.    Headache: Her headache is moderate (4-6 on a 10 point pain scale).    Note: Possible COVID-19 symptoms are highlighted in red.  Denied symptoms include wheezing, rhinitis, ageusia, anosmia, sore throat, facial pain or pressure, eye redness (bloodshot eyes), more tears than usual, dry eyes, itchy eyes, itchy nose, scratchy throat, itching of the roof of the mouth, itching of the inner ear, postnasal drip, and sneezing. She does not feel feverish.   Meme is not aware of any allergens that make her symptoms worse.   Pertinent medical history  Meme does not have nasal ulcers or perforations.   Meme has not tried allergy treatments.   Meme does not smoke or use smokeless tobacco.   She denies pregnancy and denies breastfeeding. She does not menstruate.   Additional information as reported by the patient (free text): When I breathe at times, I can feel my heartbeat in my throat, and it lasts maybe 5 minutes every time and happens  periodically throughout the day. I am not sure if this is the start to bronchitis, as I get bronchitis multiple times a " year or if it's heart palpitations but it has been happening for a week now and I do not know how to deal with it.     MEDICATIONS: sertraline oral, ALLERGIES: NKDA  Clinician Response:  Dear Meme,  I am sorry you are not feeling well. To determine the most appropriate care for you, I would like you to be seen in person to further discuss your health history and symptoms.  You will not be charged for this [facility] Visit. Thank you for trusting us with your care.   Diagnosis: Refer for additional evaluation  Diagnosis ICD: R69   Additional Safety/Bands:

## 2020-10-09 ENCOUNTER — OFFICE VISIT (OUTPATIENT)
Dept: FAMILY MEDICINE | Facility: CLINIC | Age: 25
End: 2020-10-09
Payer: COMMERCIAL

## 2020-10-09 VITALS
DIASTOLIC BLOOD PRESSURE: 80 MMHG | SYSTOLIC BLOOD PRESSURE: 114 MMHG | TEMPERATURE: 98.2 F | WEIGHT: 277 LBS | OXYGEN SATURATION: 98 % | BODY MASS INDEX: 43.38 KG/M2 | HEART RATE: 80 BPM

## 2020-10-09 DIAGNOSIS — E66.01 MORBID OBESITY (H): ICD-10-CM

## 2020-10-09 DIAGNOSIS — F41.8 DEPRESSION WITH ANXIETY: ICD-10-CM

## 2020-10-09 DIAGNOSIS — R00.2 PALPITATIONS: Primary | ICD-10-CM

## 2020-10-09 DIAGNOSIS — G47.00 INSOMNIA, UNSPECIFIED TYPE: ICD-10-CM

## 2020-10-09 LAB
ANION GAP SERPL CALCULATED.3IONS-SCNC: 6 MMOL/L (ref 3–14)
BASOPHILS # BLD AUTO: 0 10E9/L (ref 0–0.2)
BASOPHILS NFR BLD AUTO: 0.2 %
BUN SERPL-MCNC: 10 MG/DL (ref 7–30)
CALCIUM SERPL-MCNC: 8.6 MG/DL (ref 8.5–10.1)
CHLORIDE SERPL-SCNC: 107 MMOL/L (ref 94–109)
CO2 SERPL-SCNC: 25 MMOL/L (ref 20–32)
CREAT SERPL-MCNC: 0.78 MG/DL (ref 0.52–1.04)
DIFFERENTIAL METHOD BLD: NORMAL
EOSINOPHIL # BLD AUTO: 0.1 10E9/L (ref 0–0.7)
EOSINOPHIL NFR BLD AUTO: 1 %
ERYTHROCYTE [DISTWIDTH] IN BLOOD BY AUTOMATED COUNT: 12.5 % (ref 10–15)
GFR SERPL CREATININE-BSD FRML MDRD: >90 ML/MIN/{1.73_M2}
GLUCOSE SERPL-MCNC: 81 MG/DL (ref 70–99)
HCT VFR BLD AUTO: 40.2 % (ref 35–47)
HGB BLD-MCNC: 13.1 G/DL (ref 11.7–15.7)
LYMPHOCYTES # BLD AUTO: 2.4 10E9/L (ref 0.8–5.3)
LYMPHOCYTES NFR BLD AUTO: 23.8 %
MCH RBC QN AUTO: 29.4 PG (ref 26.5–33)
MCHC RBC AUTO-ENTMCNC: 32.6 G/DL (ref 31.5–36.5)
MCV RBC AUTO: 90 FL (ref 78–100)
MONOCYTES # BLD AUTO: 0.7 10E9/L (ref 0–1.3)
MONOCYTES NFR BLD AUTO: 6.4 %
NEUTROPHILS # BLD AUTO: 6.9 10E9/L (ref 1.6–8.3)
NEUTROPHILS NFR BLD AUTO: 68.6 %
PLATELET # BLD AUTO: 256 10E9/L (ref 150–450)
POTASSIUM SERPL-SCNC: 3.8 MMOL/L (ref 3.4–5.3)
RBC # BLD AUTO: 4.45 10E12/L (ref 3.8–5.2)
SODIUM SERPL-SCNC: 138 MMOL/L (ref 133–144)
TSH SERPL DL<=0.005 MIU/L-ACNC: 1.84 MU/L (ref 0.4–4)
WBC # BLD AUTO: 10.1 10E9/L (ref 4–11)

## 2020-10-09 PROCEDURE — 85025 COMPLETE CBC W/AUTO DIFF WBC: CPT | Performed by: PHYSICIAN ASSISTANT

## 2020-10-09 PROCEDURE — 96127 BRIEF EMOTIONAL/BEHAV ASSMT: CPT | Performed by: PHYSICIAN ASSISTANT

## 2020-10-09 PROCEDURE — 84443 ASSAY THYROID STIM HORMONE: CPT | Performed by: PHYSICIAN ASSISTANT

## 2020-10-09 PROCEDURE — 80048 BASIC METABOLIC PNL TOTAL CA: CPT | Performed by: PHYSICIAN ASSISTANT

## 2020-10-09 PROCEDURE — 99214 OFFICE O/P EST MOD 30 MIN: CPT | Performed by: PHYSICIAN ASSISTANT

## 2020-10-09 PROCEDURE — 93000 ELECTROCARDIOGRAM COMPLETE: CPT | Performed by: PHYSICIAN ASSISTANT

## 2020-10-09 RX ORDER — HYDROXYZINE HYDROCHLORIDE 25 MG/1
50-75 TABLET, FILM COATED ORAL
Qty: 60 TABLET | Refills: 1 | Status: SHIPPED | OUTPATIENT
Start: 2020-10-09 | End: 2022-07-29

## 2020-10-09 ASSESSMENT — ANXIETY QUESTIONNAIRES
GAD7 TOTAL SCORE: 14
5. BEING SO RESTLESS THAT IT IS HARD TO SIT STILL: SEVERAL DAYS
7. FEELING AFRAID AS IF SOMETHING AWFUL MIGHT HAPPEN: SEVERAL DAYS
IF YOU CHECKED OFF ANY PROBLEMS ON THIS QUESTIONNAIRE, HOW DIFFICULT HAVE THESE PROBLEMS MADE IT FOR YOU TO DO YOUR WORK, TAKE CARE OF THINGS AT HOME, OR GET ALONG WITH OTHER PEOPLE: EXTREMELY DIFFICULT
1. FEELING NERVOUS, ANXIOUS, OR ON EDGE: NEARLY EVERY DAY
2. NOT BEING ABLE TO STOP OR CONTROL WORRYING: MORE THAN HALF THE DAYS
3. WORRYING TOO MUCH ABOUT DIFFERENT THINGS: MORE THAN HALF THE DAYS
6. BECOMING EASILY ANNOYED OR IRRITABLE: NEARLY EVERY DAY

## 2020-10-09 ASSESSMENT — PATIENT HEALTH QUESTIONNAIRE - PHQ9
5. POOR APPETITE OR OVEREATING: MORE THAN HALF THE DAYS
SUM OF ALL RESPONSES TO PHQ QUESTIONS 1-9: 16

## 2020-10-09 ASSESSMENT — PAIN SCALES - GENERAL: PAINLEVEL: NO PAIN (0)

## 2020-10-09 NOTE — NURSING NOTE
"Chief Complaint   Patient presents with     Breathing Problem     Possible bronchitis?     Health Maintenance     orders pended       Initial /80   Pulse 80   Temp 98.2  F (36.8  C) (Tympanic)   Wt 125.6 kg (277 lb)   SpO2 98%   BMI 43.38 kg/m   Estimated body mass index is 43.38 kg/m  as calculated from the following:    Height as of 3/20/20: 1.702 m (5' 7\").    Weight as of this encounter: 125.6 kg (277 lb).  Medication Reconciliation: complete    JYOTI Marquez MA    "

## 2020-10-09 NOTE — PATIENT INSTRUCTIONS
EKG looks normal. I will follow up with labs. If those are normal and symptoms have continued, we can get an echocardiogram (ultrasound of your heart). I suspect this is from stress/anxiety.         Lifestyle recommendations:  Being overweight or obese puts you are risk of major health problems including but not limited to: heart disease/heart attack, stroke, high cholesterol, high blood pressure, and diabetes.  This is why it is important to be at a healthy weight for your height.     Exercise 30 minutes 3-5 times a week, if you can only do 10 minutes 3 times a week that is still shown to have great benefit!  Brisk walking even counts for this.  Consider free youtube videos for exercise that fits your needs and lifestyle.     Monitor your caffeine and soda intake, try to minimize these beverages    Drink plenty of water (about 70-80 ounces a day)    Try to eat a vegetable and fruit  with lunch and dinner.  Have a breakfast that contains protein such as eggs or oatmeal.  Decrease your white bread, pasta, and sweets intake.  Increase lean proteins like chicken or pork. Try to eat out 1-2 times a week or less.  Monitor your portion sizes, try using smaller plates if needed.  Eat slowly, this gives you time to be aware that your body is full.   Let me know at any time if you would like a referral to a nutritionist!

## 2020-10-10 ASSESSMENT — ANXIETY QUESTIONNAIRES: GAD7 TOTAL SCORE: 14

## 2020-10-12 NOTE — RESULT ENCOUNTER NOTE
Komal Valentine,       Your recent test results are attached, if you have any questions or concerns please feel free to contact me via e-mail or call [phone].  Thyroid is normal. All labs normal. Sodium and potassium normal. Blood sugar (glucose) normal.  Creatinine and GFR normal, which means kidney function is normal.  White and red blood cell counts normal, no anemia. Nothing to explain symptoms.  If you are still having symptoms we could get that echocardiogram (ultrasound of your heart) to be 100 percent sure there is nothing wrong with your heart. Please send me a message with how you are feeling.          It was a pleasure to see you at your recent office visit.      Sincerely,  Jazmin Olivares PA-C

## 2020-11-10 ENCOUNTER — VIRTUAL VISIT (OUTPATIENT)
Dept: FAMILY MEDICINE | Facility: OTHER | Age: 25
End: 2020-11-10
Payer: COMMERCIAL

## 2020-11-10 PROCEDURE — 99421 OL DIG E/M SVC 5-10 MIN: CPT | Performed by: NURSE PRACTITIONER

## 2020-11-10 NOTE — PROGRESS NOTES
"Date: 11/10/2020 00:47:41  Clinician: Trista Knight  Clinician NPI: [id number]  Patient: Meme Mccurdy  Patient : 1995  Patient Address: [address] [place], [place] [address] [place] [address]  Patient Phone: [phone]  Visit Protocol: URI  Patient Summary:  Meme is a 25 year old ( : 1995 ) female who initiated a [facility] Visit for COVID-19 (Coronavirus) evaluation and screening. When asked the question \"Please sign me up to receive news, health information and promotions from [facility].\", Meme responded \"No\".    When asked when her symptoms started, Meme reported that she does not have any symptoms.   She denies taking antibiotic medication in the past month and having recent facial or sinus surgery in the past 60 days.    Pertinent COVID-19 (Coronavirus) information  Meme does not work or volunteer as healthcare worker or a . In the past 14 days, Meme has not worked or volunteered at a healthcare facility or group living setting.   In the past 14 days, she also has not lived in a congregate living setting.   Meme has had a close contact with a laboratory-confirmed COVID-19 patient in the last 14 days. She was not exposed at her work. Date Meme was exposed to the laboratory-confirmed COVID-19 patient: 2020   Additional information about contact with COVID-19 (Coronavirus) patient as reported by the patient (free text): Picked my friend up from the airport and drove her home   Meme is not living in the same household with the COVID-19 positive patient. She was in an enclosed space for greater than 15 minutes with the COVID-19 patient.   During the encounter, neither were wearing masks.   Since 2019, Meme has not been tested for COVID-19 and has not had upper respiratory infection or influenza-like illness.   Pertinent medical history  Meme does not get yeast infections when she takes antibiotics.   Meme needs a return to work/school note.   Weight: 270 lbs   Meme " does not smoke or use smokeless tobacco.   She denies pregnancy and denies breastfeeding. She does not menstruate.   Weight: 270 lbs    MEDICATIONS: sertraline oral, ALLERGIES: NKDA  Clinician Response:  Dear Meme,   Your symptoms show that you may have coronavirus (COVID-19). This illness can cause fever, cough and trouble breathing. Many people get a mild case and get better on their own. Some people can get very sick.  What should I do?  We would like to test you for this virus.   1. Please call [phone] to schedule your visit. Explain that you were [facility] have [facility] COVID-[facility]. Be ready to share your [facility] visit ID number.  * If you need to schedule in [facility] please call [phone] or for [employer] employees please call [phone].  * If you need to schedule in the [place] area please call [phone]. [employer] employees call [phone].  The following will serve as your written order for this COVID Test, ordered by me, for the indication of suspected COVID [Z20.828]: The test will be ordered in [employer], our electronic health record, after you are scheduled. It will show as ordered and authorized by Ponce Perez MD.  Order: COVID-19 (Coronavirus) PCR for SYMPTOMATIC testing from [facility].   2. When it's time for your COVID test:  Stay at least 6 feet away from others. (If someone will drive you to your test, stay in the backseat, as far away from the  as you can.)   Cover your mouth and nose with a mask, tissue or washcloth.  Go straight to the testing site. Don't make any stops on the way there or back.      3.Starting now: Stay home and away from others (self-isolate) until:   You've had no fever---and no medicine that reduces fever---for one full day (24 hours). And...   Your other symptoms have gotten better. For example, your cough or breathing has improved. And...   At least 10 days have passed since your symptoms started.       During this time, don't leave the house except  "for testing or medical care.   Stay in your own room, even for meals. Use your own bathroom if you can.   Stay away from others in your home. No hugging, kissing or shaking hands. No visitors.  Don't go to work, school or anywhere else.    Clean \"high touch\" surfaces often (doorknobs, counters, handles, etc.). Use a household cleaning spray or wipes. You'll find a full list of  on the EPA website: www.epa.gov/pesticide-registration/list-n-disinfectants-use-against-sars-cov-2.   Cover your mouth and nose with a mask, tissue or washcloth to avoid spreading germs.  Wash your hands and face often. Use soap and water.  Caregivers in these groups are at risk for severe illness due to COVID-19:  o People 65 years and older  o People who live in a nursing home or long-term care facility  o People with chronic disease (lung, heart, cancer, diabetes, kidney, liver, immunologic)  o People who have a weakened immune system, including those who:   Are in cancer treatment  Take medicine that weakens the immune system, such as corticosteroids  Had a bone marrow or organ transplant  Have an immune deficiency  Have poorly controlled HIV or AIDS  Are obese (body mass index of 40 or higher)  Smoke regularly   o Caregivers should wear gloves while washing dishes, handling laundry and cleaning bedrooms and bathrooms.  o Use caution when washing and drying laundry: Don't shake dirty laundry, and use the warmest water setting that you can.  o For more tips, go to www.cdc.gov/coronavirus/2019-ncov/downloads/10Things.pdf.    4.Sign up for [employer]. We know it's scary to hear that you might have COVID-19. We want to track your symptoms to make sure you're okay over the next 2 weeks. Please look for an email from [employer]---this is a free, online program that we'll use to keep in touch. To sign up, follow the link in the email. Learn more at [email or web address]  How can I take care of myself?   Get lots of rest. " Drink extra fluids (unless a doctor has told you not to).   Take Tylenol (acetaminophen) for fever or pain. If you have liver or kidney problems, ask your family doctor if it's okay to take Tylenol.   Adults can take either:    650 mg (two 325 mg pills) every 4 to 6 hours, or...   1,000 mg (two 500 mg pills) every 8 hours as needed.    Note: Don't take more than 3,000 mg in one day. Acetaminophen is found in many medicines (both prescribed and over-the-counter medicines). Read all labels to be sure you don't take too much.   For children, check the Tylenol bottle for the right dose. The dose is based on the child's age or weight.    If you have other health problems (like cancer, heart failure, an organ transplant or severe kidney disease): Call your specialty clinic if you don't feel better in the next 2 days.       Know when to call 911. Emergency warning signs include:    Trouble breathing or shortness of breath Pain or pressure in the chest that doesn't go away Feeling confused like you haven't felt before, or not being able to wake up Bluish-colored lips or face.  Where can I get more information?   [facility] -- About COVID-19: [email or web address]   CDC -- What to Do If You're Sick: www.cdc.gov/coronavirus/2019-ncov/about/steps-when-sick.html   CDC -- Ending Home Isolation: www.cdc.gov/coronavirus/2019-ncov/hcp/disposition-in-home-patients.html   CDC -- Caring for Someone: www.cdc.gov/coronavirus/2019-ncov/if-you-are-sick/care-for-someone.html   [facility] -- Interim Guidance for Hospital Discharge to Home: www.health.[facility].[place].us/diseases/coronavirus/hcp/hospdischarge.pdf   [facility] clinical trials (COVID-19 research studies): clinicalaffairs.[facility]/umn-clinical-trials    Below are the COVID-19 hotlines at the [place] Department of Health ([facility]). Interpreters are available.    For health questions: Call 259-119-0124 or 1-297.863.6884 (7 a.m. to 7 p.m.) For questions about schools  and childcare: Call 516-051-7443 or 1-166.488.5902 (7 a.m. to 7 p.m.)    Diagnosis: Worries  Diagnosis ICD: R45.82

## 2020-12-14 ENCOUNTER — VIRTUAL VISIT (OUTPATIENT)
Dept: FAMILY MEDICINE | Facility: CLINIC | Age: 25
End: 2020-12-14
Payer: COMMERCIAL

## 2020-12-14 DIAGNOSIS — F41.8 DEPRESSION WITH ANXIETY: ICD-10-CM

## 2020-12-14 PROCEDURE — 99213 OFFICE O/P EST LOW 20 MIN: CPT | Mod: 95 | Performed by: PHYSICIAN ASSISTANT

## 2020-12-14 RX ORDER — SERTRALINE HYDROCHLORIDE 100 MG/1
200 TABLET, FILM COATED ORAL DAILY
Qty: 180 TABLET | Refills: 1 | Status: SHIPPED | OUTPATIENT
Start: 2020-12-14 | End: 2021-10-14

## 2020-12-14 ASSESSMENT — ANXIETY QUESTIONNAIRES
1. FEELING NERVOUS, ANXIOUS, OR ON EDGE: MORE THAN HALF THE DAYS
2. NOT BEING ABLE TO STOP OR CONTROL WORRYING: SEVERAL DAYS
5. BEING SO RESTLESS THAT IT IS HARD TO SIT STILL: SEVERAL DAYS
7. FEELING AFRAID AS IF SOMETHING AWFUL MIGHT HAPPEN: SEVERAL DAYS
6. BECOMING EASILY ANNOYED OR IRRITABLE: MORE THAN HALF THE DAYS
GAD7 TOTAL SCORE: 11
3. WORRYING TOO MUCH ABOUT DIFFERENT THINGS: MORE THAN HALF THE DAYS

## 2020-12-14 ASSESSMENT — PATIENT HEALTH QUESTIONNAIRE - PHQ9
SUM OF ALL RESPONSES TO PHQ QUESTIONS 1-9: 7
5. POOR APPETITE OR OVEREATING: MORE THAN HALF THE DAYS

## 2020-12-14 NOTE — PROGRESS NOTES
"Meme Mccurdy is a 25 year old female who is being evaluated via a billable video visit.      The patient has been notified of following:     \"This video visit will be conducted via a call between you and your physician/provider. We have found that certain health care needs can be provided without the need for an in-person physical exam.  This service lets us provide the care you need with a video conversation.  If a prescription is necessary we can send it directly to your pharmacy.  If lab work is needed we can place an order for that and you can then stop by our lab to have the test done at a later time.    Video visits are billed at different rates depending on your insurance coverage.  Please reach out to your insurance provider with any questions.    If during the course of the call the physician/provider feels a video visit is not appropriate, you will not be charged for this service.\"    Patient has given verbal consent for Video visit? Yes  How would you like to obtain your AVS? MyChart  If you are dropped from the video visit, the video invite should be resent to: Send to e-mail at: [email or web address]  Will anyone else be joining your video visit? No    Subjective     Meme Mccurdy is a 25 year old female who presents today via video visit for the following health issues:    She has had a stressful semester with school.     HPI     Depression and Anxiety Follow-Up    How are you doing with your depression since your last visit? good    How are you doing with your anxiety since your last visit?  good    Are you having other symptoms that might be associated with depression or anxiety? No    Have you had a significant life event? No     Do you have any concerns with your use of alcohol or other drugs? No    Social History     Tobacco Use     Smoking status: Never Smoker     Smokeless tobacco: Never Used   Substance Use Topics     Alcohol use: No     Drug use: No     PHQ 10/9/2020 11/19/2020 " 12/14/2020   PHQ-9 Total Score 16 10 7   Q9: Thoughts of better off dead/self-harm past 2 weeks Not at all Not at all Not at all     AVRIL-7 SCORE 10/9/2020 11/19/2020 12/14/2020   Total Score - - -   Total Score - 11 (moderate anxiety) -   Total Score 14 11 11     Last PHQ-9 12/14/2020   1.  Little interest or pleasure in doing things 1   2.  Feeling down, depressed, or hopeless 1   3.  Trouble falling or staying asleep, or sleeping too much 2   4.  Feeling tired or having little energy 1   5.  Poor appetite or overeating 1   6.  Feeling bad about yourself 0   7.  Trouble concentrating 1   8.  Moving slowly or restless 0   Q9: Thoughts of better off dead/self-harm past 2 weeks 0   PHQ-9 Total Score 7   Difficulty at work, home, or with people -     AVRIL-7  12/14/2020   1. Feeling nervous, anxious, or on edge 2   2. Not being able to stop or control worrying 1   3. Worrying too much about different things 2   4. Trouble relaxing 2   5. Being so restless that it is hard to sit still 1   6. Becoming easily annoyed or irritable 2   7. Feeling afraid, as if something awful might happen 1   AVRIL-7 Total Score 11   If you checked any problems, how difficult have they made it for you to do your work, take care of things at home, or get along with other people? -       Suicide Assessment Five-step Evaluation and Treatment (SAFE-T)      How many days per week do you miss taking your medication? sometime         Video Start Time: 10:38 AM        Review of Systems   Constitutional, HEENT, cardiovascular, pulmonary, GI, , musculoskeletal, neuro, skin, endocrine and psych systems are negative, except as otherwise noted.      Objective           Vitals:  No vitals were obtained today due to virtual visit.    Physical Exam     GENERAL: Healthy, alert and no distress  EYES: Eyes grossly normal to inspection.  No discharge or erythema, or obvious scleral/conjunctival abnormalities.  RESP: No audible wheeze, cough, or visible cyanosis.   No visible retractions or increased work of breathing.    SKIN: Visible skin clear. No significant rash, abnormal pigmentation or lesions.  NEURO: Cranial nerves grossly intact.  Mentation and speech appropriate for age.  PSYCH: Mentation appears normal, affect normal/bright, judgement and insight intact, normal speech and appearance well-groomed.              Assessment & Plan     Depression with anxiety  She is not needing the hydroxyzine.   She continues to take the sertraline.   She had a tough semester in college and having more anxiety related to her classwork and her job. Now that her last final will be over, she is doing well. GAD7 reflects her temporary increase in anxiety symptoms. She will continue with the same dose of the sertraline at 200 mg daily.   Recheck in 6 months with virtual video visit, sooner if needed.   - sertraline (ZOLOFT) 100 MG tablet; Take 2 tablets (200 mg) by mouth daily            Return in about 6 months (around 6/14/2021) for depression / anxiety, virtual video visit.    Kristen M. Kehr, PA-C  [facility]      Video-Visit Details    Type of service:  Video Visit    Video End Time:10:47 AM    Originating Location (pt. Location): Home    Distant Location (provider location):  [facility]     Platform used for Video Visit: 3seventy

## 2020-12-15 ENCOUNTER — ALLIED HEALTH/NURSE VISIT (OUTPATIENT)
Dept: NURSING | Facility: CLINIC | Age: 25
End: 2020-12-15
Payer: COMMERCIAL

## 2020-12-15 DIAGNOSIS — Z30.42 DEPO-PROVERA CONTRACEPTIVE STATUS: Primary | ICD-10-CM

## 2020-12-15 PROCEDURE — 96372 THER/PROPH/DIAG INJ SC/IM: CPT

## 2020-12-15 RX ADMIN — MEDROXYPROGESTERONE ACETATE 150 MG: 150 INJECTION, SUSPENSION INTRAMUSCULAR at 12:09

## 2020-12-15 ASSESSMENT — ANXIETY QUESTIONNAIRES: GAD7 TOTAL SCORE: 11

## 2020-12-15 NOTE — NURSING NOTE
BP: Data Unavailable    LAST PAP/EXAM:   Lab Results   Component Value Date    PAP NIL 03/19/2018     URINE HCG:not indicated    The following medication was given:     MEDICATION: Depo Provera 150mg  ROUTE: IM  SITE: Ventrogluteal - Left  : SocialGO  LOT #: [id number]  EXP:06/2022  NEXT INJECTION DUE: 3/2/21 - 3/16/21 Per Leap Year Calendar patient is to return back March 3- March 17, 2021  Provider: Kristen Kehr Sandy Thao MA

## 2021-03-11 ENCOUNTER — ALLIED HEALTH/NURSE VISIT (OUTPATIENT)
Dept: NURSING | Facility: CLINIC | Age: 26
End: 2021-03-11
Payer: COMMERCIAL

## 2021-03-11 VITALS
BODY MASS INDEX: 43.7 KG/M2 | SYSTOLIC BLOOD PRESSURE: 115 MMHG | DIASTOLIC BLOOD PRESSURE: 79 MMHG | WEIGHT: 279 LBS | HEART RATE: 93 BPM

## 2021-03-11 DIAGNOSIS — Z30.9 CONTRACEPTIVE MANAGEMENT: Primary | ICD-10-CM

## 2021-03-11 PROCEDURE — 96372 THER/PROPH/DIAG INJ SC/IM: CPT

## 2021-03-11 RX ADMIN — MEDROXYPROGESTERONE ACETATE 150 MG: 150 INJECTION, SUSPENSION INTRAMUSCULAR at 14:05

## 2021-03-11 NOTE — PROGRESS NOTES
BP: Data Unavailable    LAST PAP/EXAM:   Lab Results   Component Value Date    PAP NIL 03/19/2018     URINE HCG:not indicated    The following medication was given:     MEDICATION: Depo Provera 150mg  ROUTE: IM  SITE: Ventrogluteal - Right  : amphastar  LOT #: [id number]  EXP:8/2022  NEXT INJECTION DUE: 5/27/21 - 6/10/21   Provider: kehr, kristen Clinic Administered Medication Documentation      Depo Provera Documentation    URINE HCG: not indicated    Depo-Provera Standing Order inclusion/exclusion criteria reviewed.   Patient meets: inclusion criteria     BP: 115/79  LAST PAP/EXAM:   Lab Results   Component Value Date    PAP NIL 03/19/2018       Prior to injection, verified patient identity using patient's name and date of birth. Medication was administered. Please see MAR and medication order for additional information.     Was entire vial of medication used? Yes  Vial/Syringe: Single dose vial  Expiration Date:  8/2022    Patient instructed to report any adverse reaction to staff immediately .  NEXT INJECTION DUE: 5/27/21 - 6/10/21        
Airway patent, TM normal bilaterally, normal appearing mouth, nose, throat, neck supple with full range of motion, no cervical adenopathy.

## 2021-03-23 NOTE — PROGRESS NOTES
SUBJECTIVE:   CC: Meme Mccurdy is an 26 year old woman who presents for preventive health visit.       Patient has been advised of split billing requirements and indicates understanding: Yes  Healthy Habits:     Getting at least 3 servings of Calcium per day:  Yes    Bi-annual eye exam:  Yes    Dental care twice a year:  Yes    Sleep apnea or symptoms of sleep apnea:  None    Diet:  Regular (no restrictions)    Frequency of exercise:  2-3 days/week    Duration of exercise:  45-60 minutes    Taking medications regularly:  Yes    Medication side effects:  None    PHQ-2 Total Score: 4    Additional concerns today:  No              Today's PHQ-2 Score:   PHQ-2 ( 1999 [employer]) 3/25/2021   Q1: Little interest or pleasure in doing things 2   Q2: Feeling down, depressed or hopeless 2   PHQ-2 Score 4   Q1: Little interest or pleasure in doing things More than half the days   Q2: Feeling down, depressed or hopeless More than half the days   PHQ-2 Score 4       Abuse: Current or Past (Physical, Sexual or Emotional) - No  Do you feel safe in your environment? Yes    Have you ever done Advance Care Planning? (For example, a Health Directive, POLST, or a discussion with a medical provider or your loved ones about your wishes): declines information at todays visit     Social History     Tobacco Use     Smoking status: Never Smoker     Smokeless tobacco: Never Used   Substance Use Topics     Alcohol use: No         Alcohol Use 3/25/2021   Prescreen: >3 drinks/day or >7 drinks/week? No   Prescreen: >3 drinks/day or >7 drinks/week? -         Reviewed orders with patient.  Reviewed health maintenance and updated orders accordingly - Yes  BP Readings from Last 3 Encounters:   03/25/21 129/85   03/11/21 115/79   10/09/20 114/80    Wt Readings from Last 3 Encounters:   03/25/21 127.9 kg (282 lb)   03/11/21 126.6 kg (279 lb)   10/09/20 125.6 kg (277 lb)                  Patient Active Problem List   Diagnosis     Melanocytic nevus of  face     Ovarian cyst     Depression with anxiety     Insomnia     Encounter for initial prescription of injectable contraceptive     Morbid obesity (H)     Past Surgical History:   Procedure Laterality Date     HC TOOTH EXTRACTION W/FORCEP  9/08     TONSILLECTOMY Bilateral 12/19/2016    Procedure: TONSILLECTOMY;  Surgeon: Ilya Montilla MD;  Location:  OR       Social History     Tobacco Use     Smoking status: Never Smoker     Smokeless tobacco: Never Used   Substance Use Topics     Alcohol use: No     Family History   Problem Relation Age of Onset     Hypertension Mother      Arthritis Mother      Lipids Father         High Cholesterol     Breast Cancer Maternal Grandmother      Cancer Maternal Grandmother      Diabetes Maternal Grandfather      Hypertension Maternal Grandfather      Cerebrovascular Disease Other      Thyroid Disease No family hx of      Glaucoma No family hx of      Macular Degeneration No family hx of          Current Outpatient Medications   Medication Sig Dispense Refill     cyclobenzaprine (FLEXERIL) 10 MG tablet Take 1 tablet (10 mg) by mouth nightly as needed for muscle spasms 30 tablet 0     hydrOXYzine (ATARAX) 25 MG tablet Take 2-3 tablets (50-75 mg) by mouth nightly as needed for other (sleep) 60 tablet 1     ibuprofen (ADVIL,MOTRIN) 800 MG tablet Take 1 tablet by mouth every 8 hours as needed for pain. 60 tablet 1     naproxen (NAPROSYN) 500 MG tablet Take 1 tablet (500 mg) by mouth 2 times daily (with meals) 30 tablet 1     sertraline (ZOLOFT) 100 MG tablet Take 2 tablets (200 mg) by mouth daily 180 tablet 1     Allergies   Allergen Reactions     Nkda [No Known Drug Allergies]            History of abnormal Pap smear:   NO - age 21-29 PAP every 3 years recommended  Last 3 Pap and HPV Results:   PAP / HPV 3/19/2018   PAP NIL     PAP / HPV 3/19/2018   PAP NIL     Reviewed and updated as needed this visit by clinical staff  Tobacco  Allergies  Meds  Problems  Med Hx  Surg Hx  " Fam Hx  Soc Hx          Reviewed and updated as needed this visit by Provider  Tobacco  Allergies  Meds  Problems  Med Hx  Surg Hx  Fam Hx         Past Medical History:   Diagnosis Date     Allergic rhinitis      Allergies     dog     BMI (body mass index), pediatric, 85% to less than 95% for age 1/3/2013     Chronic GERD      Depressive disorder 3/2015     Eczema      Melanocytic nevus of face 12/2009     Melanocytic nevus of face       Past Surgical History:   Procedure Laterality Date     HC TOOTH EXTRACTION W/FORCEP  9/08     TONSILLECTOMY Bilateral 12/19/2016    Procedure: TONSILLECTOMY;  Surgeon: Ilya Montilla MD;  Location:  OR       Review of Systems   Constitutional: Negative for chills and fever.   HENT: Negative for congestion, ear pain, hearing loss and sore throat.    Eyes: Negative for pain and visual disturbance.   Respiratory: Negative for cough and shortness of breath.    Cardiovascular: Negative for chest pain, palpitations and peripheral edema.   Gastrointestinal: Positive for nausea. Negative for abdominal pain, constipation, diarrhea, heartburn and hematochezia.   Breasts:  Negative for tenderness, breast mass and discharge.   Genitourinary: Negative for dysuria, frequency, genital sores, hematuria, pelvic pain, urgency, vaginal bleeding and vaginal discharge.   Musculoskeletal: Positive for myalgias. Negative for arthralgias and joint swelling.   Skin: Negative for rash.   Neurological: Positive for headaches. Negative for dizziness, weakness and paresthesias.   Psychiatric/Behavioral: Positive for mood changes. The patient is nervous/anxious.           OBJECTIVE:   /85   Pulse 98   Temp 98.3  F (36.8  C) (Oral)   Resp 18   Ht 1.676 m (5' 6\")   Wt 127.9 kg (282 lb)   BMI 45.52 kg/m    Physical Exam  GENERAL: healthy, alert and no distress  EYES: Eyes grossly normal to inspection, PERRL and conjunctivae and sclerae normal  HENT: ear canals and TM's normal, nose and " "mouth without ulcers or lesions  NECK: no adenopathy, no asymmetry, masses, or scars and thyroid normal to palpation  RESP: lungs clear to auscultation - no rales, rhonchi or wheezes  BREAST: normal without masses, tenderness or nipple discharge and no palpable axillary masses or adenopathy  CV: regular rate and rhythm, normal S1 S2, no S3 or S4, no murmur, click or rub, no peripheral edema and peripheral pulses strong  ABDOMEN: soft, nontender, no hepatosplenomegaly, no masses and bowel sounds normal   (female): normal female external genitalia, normal urethral meatus, vaginal mucosa pink, moist, well rugated, and normal cervix/adnexa/uterus without masses or discharge  MS: no gross musculoskeletal defects noted, no edema  SKIN: no suspicious lesions or rashes  NEURO: Normal strength and tone, mentation intact and speech normal  PSYCH: mentation appears normal, affect normal/bright        ASSESSMENT/PLAN:   1. Routine general medical examination at a health care facility  Health maintenance reviewed and updated.    2. Need for vaccination  - TDAP VACCINE (Adacel, Boostrix)  [[id number]]    3. Morbid obesity (H)  She recently started working with a  and will also work on healthy eating habits.     4. Screening for malignant neoplasm of cervix  - Pap imaged thin layer screen only - recommended age 21 - 24 years    5. Special screening examination for chlamydial disease  - CHLAMYDIA TRACHOMATIS PCR    Patient has been advised of split billing requirements and indicates understanding: Yes  COUNSELING:  Reviewed preventive health counseling, as reflected in patient instructions       Regular exercise       Healthy diet/nutrition    Estimated body mass index is 45.52 kg/m  as calculated from the following:    Height as of this encounter: 1.676 m (5' 6\").    Weight as of this encounter: 127.9 kg (282 lb).    Weight management plan: Discussed healthy diet and exercise guidelines    She reports that she has never " smoked. She has never used smokeless tobacco.      Counseling Resources:  ATP IV Guidelines  Pooled Cohorts Equation Calculator  Breast Cancer Risk Calculator  BRCA-Related Cancer Risk Assessment: FHS-7 Tool  FRAX Risk Assessment  ICSI Preventive Guidelines  Dietary Guidelines for Americans, 2010  USDA's MyPlate  ASA Prophylaxis  Lung CA Screening    Kristen M. Kehr, PA-C [facility]

## 2021-03-24 ASSESSMENT — PATIENT HEALTH QUESTIONNAIRE - PHQ9: SUM OF ALL RESPONSES TO PHQ QUESTIONS 1-9: 10

## 2021-03-25 ENCOUNTER — OFFICE VISIT (OUTPATIENT)
Dept: FAMILY MEDICINE | Facility: CLINIC | Age: 26
End: 2021-03-25
Payer: COMMERCIAL

## 2021-03-25 VITALS
HEIGHT: 66 IN | RESPIRATION RATE: 18 BRPM | SYSTOLIC BLOOD PRESSURE: 129 MMHG | WEIGHT: 282 LBS | HEART RATE: 98 BPM | DIASTOLIC BLOOD PRESSURE: 85 MMHG | TEMPERATURE: 98.3 F | BODY MASS INDEX: 45.32 KG/M2

## 2021-03-25 DIAGNOSIS — E66.01 MORBID OBESITY (H): ICD-10-CM

## 2021-03-25 DIAGNOSIS — Z12.4 SCREENING FOR MALIGNANT NEOPLASM OF CERVIX: ICD-10-CM

## 2021-03-25 DIAGNOSIS — Z00.00 ROUTINE GENERAL MEDICAL EXAMINATION AT A HEALTH CARE FACILITY: Primary | ICD-10-CM

## 2021-03-25 DIAGNOSIS — Z23 NEED FOR VACCINATION: ICD-10-CM

## 2021-03-25 DIAGNOSIS — Z11.8 SPECIAL SCREENING EXAMINATION FOR CHLAMYDIAL DISEASE: ICD-10-CM

## 2021-03-25 PROCEDURE — 99395 PREV VISIT EST AGE 18-39: CPT | Mod: 25 | Performed by: PHYSICIAN ASSISTANT

## 2021-03-25 PROCEDURE — 87491 CHLMYD TRACH DNA AMP PROBE: CPT | Performed by: PHYSICIAN ASSISTANT

## 2021-03-25 PROCEDURE — 90471 IMMUNIZATION ADMIN: CPT | Performed by: PHYSICIAN ASSISTANT

## 2021-03-25 PROCEDURE — G0145 SCR C/V CYTO,THINLAYER,RESCR: HCPCS | Performed by: PHYSICIAN ASSISTANT

## 2021-03-25 PROCEDURE — 90715 TDAP VACCINE 7 YRS/> IM: CPT | Performed by: PHYSICIAN ASSISTANT

## 2021-03-25 ASSESSMENT — PATIENT HEALTH QUESTIONNAIRE - PHQ9
SUM OF ALL RESPONSES TO PHQ QUESTIONS 1-9: 10
10. IF YOU CHECKED OFF ANY PROBLEMS, HOW DIFFICULT HAVE THESE PROBLEMS MADE IT FOR YOU TO DO YOUR WORK, TAKE CARE OF THINGS AT HOME, OR GET ALONG WITH OTHER PEOPLE: VERY DIFFICULT
SUM OF ALL RESPONSES TO PHQ QUESTIONS 1-9: 10

## 2021-03-25 ASSESSMENT — ENCOUNTER SYMPTOMS
EYE PAIN: 0
JOINT SWELLING: 0
MYALGIAS: 1
HEMATURIA: 0
HEADACHES: 1
HEMATOCHEZIA: 0
CHILLS: 0
ABDOMINAL PAIN: 0
WEAKNESS: 0
DYSURIA: 0
BREAST MASS: 0
DIZZINESS: 0
DIARRHEA: 0
PALPITATIONS: 0
FREQUENCY: 0
FEVER: 0
HEARTBURN: 0
PARESTHESIAS: 0
SORE THROAT: 0
NERVOUS/ANXIOUS: 1
ARTHRALGIAS: 0
CONSTIPATION: 0
COUGH: 0
SHORTNESS OF BREATH: 0
NAUSEA: 1

## 2021-03-25 ASSESSMENT — MIFFLIN-ST. JEOR: SCORE: 2045.89

## 2021-03-25 NOTE — NURSING NOTE
"Chief Complaint   Patient presents with     Physical       Initial /85   Pulse 98   Temp 98.3  F (36.8  C) (Oral)   Resp 18   Ht 1.676 m (5' 6\")   Wt 127.9 kg (282 lb)   BMI 45.52 kg/m   Estimated body mass index is 45.52 kg/m  as calculated from the following:    Height as of this encounter: 1.676 m (5' 6\").    Weight as of this encounter: 127.9 kg (282 lb).  Medication Reconciliation: complete  Jerry Burns CMA    "

## 2021-03-26 LAB
C TRACH DNA SPEC QL NAA+PROBE: NEGATIVE
SPECIMEN SOURCE: NORMAL

## 2021-03-29 LAB
COPATH REPORT: NORMAL
PAP: NORMAL

## 2021-05-12 NOTE — PROGRESS NOTES
Hub staff attempted to follow warm transfer process and was unsuccessful     Caller: Mirlande Landa    Relationship to patient: Self    Best call back number: [phone]    Patient is needing: PATIENT IS RETURNING A CALL. THE HUB COULD NOT RELAY THE LAST MESSAGE TO THE PATIENT. PLEASE ADVISE.    Discharge Note    Progress reporting period is from initial eval to Oct 22, 2018.     Meme failed to return for next follow up visit and current status is unknown.  Please see information below for last relevant information on current status.  Patient seen for Rxs Used: 2 visits.    SUBJECTIVE  Subjective changes noted by patient:  Subjective: Patient reported that her shoulder is doing ok and that she has been performing her exercises consistently. .  Current pain level is Current Pain level: 7/10.     Previous pain level was  Initial Pain level: 5/10.   Changes in function:  Yes (See Goal flowsheet attached for changes in current functional level)  Adverse reaction to treatment or activity: None    OBJECTIVE  Changes noted in objective findings: Objective: Shoulder ROM: Flexion 150, abduction 155. pain level 6/10 at end range. Reduction of pain with distraction/MWM. .    ASSESSMENT/PLAN  Diagnosis: Right shoulder pain   DIAGP:  The encounter diagnosis was Chronic right shoulder pain.  Updated problem list and treatment plan:       STG/LTGs have been met or progress has been made towards goals:  Yes, please see goal flowsheet for most current information.    Assessment of Progress: current status is unknown.  Last current status:  .  Self Management Plans:  HEP    I have re-evaluated this patient and find that the nature, scope, duration and intensity of the therapy is appropriate for the medical condition of the patient.  Meme continues to require the following intervention to meet STG and LTG's:  HEP.    Recommendations:  Discharge with current home program.  Patient to follow up with MD as needed. Episode to be closed at this time and patient formally discharged from therapy.    Noel Huddleston D.P.T., CSCS

## 2021-06-03 ENCOUNTER — ALLIED HEALTH/NURSE VISIT (OUTPATIENT)
Dept: NURSING | Facility: CLINIC | Age: 26
End: 2021-06-03
Payer: COMMERCIAL

## 2021-06-03 ENCOUNTER — TELEPHONE (OUTPATIENT)
Dept: FAMILY MEDICINE | Facility: CLINIC | Age: 26
End: 2021-06-03

## 2021-06-03 DIAGNOSIS — Z30.9 CONTRACEPTIVE MANAGEMENT: Primary | ICD-10-CM

## 2021-06-03 DIAGNOSIS — Z30.42 SURVEILLANCE OF CONTRACEPTIVE INJECTION: Primary | ICD-10-CM

## 2021-06-03 DIAGNOSIS — Z30.42 SURVEILLANCE FOR INJECTABLE MEDROXYPROGESTERONE/ESTRADIOL: ICD-10-CM

## 2021-06-03 PROCEDURE — 96372 THER/PROPH/DIAG INJ SC/IM: CPT

## 2021-06-03 PROCEDURE — 99207 PR NO CHARGE LOS: CPT

## 2021-06-03 RX ORDER — MEDROXYPROGESTERONE ACETATE 150 MG/ML
150 INJECTION, SUSPENSION INTRAMUSCULAR
Status: COMPLETED | OUTPATIENT
Start: 2021-06-03 | End: 2022-02-14

## 2021-06-03 RX ADMIN — MEDROXYPROGESTERONE ACETATE 150 MG: 150 INJECTION, SUSPENSION INTRAMUSCULAR at 11:01

## 2021-06-03 NOTE — PROGRESS NOTES
Clinic Administered Medication Documentation      Depo Provera Documentation    URINE HCG: not indicated    Depo-Provera Standing Order inclusion/exclusion criteria reviewed.   Patient meets: inclusion criteria     BP: Data Unavailable  LAST PAP/EXAM:   Lab Results   Component Value Date    PAP NIL 03/25/2021       Prior to injection, verified patient identity using patient's name and date of birth. Medication was administered. Please see MAR and medication order for additional information.     Was entire vial of medication used? Yes  Vial/Syringe: Single dose vial  Expiration Date:  01/2023    Patient instructed to stay in clinic after the injection but patient declined.  NEXT INJECTION DUE: 8/19/21 - 9/2/21

## 2021-06-03 NOTE — TELEPHONE ENCOUNTER
Pt coming for depo today. Was just seen in March for physical. Are you okay with reordering her Depo since it has . Her next injection is due NEXT INJECTION DUE: 21 - 6/10/21   Smiley Biggs CMA

## 2021-06-04 ENCOUNTER — E-VISIT (OUTPATIENT)
Dept: FAMILY MEDICINE | Facility: CLINIC | Age: 26
End: 2021-06-04
Payer: COMMERCIAL

## 2021-06-04 DIAGNOSIS — R05.9 COUGH: Primary | ICD-10-CM

## 2021-06-04 PROCEDURE — 99422 OL DIG E/M SVC 11-20 MIN: CPT | Performed by: PHYSICIAN ASSISTANT

## 2021-06-06 RX ORDER — PREDNISONE 20 MG/1
TABLET ORAL
Qty: 10 TABLET | Refills: 0 | Status: SHIPPED | OUTPATIENT
Start: 2021-06-06 | End: 2021-09-24

## 2021-06-06 RX ORDER — ALBUTEROL SULFATE 90 UG/1
2 AEROSOL, METERED RESPIRATORY (INHALATION) EVERY 6 HOURS
Qty: 18 G | Refills: 1 | Status: SHIPPED | OUTPATIENT
Start: 2021-06-06 | End: 2024-04-03

## 2021-09-01 ENCOUNTER — ALLIED HEALTH/NURSE VISIT (OUTPATIENT)
Dept: FAMILY MEDICINE | Facility: CLINIC | Age: 26
End: 2021-09-01
Payer: COMMERCIAL

## 2021-09-01 DIAGNOSIS — Z30.42 ENCOUNTER FOR SURVEILLANCE OF INJECTABLE CONTRACEPTIVE: Primary | ICD-10-CM

## 2021-09-01 PROCEDURE — 99207 PR NO CHARGE NURSE ONLY: CPT

## 2021-09-01 PROCEDURE — 96372 THER/PROPH/DIAG INJ SC/IM: CPT | Performed by: PHYSICIAN ASSISTANT

## 2021-09-01 RX ADMIN — MEDROXYPROGESTERONE ACETATE 150 MG: 150 INJECTION, SUSPENSION INTRAMUSCULAR at 11:03

## 2021-09-01 NOTE — PROGRESS NOTES
Clinic Administered Medication Documentation    Administrations This Visit     medroxyPROGESTERone (DEPO-PROVERA) injection 150 mg     Admin Date  09/01/2021 Action  Given Dose  150 mg Route  Intramuscular Site  Left Gluteus Dewayne Administered By  Carin Higgins CMA    Ordering Provider: Kehr, Kristen M, PA-C    Patient Supplied?: No                  Depo Provera Documentation    URINE HCG: not indicated    Depo-Provera Standing Order inclusion/exclusion criteria reviewed.   Patient meets: inclusion criteria     BP: Data Unavailable  LAST PAP/EXAM:   Lab Results   Component Value Date    PAP NIL 03/25/2021       Prior to injection, verified patient identity using patient's name and date of birth. Medication was administered. Please see MAR and medication order for additional information.     Was entire vial of medication used? Yes  Vial/Syringe: Single dose vial  Expiration Date:  01/2023    Patient instructed to stay in clinic after the injection but patient declined.  NEXT INJECTION DUE: 11/17/21 - 12/1/21

## 2021-09-24 ENCOUNTER — OFFICE VISIT (OUTPATIENT)
Dept: URGENT CARE | Facility: URGENT CARE | Age: 26
End: 2021-09-24
Payer: COMMERCIAL

## 2021-09-24 VITALS
BODY MASS INDEX: 41.64 KG/M2 | SYSTOLIC BLOOD PRESSURE: 113 MMHG | TEMPERATURE: 98.7 F | OXYGEN SATURATION: 97 % | DIASTOLIC BLOOD PRESSURE: 74 MMHG | WEIGHT: 258 LBS | HEART RATE: 80 BPM

## 2021-09-24 DIAGNOSIS — Z87.09 HISTORY OF ASTHMA: ICD-10-CM

## 2021-09-24 DIAGNOSIS — J20.9 ACUTE BRONCHITIS, UNSPECIFIED ORGANISM: ICD-10-CM

## 2021-09-24 DIAGNOSIS — E66.01 MORBID OBESITY (H): Primary | ICD-10-CM

## 2021-09-24 PROBLEM — J45.909 ASTHMA: Status: ACTIVE | Noted: 2021-09-24

## 2021-09-24 PROCEDURE — 99213 OFFICE O/P EST LOW 20 MIN: CPT | Performed by: PHYSICIAN ASSISTANT

## 2021-09-24 ASSESSMENT — ENCOUNTER SYMPTOMS
DIARRHEA: 0
WHEEZING: 0
FEVER: 0
NAUSEA: 0
COUGH: 1
APPETITE CHANGE: 0
HEADACHES: 1
SHORTNESS OF BREATH: 1
RHINORRHEA: 1
DIAPHORESIS: 0
VOMITING: 0
CHILLS: 0
SORE THROAT: 1
MYALGIAS: 0
CHEST TIGHTNESS: 1

## 2021-09-24 NOTE — PATIENT INSTRUCTIONS
Patient Education     Acute Bronchitis  Your healthcare provider has told you that you have acute bronchitis. Bronchitis is infection or inflammation of the airways in the lungs (bronchial tubes). Normally, air moves easily in and out of the airways. Bronchitis narrows the airways. This makes it harder for air to flow in and out of the lungs. This causes symptoms such as shortness of breath, coughing up yellow or green mucus, and wheezing.  Bronchitis can be acute or chronic. Acute means it happens quickly and goes away in a short time. Chronic means a condition lasts a long time and often comes back. Most people with acute bronchitis get better in 1 to 2 weeks.     What causes acute bronchitis?  Acute bronchitis is often caused by a virus such as a cold or the flu. In some cases, it may be caused by bacteria. Certain factors make it more likely for a cold or flu to turn into bronchitis. These include being very young, being elderly, having a heart or lung problem, or having a weak immune system. Cigarette smoking also makes bronchitis more likely.  When bronchitis develops, the airways become swollen. The airways may also become infected with bacteria. This is known as a secondary infection.  Symptoms of acute bronchitis  Symptoms can include:    Coughing with mucus    Wheezing    Feeling short of breath    Chest pain    Fever  Diagnosing acute bronchitis  Your healthcare provider will ask about your symptoms and health history. He or she will give you a physical exam. This will include listening to your lungs while you breathe. You may have a chest X-ray to look for infection in the lungs (pneumonia) if you have had a fever. You may also have blood tests to check for infection.  Treating acute bronchitis  Bronchitis usually goes away in 1 to 2 weeks without treatment. You can help feel better by:    Taking medicine as directed. Talk to your healthcare provider before taking any over-the-counter medicines (OTC).  Some OTC medicines help relieve inflammation in your bronchial tubes. They can also thin mucus. This makes it easier to cough up. Your healthcare provider may prescribe an inhaler to help open up the bronchial tubes. Most of the time, acute bronchitis is caused by a viral infection. Antibiotics are usually not prescribed for viral infections.    Drinking plenty of fluids, such as water, juice, or warm soup. Fluids loosen mucus so that you can cough it up. This helps you breathe more easily. Fluids also prevent dehydration.    Using a humidifier. This can help reduce coughing.    Getting plenty of rest    Not smoking. Also, don't let anyone else smoke in your home. In public places, move away from secondhand smoke.  Recovery and follow-up  Follow up with your doctor. You will likely feel better in 1 to 2 weeks. But you may have a dry cough for a longer time. Let your doctor know if you still have symptoms other than a dry cough after 2 weeks. Tell him or her if you get bronchial infections often.  Self-care tips  To get relief from your symptoms and prevent bronchitis:    Stop smoking. Stopping smoking is the most important step you can take to treat bronchitis. If you need help stopping smoking, talk with your healthcare provider.    Stay away from secondhand smoke and other irritants. Try to stay away from smoke, chemicals, fumes, and dust. Don t let anyone smoke in your home. Stay indoors on smoggy days.    Prevent lung infections. Ask your healthcare provider about the flu and pneumonia vaccines. Take steps to prevent colds and other lung infections.    Wash your hands well. Wash your hands often with soap and water. Use hand  when you can t wash your hands. Stay away from crowds during cold and flu season.  When to call your healthcare provider  Call the healthcare provider if you have any of these:    Fever of 100.4 F ( 38.0 C) or higher, or as directed by your healthcare provider    Symptoms that get  worse, or new symptoms    Breathing not getting better with treatments    Symptoms that don t start to get better in 1 week  [employer] last reviewed this educational content on 8/1/2019 2000-2021 [employer]. All rights reserved. This information is not intended as a substitute for professional medical care. Always follow your healthcare professional's instructions.

## 2021-10-13 ENCOUNTER — MYC MEDICAL ADVICE (OUTPATIENT)
Dept: FAMILY MEDICINE | Facility: CLINIC | Age: 26
End: 2021-10-13

## 2021-10-13 DIAGNOSIS — F41.8 DEPRESSION WITH ANXIETY: ICD-10-CM

## 2021-10-13 NOTE — LETTER
October 15, 2021    Meme Mccurdy  [address] [id number]    Dear Meme,       We recently received a refill request for sertraline (ZOLOFT) 100 MG tablet.  We have refilled this for a one time 30 day supply only because you are due for a:    Depression/medication check office visit-this can be a video visit.      Please call at your earliest convenience so that there will not be a delay with your future refills.          Thank you,   Your [facility] Team/  [phone]

## 2021-10-14 RX ORDER — SERTRALINE HYDROCHLORIDE 100 MG/1
200 TABLET, FILM COATED ORAL DAILY
Qty: 60 TABLET | Refills: 0 | Status: SHIPPED | OUTPATIENT
Start: 2021-10-14 | End: 2021-10-26

## 2021-10-14 NOTE — TELEPHONE ENCOUNTER
Please schedule a video visit for recheck of depression.   30 days given until appointment.   Kristen Kehr PA-C

## 2021-10-14 NOTE — TELEPHONE ENCOUNTER
"Routing refill request to provider for review/approval because:  Requested Prescriptions   Pending Prescriptions Disp Refills    sertraline (ZOLOFT) 100 MG tablet 180 tablet 1     Sig: Take 2 tablets (200 mg) by mouth daily        SSRIs Protocol Failed - 10/14/2021  7:23 AM        Failed - PHQ-9 score less than 5 in past 6 months     Please review last PHQ-9 score.           Passed - Medication is active on med list        Passed - Patient is age 18 or older        Passed - No active pregnancy on record        Passed - No positive pregnancy test in last 12 months        Passed - Recent (6 mo) or future (30 days) visit within the authorizing provider's specialty     Patient had office visit in the last 6 months or has a visit in the next 30 days with authorizing provider or within the authorizing provider's specialty.  See \"Patient Info\" tab in inbasket, or \"Choose Columns\" in Meds & Orders section of the refill encounter.                PHQ 12/14/2020 3/24/2021 3/25/2021   PHQ-9 Total Score 7 10 10   Q9: Thoughts of better off dead/self-harm past 2 weeks Not at all Not at all Not at all           Manda STERNN, RN        "

## 2021-10-26 ENCOUNTER — VIRTUAL VISIT (OUTPATIENT)
Dept: FAMILY MEDICINE | Facility: CLINIC | Age: 26
End: 2021-10-26
Payer: COMMERCIAL

## 2021-10-26 DIAGNOSIS — F41.8 DEPRESSION WITH ANXIETY: ICD-10-CM

## 2021-10-26 PROCEDURE — 99213 OFFICE O/P EST LOW 20 MIN: CPT | Mod: 95 | Performed by: PHYSICIAN ASSISTANT

## 2021-10-26 RX ORDER — SERTRALINE HYDROCHLORIDE 100 MG/1
200 TABLET, FILM COATED ORAL DAILY
Qty: 180 TABLET | Refills: 1 | Status: SHIPPED | OUTPATIENT
Start: 2021-10-26 | End: 2022-03-09

## 2021-10-26 ASSESSMENT — ANXIETY QUESTIONNAIRES
7. FEELING AFRAID AS IF SOMETHING AWFUL MIGHT HAPPEN: MORE THAN HALF THE DAYS
3. WORRYING TOO MUCH ABOUT DIFFERENT THINGS: NEARLY EVERY DAY
6. BECOMING EASILY ANNOYED OR IRRITABLE: NEARLY EVERY DAY
GAD7 TOTAL SCORE: 16
4. TROUBLE RELAXING: MORE THAN HALF THE DAYS
1. FEELING NERVOUS, ANXIOUS, OR ON EDGE: MORE THAN HALF THE DAYS
GAD7 TOTAL SCORE: 16
7. FEELING AFRAID AS IF SOMETHING AWFUL MIGHT HAPPEN: SEVERAL DAYS
2. NOT BEING ABLE TO STOP OR CONTROL WORRYING: MORE THAN HALF THE DAYS
8. IF YOU CHECKED OFF ANY PROBLEMS, HOW DIFFICULT HAVE THESE MADE IT FOR YOU TO DO YOUR WORK, TAKE CARE OF THINGS AT HOME, OR GET ALONG WITH OTHER PEOPLE?: SOMEWHAT DIFFICULT
7. FEELING AFRAID AS IF SOMETHING AWFUL MIGHT HAPPEN: MORE THAN HALF THE DAYS
GAD7 TOTAL SCORE: 17
5. BEING SO RESTLESS THAT IT IS HARD TO SIT STILL: MORE THAN HALF THE DAYS
5. BEING SO RESTLESS THAT IT IS HARD TO SIT STILL: MORE THAN HALF THE DAYS
1. FEELING NERVOUS, ANXIOUS, OR ON EDGE: NEARLY EVERY DAY
GAD7 TOTAL SCORE: 16
2. NOT BEING ABLE TO STOP OR CONTROL WORRYING: NEARLY EVERY DAY
6. BECOMING EASILY ANNOYED OR IRRITABLE: NEARLY EVERY DAY
3. WORRYING TOO MUCH ABOUT DIFFERENT THINGS: NEARLY EVERY DAY

## 2021-10-26 ASSESSMENT — PATIENT HEALTH QUESTIONNAIRE - PHQ9
SUM OF ALL RESPONSES TO PHQ QUESTIONS 1-9: 10
5. POOR APPETITE OR OVEREATING: MORE THAN HALF THE DAYS
SUM OF ALL RESPONSES TO PHQ QUESTIONS 1-9: 10
10. IF YOU CHECKED OFF ANY PROBLEMS, HOW DIFFICULT HAVE THESE PROBLEMS MADE IT FOR YOU TO DO YOUR WORK, TAKE CARE OF THINGS AT HOME, OR GET ALONG WITH OTHER PEOPLE: SOMEWHAT DIFFICULT

## 2021-10-26 NOTE — PROGRESS NOTES
"Meme is a 26 year old who is being evaluated via a billable video visit.      How would you like to obtain your AVS? MyChart  If the video visit is dropped, the invitation should be resent by: Send to e-mail at: [email or web address]  Will anyone else be joining your video visit? 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   I will see her again in 6 months, sooner if needed.   - sertraline (ZOLOFT) 100 MG tablet; Take 2 tablets (200 mg) by mouth daily             BMI:   Estimated body mass index is 41.64 kg/m  as calculated from the following:    Height as of 3/25/21: 1.676 m (5' 6\").    Weight as of 9/24/21: 117 kg (258 lb).   Weight management plan: not addressed    Depression Screening Follow Up    PHQ 10/26/2021   PHQ-9 Total Score 10   Q9: Thoughts of better off dead/self-harm past 2 weeks Not at all     Last PHQ-9 10/26/2021   1.  Little interest or pleasure in doing things 1   2.  Feeling down, depressed, or hopeless 1   3.  Trouble falling or staying asleep, or sleeping too much 2   4.  Feeling tired or having little energy 2   5.  Poor appetite or overeating 0   6.  Feeling bad about yourself 1   7.  Trouble concentrating 2   8.  Moving slowly or restless 1   Q9: Thoughts of better off dead/self-harm past 2 weeks 0   PHQ-9 Total Score 10   Difficulty at work, home, or with people -       Follow Up Actions Taken  see above.          No follow-ups on file.    Kristen M. Kehr, PA-C  [facility]    Subjective   Meme is a 26 year old who presents for the following health issues     HPI     Depression and Anxiety Follow-Up    How are you doing with your depression since your last visit? Improved     How are you doing with your " anxiety since your last visit?  Improved     Are you having other symptoms that might be associated with depression or anxiety? 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 No    Social History     Tobacco Use     Smoking status: Never Smoker     Smokeless tobacco: Never Used   Substance Use Topics     Alcohol use: No     Drug use: No     PHQ 12/14/2020 3/24/2021 3/25/2021   PHQ-9 Total Score 7 10 10   Q9: Thoughts of better off dead/self-harm past 2 weeks Not at all Not at all Not at all     AVRIL-7 SCORE 10/9/2020 11/19/2020 12/14/2020   Total Score - - -   Total Score - 11 (moderate anxiety) -   Total Score 14 11 11     Last PHQ-9 10/26/2021   1.  Little interest or pleasure in doing things 1   2.  Feeling down, depressed, or hopeless 1   3.  Trouble falling or staying asleep, or sleeping too much 2   4.  Feeling tired or having little energy 2   5.  Poor appetite or overeating 0   6.  Feeling bad about yourself 1   7.  Trouble concentrating 2   8.  Moving slowly or restless 1   Q9: Thoughts of better off dead/self-harm past 2 weeks 0   PHQ-9 Total Score 10   Difficulty at work, home, or with people -     AVRIL-7  10/26/2021   1. Feeling nervous, anxious, or on edge 2   2. Not being able to stop or control worrying 2   3. Worrying too much about different things 3   4. Trouble relaxing 2   5. Being so restless that it is hard to sit still 2   6. Becoming easily annoyed or irritable 3   7. Feeling afraid, as if something awful might happen 2   AVRIL-7 Total Score 16   If you checked any problems, how difficult have they made it for you to do your work, take care of things at home, or get along with other people? -       Suicide Assessment Five-step Evaluation and Treatment (SAFE-T)          Review of Systems   Constitutional, HEENT, cardiovascular, pulmonary, GI, , musculoskeletal, neuro, skin, endocrine and psych systems are  negative, except as otherwise noted.      Objective           Vitals:  No vitals were obtained today due to virtual visit.    Physical Exam   GENERAL: Healthy, alert and no distress  EYES: Eyes grossly normal to inspection.  No discharge or erythema, or obvious scleral/conjunctival abnormalities.  RESP: No audible wheeze, cough, or visible cyanosis.  No visible retractions or increased work of breathing.    SKIN: Visible skin clear. No significant rash, abnormal pigmentation or lesions.  NEURO: Cranial nerves grossly intact.  Mentation and speech appropriate for age.  PSYCH: Mentation appears normal, affect normal/bright, judgement and insight intact, normal speech and appearance well-groomed.                Video-Visit Details    Type of service:  Video Visit    Video End Time:10:51 AM    Originating Location (pt. Location): Other work    Distant Location (provider location):  [facility]     Platform used for Video Visit: [employer]

## 2021-10-27 ASSESSMENT — ASTHMA QUESTIONNAIRES: ACT_TOTALSCORE: 19

## 2021-10-27 ASSESSMENT — ANXIETY QUESTIONNAIRES: GAD7 TOTAL SCORE: 16

## 2021-10-27 ASSESSMENT — PATIENT HEALTH QUESTIONNAIRE - PHQ9: SUM OF ALL RESPONSES TO PHQ QUESTIONS 1-9: 10

## 2021-11-23 ENCOUNTER — ALLIED HEALTH/NURSE VISIT (OUTPATIENT)
Dept: FAMILY MEDICINE | Facility: CLINIC | Age: 26
End: 2021-11-23
Payer: COMMERCIAL

## 2021-11-23 DIAGNOSIS — Z30.42 ENCOUNTER FOR SURVEILLANCE OF INJECTABLE CONTRACEPTIVE: Primary | ICD-10-CM

## 2021-11-23 PROCEDURE — 96372 THER/PROPH/DIAG INJ SC/IM: CPT | Performed by: PHYSICIAN ASSISTANT

## 2021-11-23 PROCEDURE — 99207 PR NO CHARGE NURSE ONLY: CPT

## 2021-11-23 RX ADMIN — MEDROXYPROGESTERONE ACETATE 150 MG: 150 INJECTION, SUSPENSION INTRAMUSCULAR at 16:13

## 2021-11-23 NOTE — NURSING NOTE
BP: Data Unavailable    LAST PAP/EXAM: Lab Results       Component                Value               Date                       PAP                      NIL                 03/25/2021            URINE HCG:not indicated    The following medication was given:     MEDICATION: Depo Provera 150mg  ROUTE: IM  SITE: LUQ - Gluteus  : [facility]  LOT #: [id number]  EXP:04/01/2023  NDC:07276-024-31  NEXT INJECTION DUE: 2/8/22 - 2/22/22   Provider: Kehr Kristen P.A.  Pt was advised to wait in clinic for 15-20 mins after shot was given. Guillermo Li MA

## 2022-02-14 ENCOUNTER — ALLIED HEALTH/NURSE VISIT (OUTPATIENT)
Dept: FAMILY MEDICINE | Facility: CLINIC | Age: 27
End: 2022-02-14
Payer: COMMERCIAL

## 2022-02-14 DIAGNOSIS — Z30.42 ENCOUNTER FOR SURVEILLANCE OF INJECTABLE CONTRACEPTIVE: Primary | ICD-10-CM

## 2022-02-14 PROCEDURE — 99207 PR NO CHARGE NURSE ONLY: CPT

## 2022-02-14 PROCEDURE — 96372 THER/PROPH/DIAG INJ SC/IM: CPT | Performed by: PHYSICIAN ASSISTANT

## 2022-02-14 RX ADMIN — MEDROXYPROGESTERONE ACETATE 150 MG: 150 INJECTION, SUSPENSION INTRAMUSCULAR at 15:20

## 2022-02-14 NOTE — NURSING NOTE
Clinic Administered Medication Documentation    Administrations This Visit     medroxyPROGESTERone (DEPO-PROVERA) injection 150 mg     Admin Date  02/14/2022 Action  Given Dose  150 mg Route  Intramuscular Site  Right Ventrogluteal Administered By  Rosario Mccarthy    Ordering Provider: Kehr, Kristen M, PA-C    Patient Supplied?: No                  Depo Provera Documentation    URINE HCG: not indicated    Depo-Provera Standing Order inclusion/exclusion criteria reviewed.   Patient meets: inclusion criteria     BP: Data Unavailable  LAST PAP/EXAM:   Lab Results   Component Value Date    PAP NIL 03/25/2021       Prior to injection, verified patient identity using patient's name and date of birth. Medication was administered. Please see MAR and medication order for additional information.     Was entire vial of medication used? Yes  Vial/Syringe: Single dose vial  Expiration Date:  8/31/2023    Patient instructed to remain in clinic for 15 minutes.  NEXT INJECTION DUE: 5/2/22 - 5/16/22    Patient notified this was the last scheduled dose. Needs to see Provider for renewal of orders.    Rosario Mccarthy, CMA

## 2022-03-08 ASSESSMENT — PATIENT HEALTH QUESTIONNAIRE - PHQ9
10. IF YOU CHECKED OFF ANY PROBLEMS, HOW DIFFICULT HAVE THESE PROBLEMS MADE IT FOR YOU TO DO YOUR WORK, TAKE CARE OF THINGS AT HOME, OR GET ALONG WITH OTHER PEOPLE: VERY DIFFICULT
SUM OF ALL RESPONSES TO PHQ QUESTIONS 1-9: 7
SUM OF ALL RESPONSES TO PHQ QUESTIONS 1-9: 7

## 2022-03-09 ENCOUNTER — ANCILLARY PROCEDURE (OUTPATIENT)
Dept: GENERAL RADIOLOGY | Facility: CLINIC | Age: 27
End: 2022-03-09
Attending: PHYSICIAN ASSISTANT
Payer: COMMERCIAL

## 2022-03-09 ENCOUNTER — OFFICE VISIT (OUTPATIENT)
Dept: FAMILY MEDICINE | Facility: CLINIC | Age: 27
End: 2022-03-09
Payer: COMMERCIAL

## 2022-03-09 VITALS
TEMPERATURE: 96.8 F | HEIGHT: 67 IN | BODY MASS INDEX: 39.08 KG/M2 | SYSTOLIC BLOOD PRESSURE: 117 MMHG | OXYGEN SATURATION: 97 % | DIASTOLIC BLOOD PRESSURE: 82 MMHG | WEIGHT: 249 LBS | HEART RATE: 83 BPM

## 2022-03-09 DIAGNOSIS — F41.8 DEPRESSION WITH ANXIETY: ICD-10-CM

## 2022-03-09 DIAGNOSIS — R10.84 ABDOMINAL PAIN, GENERALIZED: ICD-10-CM

## 2022-03-09 DIAGNOSIS — R10.84 ABDOMINAL PAIN, GENERALIZED: Primary | ICD-10-CM

## 2022-03-09 DIAGNOSIS — E66.01 MORBID OBESITY (H): ICD-10-CM

## 2022-03-09 DIAGNOSIS — Z23 NEED FOR PROPHYLACTIC VACCINATION AND INOCULATION AGAINST INFLUENZA: ICD-10-CM

## 2022-03-09 PROCEDURE — 99214 OFFICE O/P EST MOD 30 MIN: CPT | Mod: 25 | Performed by: PHYSICIAN ASSISTANT

## 2022-03-09 PROCEDURE — 74019 RADEX ABDOMEN 2 VIEWS: CPT | Performed by: RADIOLOGY

## 2022-03-09 PROCEDURE — 90471 IMMUNIZATION ADMIN: CPT | Performed by: PHYSICIAN ASSISTANT

## 2022-03-09 PROCEDURE — 90686 IIV4 VACC NO PRSV 0.5 ML IM: CPT | Performed by: PHYSICIAN ASSISTANT

## 2022-03-09 RX ORDER — SERTRALINE HYDROCHLORIDE 100 MG/1
200 TABLET, FILM COATED ORAL DAILY
Qty: 180 TABLET | Refills: 1 | Status: SHIPPED | OUTPATIENT
Start: 2022-03-09 | End: 2022-07-29

## 2022-03-09 ASSESSMENT — PATIENT HEALTH QUESTIONNAIRE - PHQ9
10. IF YOU CHECKED OFF ANY PROBLEMS, HOW DIFFICULT HAVE THESE PROBLEMS MADE IT FOR YOU TO DO YOUR WORK, TAKE CARE OF THINGS AT HOME, OR GET ALONG WITH OTHER PEOPLE: VERY DIFFICULT
SUM OF ALL RESPONSES TO PHQ QUESTIONS 1-9: 7

## 2022-03-09 ASSESSMENT — ASTHMA QUESTIONNAIRES: ACT_TOTALSCORE: 24

## 2022-03-09 ASSESSMENT — PAIN SCALES - GENERAL: PAINLEVEL: MILD PAIN (2)

## 2022-03-09 NOTE — PROGRESS NOTES
Assessment & Plan     Abdominal pain, generalized  Most likely constipation.   High protein diet is causing some difficulty. Add more fiber.   For now she is going to use miralax 1/2 capful up to 2 times daily until she is having a daily stool.  Slowly work on adding fiber.   Okay to use the TUMS or Peptobismol for acute symptoms.   - XR Abdomen 2 Views; Future    Morbid obesity (H)  BMI is better. Encouraged to continue with her changes.   She has lost 40 pounds.     Need for prophylactic vaccination and inoculation against influenza    Depression with anxiety  Stable, she will be due for refills in the near future.   Prescription sent today.   - sertraline (ZOLOFT) 100 MG tablet; Take 2 tablets (200 mg) by mouth daily                 Return in about 1 year (around 3/9/2023).    Kristen M. Kehr, PA-C [facility]   Meme is a 27 year old who presents for the following health issues     Meme has been having sharp stomach pains, crampy in nature.   She has lost 40 pounds in the past 6 months. She is eating a low carb, high protein diet. She has crampy pain off and on. Sometimes there is nausea. She will use TUMS and it helps.   No blood in stool.   no diarrhea.   She no longer has a daily bowel movement since making the change with her diet.   She has cut out most caffeine. She is drinking 1/2-1 gallon of water daily.     History of Present Illness       Mental Health Follow-up:                    Today's PHQ-9         PHQ-9 Total Score: 7  PHQ-9 Q9 Thoughts of better off dead/self-harm past 2 weeks :   (P) Not at all    How difficult have these problems made it for you to do your work, take care of things at home, or get along with other people: Very difficult        Reason for visit:  Stomach Pains  Symptom onset:  More than a month  Symptoms include:  Sharp lower stomach pain with nausea  Symptom intensity:  Moderate  Symptom progression:  Worsening  Had these symptoms  "before:  Yes  Has tried/received treatment for these symptoms:  No    She eats 2-3 servings of fruits and vegetables daily.She consumes 0 sweetened beverage(s) daily.She exercises with enough effort to increase her heart rate 60 or more minutes per day.  She exercises with enough effort to increase her heart rate 5 days per week. She is missing 2 dose(s) of medications per week.  She is not taking prescribed medications regularly due to remembering to take.             Review of Systems   Constitutional, HEENT, cardiovascular, pulmonary, GI, , musculoskeletal, neuro, skin, endocrine and psych systems are negative, except as otherwise noted.      Objective    /82   Pulse 83   Temp 96.8  F (36  C) (Tympanic)   Ht 1.702 m (5' 7\")   Wt 112.9 kg (249 lb)   SpO2 97%   BMI 39.00 kg/m    Body mass index is 39 kg/m .  Physical Exam   GENERAL: healthy, alert and no distress  RESP: lungs clear to auscultation - no rales, rhonchi or wheezes  CV: regular rate and rhythm, normal S1 S2, no S3 or S4, no murmur, click or rub, no peripheral edema and peripheral pulses strong  ABDOMEN: soft, nontender, no hepatosplenomegaly, no masses and bowel sounds normal  MS: no gross musculoskeletal defects noted, no edema  SKIN: no suspicious lesions or rashes  PSYCH: mentation appears normal, affect normal/bright    Xray - Reviewed and interpreted by me.  Abdominal: moderate amount of stool, otherwise normal.             "

## 2022-03-09 NOTE — NURSING NOTE
"Chief Complaint   Patient presents with     Abdominal Pain       Initial /82   Pulse 83   Temp 96.8  F (36  C) (Tympanic)   Ht 1.702 m (5' 7\")   Wt 112.9 kg (249 lb)   SpO2 97%   BMI 39.00 kg/m   Estimated body mass index is 39 kg/m  as calculated from the following:    Height as of this encounter: 1.702 m (5' 7\").    Weight as of this encounter: 112.9 kg (249 lb).  Medication Reconciliation: complete    JYOTI Marquez MA    "

## 2022-03-09 NOTE — LETTER
My Asthma Action Plan    Name: Meme Mccurdy   YOB: 1995  Date: 3/9/2022   My doctor: Kristen M. Kehr, PA-C   My clinic: [facility]        My Rescue Medicine:   Albuterol inhaler (Proair/Ventolin/Proventil HFA)  2-4 puffs EVERY 4 HOURS as needed. Use a spacer if recommended by your provider.   My Asthma Severity:   Intermittent / Exercise Induced  Know your asthma triggers: pollens and exercise or sports             GREEN ZONE   Good Control    I feel good    No cough or wheeze    Can work, sleep and play without asthma symptoms       Take your asthma control medicine every day.     1. If exercise triggers your asthma, take your rescue medication    15 minutes before exercise or sports, and    During exercise if you have asthma symptoms  2. Spacer to use with inhaler: If you have a spacer, make sure to use it with your inhaler             YELLOW ZONE Getting Worse  I have ANY of these:    I do not feel good    Cough or wheeze    Chest feels tight    Wake up at night   1. Keep taking your Green Zone medications  2. Start taking your rescue medicine:    every 20 minutes for up to 1 hour. Then every 4 hours for 24-48 hours.  3. If you stay in the Yellow Zone for more than 12-24 hours, contact your doctor.  4. If you do not return to the Green Zone in 12-24 hours or you get worse, start taking your oral steroid medicine if prescribed by your provider.           RED ZONE Medical Alert - Get Help  I have ANY of these:    I feel awful    Medicine is not helping    Breathing getting harder    Trouble walking or talking    Nose opens wide to breathe       1. Take your rescue medicine NOW  2. If your provider has prescribed an oral steroid medicine, start taking it NOW  3. Call your doctor NOW  4. If you are still in the Red Zone after 20 minutes and you have not reached your doctor:    Take your rescue medicine again and    Call 911 or go to the emergency room right away    See your  regular doctor within 2 weeks of an Emergency Room or Urgent Care visit for follow-up treatment.          Annual Reminders:  Meet with Asthma Educator,  Flu Shot in the Fall, consider Pneumonia Vaccination for patients with asthma (aged 19 and older).    Pharmacy: [id number] - [place] - [phone]     Electronically signed by Kristen M. Kehr, PA-C   Date: 03/09/22                    Asthma Triggers  How To Control Things That Make Your Asthma Worse    Triggers are things that make your asthma worse.  Look at the list below to help you find your triggers and   what you can do about them. You can help prevent asthma flare-ups by staying away from your triggers.      Trigger                                                          What you can do   Cigarette Smoke  Tobacco smoke can make asthma worse. Do not allow smoking in your home, car or around you.  Be sure no one smokes at a child s day care or school.  If you smoke, ask your health care provider for ways to help you quit.  Ask family members to quit too.  Ask your health care provider for a referral to Quit Plan to help you quit smoking, or call 2-288-965-PLAN.     Colds, Flu, Bronchitis  These are common triggers of asthma. Wash your hands often.  Don t touch your eyes, nose or mouth.  Get a flu shot every year.     Dust Mites  These are tiny bugs that live in cloth or carpet. They are too small to see. Wash sheets and blankets in hot water every week.   Encase pillows and mattress in dust mite proof covers.  Avoid having carpet if you can. If you have carpet, vacuum weekly.   Use a dust mask and HEPA vacuum.   Pollen and Outdoor Mold  Some people are allergic to trees, grass, or weed pollen, or molds. Try to keep your windows closed.  Limit time out doors when pollen count is high.   Ask you health care provider about taking medicine during allergy season.     Animal Dander  Some people are allergic to skin flakes, urine or saliva from  pets with fur or feathers. Keep pets with fur or feathers out of your home.    If you can t keep the pet outdoors, then keep the pet out of your bedroom.  Keep the bedroom door closed.  Keep pets off cloth furniture and away from stuffed toys.     Mice, Rats, and Cockroaches  Some people are allergic to the waste from these pests.   Cover food and garbage.  Clean up spills and food crumbs.  Store grease in the refrigerator.   Keep food out of the bedroom.   Indoor Mold  This can be a trigger if your home has high moisture. Fix leaking faucets, pipes, or other sources of water.   Clean moldy surfaces.  Dehumidify basement if it is damp and smelly.   Smoke, Strong Odors, and Sprays  These can reduce air quality. Stay away from strong odors and sprays, such as perfume, powder, hair spray, paints, smoke incense, paint, cleaning products, candles and new carpet.   Exercise or Sports  Some people with asthma have this trigger. Be active!  Ask your doctor about taking medicine before sports or exercise to prevent symptoms.    Warm up for 5-10 minutes before and after sports or exercise.     Other Triggers of Asthma  Cold air:  Cover your nose and mouth with a scarf.  Sometimes laughing or crying can be a trigger.  Some medicines and food can trigger asthma.

## 2022-05-12 ENCOUNTER — ALLIED HEALTH/NURSE VISIT (OUTPATIENT)
Dept: FAMILY MEDICINE | Facility: CLINIC | Age: 27
End: 2022-05-12
Payer: COMMERCIAL

## 2022-05-12 DIAGNOSIS — Z30.42 SURVEILLANCE FOR INJECTABLE MEDROXYPROGESTERONE/ESTRADIOL: Primary | ICD-10-CM

## 2022-05-12 DIAGNOSIS — Z30.42 SURVEILLANCE OF CONTRACEPTIVE INJECTION: Primary | ICD-10-CM

## 2022-05-12 PROCEDURE — 96372 THER/PROPH/DIAG INJ SC/IM: CPT | Performed by: NURSE PRACTITIONER

## 2022-05-12 PROCEDURE — 99207 PR NO CHARGE NURSE ONLY: CPT

## 2022-05-12 RX ORDER — MEDROXYPROGESTERONE ACETATE 150 MG/ML
150 INJECTION, SUSPENSION INTRAMUSCULAR
Status: COMPLETED | OUTPATIENT
Start: 2022-05-12 | End: 2022-05-12

## 2022-05-12 RX ADMIN — MEDROXYPROGESTERONE ACETATE 150 MG: 150 INJECTION, SUSPENSION INTRAMUSCULAR at 15:37

## 2022-05-12 NOTE — PROGRESS NOTES
Clinic Administered Medication Documentation    Administrations This Visit     medroxyPROGESTERone (DEPO-PROVERA) injection 150 mg     Admin Date  05/12/2022 Action  Given Dose  150 mg Route  Intramuscular Site  Right Gluteus Dewayne Administered By  Carin Marion CMA    Ordering Provider: Hodan Moses CNP    Patient Supplied?: No                  Depo Provera Documentation    URINE HCG: not indicated    Depo-Provera Standing Order inclusion/exclusion criteria reviewed.   Patient meets: inclusion criteria     BP: Data Unavailable  LAST PAP/EXAM:   Lab Results   Component Value Date    PAP NIL 03/25/2021       Prior to injection, verified patient identity using patient's name and date of birth. Medication was administered. Please see MAR and medication order for additional information.     Was entire vial of medication used? Yes  Vial/Syringe: Single dose vial  Expiration Date:  09/2023    Patient instructed to stay in clinic after the injection but patient declined.  NEXT INJECTION DUE: 7/28/22 - 8/11/22    One time order entered by Hodan Moses. Patient reminded once again that she will NEED to schedule check up with Kristen Kehr in order to renew her depo orders. She verbally understood and I wrote down the next set of dates she is due-again told her to schedule check up prior to the above listed date.    Carin Marion CMA

## 2022-07-19 NOTE — PROGRESS NOTES
SUBJECTIVE:   CC: Meme Mccurdy is an 27 year old woman who presents for preventive health visit.     Patient has been advised of split billing requirements and indicates understanding: Yes     Answers for HPI/ROS submitted by the patient on 7/29/2022  AVRIL 7 TOTAL SCORE: 11      Not due for pap. Due for fasting labs.     Weight-has lost significant weight since last year down from 282 to 235. Encouraged. Has been working with  and eating more of a high protein diet. Not a restrictive diet. Lower in carbs.  Online .     -[facility] Med DEPO inj check and refill per pt. Is liking this and would like refill for the year. No concerns with it.     Asthma- only flares if she gets sick. Use albuterol rarely. Act scores to goal.     zoloft 200  Mg-works well for depression/anxiety. No side effects. Denies suicidal or homicidal thoughts.  Patient instructed to go to the emergency room or call 911 if these occur.  Mood is much better on this.         Healthy Habits:     Getting at least 3 servings of Calcium per day:  Yes    Bi-annual eye exam:  Yes    Dental care twice a year:  Yes    Sleep apnea or symptoms of sleep apnea:  None    Diet:  Other    Frequency of exercise:  4-5 days/week    Duration of exercise:  Greater than 60 minutes    Taking medications regularly:  Yes    Medication side effects:  None    PHQ-2 Total Score: 2    Additional concerns today:  No               Today's PHQ-2 Score:   PHQ-2 ( 1999 Pfizer) 3/8/2022   Q1: Little interest or pleasure in doing things 2   Q2: Feeling down, depressed or hopeless 2   PHQ-2 Score 4   PHQ-2 Total Score (12-17 Years)- Positive if 3 or more points; Administer PHQ-A if positive -   Q1: Little interest or pleasure in doing things More than half the days   Q2: Feeling down, depressed or hopeless More than half the days   PHQ-2 Score 4       Abuse: Current or Past (Physical, Sexual or Emotional) - No  Do you feel safe in your environment? Yes    Have you ever done  Advance Care Planning? (For example, a Health Directive, POLST, or a discussion with a medical provider or your loved ones about your wishes): No, advance care planning information given to patient to review.  Patient declined advance care planning discussion at this time.    Social History     Tobacco Use     Smoking status: Never Smoker     Smokeless tobacco: Never Used   Substance Use Topics     Alcohol use: No     If you drink alcohol do you typically have >3 drinks per day or >7 drinks per week? No    Alcohol Use 3/25/2021   Prescreen: >3 drinks/day or >7 drinks/week? No   Prescreen: >3 drinks/day or >7 drinks/week? -   No flowsheet data found.    Reviewed orders with patient.  Reviewed health maintenance and updated orders accordingly - Yes  Lab work is in process  Labs reviewed in EPIC  BP Readings from Last 3 Encounters:   07/29/22 103/74   03/09/22 117/82   09/24/21 113/74    Wt Readings from Last 3 Encounters:   07/29/22 106.6 kg (235 lb)   03/09/22 112.9 kg (249 lb)   09/24/21 117 kg (258 lb)                  Patient Active Problem List   Diagnosis     Melanocytic nevus of face     Ovarian cyst     Depression with anxiety     Insomnia     Encounter for initial prescription of injectable contraceptive     Asthma     BMI 36.0-36.9,adult     Past Surgical History:   Procedure Laterality Date     ENT SURGERY  12/19/2016     HC TOOTH EXTRACTION W/FORCEP  09/2008     TONSILLECTOMY Bilateral 12/19/2016    Procedure: TONSILLECTOMY;  Surgeon: Ilya Montilla MD;  Location:  OR       Social History     Tobacco Use     Smoking status: Never Smoker     Smokeless tobacco: Never Used   Substance Use Topics     Alcohol use: No     Family History   Problem Relation Age of Onset     Hypertension Mother      Arthritis Mother      Lipids Father         High Cholesterol     Breast Cancer Maternal Grandmother      Cancer Maternal Grandmother      Diabetes Maternal Grandfather      Hypertension Maternal Grandfather       Cerebrovascular Disease Other      Thyroid Disease No family hx of      Glaucoma No family hx of      Macular Degeneration No family hx of          Current Outpatient Medications   Medication Sig Dispense Refill     albuterol (PROAIR HFA/PROVENTIL HFA/VENTOLIN HFA) 108 (90 Base) MCG/ACT inhaler Inhale 2 puffs into the lungs every 6 hours 18 g 1     sertraline (ZOLOFT) 100 MG tablet Take 2 tablets (200 mg) by mouth daily 180 tablet 3     Allergies   Allergen Reactions     Nkda [No Known Drug Allergies]        Breast Cancer Screening:  Any new diagnosis of family breast, ovarian, or bowel cancer? No    FHS-7: No flowsheet data found.    Pertinent mammograms are reviewed under the imaging tab.    History of abnormal Pap smear: NO - age 21-29 PAP every 3 years recommended  PAP / HPV 3/25/2021 3/19/2018   PAP (Historical) NIL NIL     Reviewed and updated as needed this visit by clinical staff                    Reviewed and updated as needed this visit by Provider                   Past Medical History:   Diagnosis Date     Allergic rhinitis      Allergies     dog     Asthma 2021     BMI (body mass index), pediatric, 85% to less than 95% for age 1/3/2013     Chronic GERD      Depressive disorder 3/2015     Eczema      Melanocytic nevus of face 2009     Melanocytic nevus of face       Past Surgical History:   Procedure Laterality Date     ENT SURGERY  2016     HC TOOTH EXTRACTION W/FORCEP  2008     TONSILLECTOMY Bilateral 2016    Procedure: TONSILLECTOMY;  Surgeon: Ilya Montilla MD;  Location:  OR     OB History    Para Term  AB Living   0 0 0 0 0 0   SAB IAB Ectopic Multiple Live Births   0 0 0 0 0       Review of Systems   Constitutional: Negative for chills and fever.   HENT: Negative for congestion, ear pain, hearing loss and sore throat.    Eyes: Negative for pain and visual disturbance.   Respiratory: Negative for cough and shortness of breath.    Cardiovascular: Negative  for chest pain, palpitations and peripheral edema.   Gastrointestinal: Positive for abdominal pain and nausea. Negative for constipation, diarrhea, heartburn and hematochezia.   Breasts:  Negative for tenderness, breast mass and discharge.   Genitourinary: Negative for dysuria, frequency, genital sores, hematuria, pelvic pain, urgency, vaginal bleeding and vaginal discharge.   Musculoskeletal: Negative for arthralgias, joint swelling and myalgias.   Skin: Negative for rash.   Neurological: Negative for dizziness, weakness, headaches and paresthesias.   Psychiatric/Behavioral: Positive for mood changes. The patient is not nervous/anxious.           OBJECTIVE:   /74   Pulse 83   Temp 97.3  F (36.3  C) (Tympanic)   Resp 16   Wt 106.6 kg (235 lb)   SpO2 98%   Breastfeeding No   BMI 36.81 kg/m    Physical Exam  GENERAL: alert, no distress and obese  HENT: ear canals and TM's normal, nose and mouth without ulcers or lesions  NECK: no adenopathy, no asymmetry, masses, or scars and thyroid normal to palpation  RESP: lungs clear to auscultation - no rales, rhonchi or wheezes  BREAST: normal without masses, tenderness or nipple discharge and no palpable axillary masses or adenopathy  CV: regular rate and rhythm, normal S1 S2, no S3 or S4, no murmur, click or rub, no peripheral edema and peripheral pulses strong  ABDOMEN: soft, nontender, no hepatosplenomegaly, no masses and bowel sounds normal  MS: no gross musculoskeletal defects noted, no edema  SKIN: no suspicious lesions or rashes  NEURO: Normal strength and tone, mentation intact and speech normal  PSYCH: mentation appears normal, affect normal/bright        ASSESSMENT/PLAN:   (Z00.00) Routine general medical examination at a health care facility  (primary encounter diagnosis)  Comment:   Plan:     (F41.8) Depression with anxiety  Comment: doing well  Plan: sertraline (ZOLOFT) 100 MG tablet            (J45.20) Mild intermittent asthma without  "complication  Comment:   Plan: doing very well    (Z68.36) BMI 36.0-36.9,adult  Comment:   Plan: making great improvements, continue    (Z30.42) Surveillance for injectable medroxyprogesterone/estradiol  Comment:   Plan: medroxyPROGESTERone (DEPO-PROVERA) syringe 150         mg            (Z13.220) Screening, lipid  Comment:   Plan: Lipid panel reflex to direct LDL Fasting            (Z13.1) Screening for diabetes mellitus  Comment:   Plan: Basic metabolic panel  (Ca, Cl, CO2, Creat,         Gluc, K, Na, BUN)              Patient has been advised of split billing requirements and indicates understanding: Yes    COUNSELING:  Reviewed preventive health counseling, as reflected in patient instructions       Regular exercise       Healthy diet/nutrition       Vision screening       Hearing screening    Estimated body mass index is 39 kg/m  as calculated from the following:    Height as of 3/9/22: 1.702 m (5' 7\").    Weight as of 3/9/22: 112.9 kg (249 lb).        She reports that she has never smoked. She has never used smokeless tobacco.  Patient Instructions     Preventive Health Recommendations  Female Ages 21 to 25     Yearly exam:     See your health care provider every year in order to  o Review health changes.   o Discuss preventive care.    o Review your medicines if your doctor has prescribed any.      You should be tested each year for STDs (sexually transmitted diseases).       Talk to your provider about how often you should have cholesterol testing.      Get a Pap test every three years. If you have an abnormal result, your doctor may have you test more often.      If you are at risk for diabetes, you should have a diabetes test (fasting glucose).     Shots:     Get a flu shot each year.     Get a tetanus shot every 10 years.     Consider getting the shot (vaccine) that prevents cervical cancer (Gardasil).    Nutrition:     Eat at least 5 servings of fruits and vegetables each day.    Eat whole-grain bread, " whole-wheat pasta and brown rice instead of white grains and rice.    Get adequate Calcium and Vitamin D.     Lifestyle    Exercise at least 150 minutes a week each week (30 minutes a day, 5 days a week). This will help you control your weight and prevent disease.    Limit alcohol to one drink per day.    No smoking.     Wear sunscreen to prevent skin cancer.    See your dentist every six months for an exam and cleaning.  \  Counseling Resources:  ATP IV Guidelines  Pooled Cohorts Equation Calculator  Breast Cancer Risk Calculator  BRCA-Related Cancer Risk Assessment: FHS-7 Tool  FRAX Risk Assessment  ICSI Preventive Guidelines  Dietary Guidelines for Americans, 2010  USDA's MyPlate  ASA Prophylaxis  Lung CA Screening    LAURA Amaro [facility]

## 2022-07-29 ENCOUNTER — OFFICE VISIT (OUTPATIENT)
Dept: FAMILY MEDICINE | Facility: CLINIC | Age: 27
End: 2022-07-29
Payer: COMMERCIAL

## 2022-07-29 VITALS
SYSTOLIC BLOOD PRESSURE: 103 MMHG | BODY MASS INDEX: 36.81 KG/M2 | DIASTOLIC BLOOD PRESSURE: 74 MMHG | HEART RATE: 83 BPM | RESPIRATION RATE: 16 BRPM | OXYGEN SATURATION: 98 % | TEMPERATURE: 97.3 F | WEIGHT: 235 LBS

## 2022-07-29 DIAGNOSIS — J45.20 MILD INTERMITTENT ASTHMA WITHOUT COMPLICATION: ICD-10-CM

## 2022-07-29 DIAGNOSIS — Z30.42 SURVEILLANCE FOR INJECTABLE MEDROXYPROGESTERONE/ESTRADIOL: ICD-10-CM

## 2022-07-29 DIAGNOSIS — F41.8 DEPRESSION WITH ANXIETY: ICD-10-CM

## 2022-07-29 DIAGNOSIS — Z13.220 SCREENING, LIPID: ICD-10-CM

## 2022-07-29 DIAGNOSIS — Z00.00 ROUTINE GENERAL MEDICAL EXAMINATION AT A HEALTH CARE FACILITY: Primary | ICD-10-CM

## 2022-07-29 DIAGNOSIS — Z13.1 SCREENING FOR DIABETES MELLITUS: ICD-10-CM

## 2022-07-29 PROBLEM — E66.01 MORBID OBESITY (H): Status: RESOLVED | Noted: 2018-09-12 | Resolved: 2022-07-29

## 2022-07-29 LAB
ANION GAP SERPL CALCULATED.3IONS-SCNC: 5 MMOL/L (ref 3–14)
BUN SERPL-MCNC: 16 MG/DL (ref 7–30)
CALCIUM SERPL-MCNC: 9.2 MG/DL (ref 8.5–10.1)
CHLORIDE BLD-SCNC: 111 MMOL/L (ref 94–109)
CHOLEST SERPL-MCNC: 180 MG/DL
CO2 SERPL-SCNC: 25 MMOL/L (ref 20–32)
CREAT SERPL-MCNC: 0.86 MG/DL (ref 0.52–1.04)
FASTING STATUS PATIENT QL REPORTED: YES
GFR SERPL CREATININE-BSD FRML MDRD: >90 ML/MIN/1.73M2
GLUCOSE BLD-MCNC: 86 MG/DL (ref 70–99)
HDLC SERPL-MCNC: 48 MG/DL
LDLC SERPL CALC-MCNC: 119 MG/DL
NONHDLC SERPL-MCNC: 132 MG/DL
POTASSIUM BLD-SCNC: 3.8 MMOL/L (ref 3.4–5.3)
SODIUM SERPL-SCNC: 141 MMOL/L (ref 133–144)
TRIGL SERPL-MCNC: 66 MG/DL

## 2022-07-29 PROCEDURE — 80048 BASIC METABOLIC PNL TOTAL CA: CPT | Performed by: PHYSICIAN ASSISTANT

## 2022-07-29 PROCEDURE — 80061 LIPID PANEL: CPT | Performed by: PHYSICIAN ASSISTANT

## 2022-07-29 PROCEDURE — 96372 THER/PROPH/DIAG INJ SC/IM: CPT | Performed by: PHYSICIAN ASSISTANT

## 2022-07-29 PROCEDURE — 99395 PREV VISIT EST AGE 18-39: CPT | Mod: 25 | Performed by: PHYSICIAN ASSISTANT

## 2022-07-29 PROCEDURE — 36415 COLL VENOUS BLD VENIPUNCTURE: CPT | Performed by: PHYSICIAN ASSISTANT

## 2022-07-29 RX ORDER — SERTRALINE HYDROCHLORIDE 100 MG/1
200 TABLET, FILM COATED ORAL DAILY
Qty: 180 TABLET | Refills: 3 | Status: SHIPPED | OUTPATIENT
Start: 2022-07-29 | End: 2023-05-22

## 2022-07-29 RX ORDER — MEDROXYPROGESTERONE ACETATE 150 MG/ML
150 INJECTION, SUSPENSION INTRAMUSCULAR
Status: COMPLETED | OUTPATIENT
Start: 2022-07-29 | End: 2023-07-19

## 2022-07-29 RX ADMIN — MEDROXYPROGESTERONE ACETATE 150 MG: 150 INJECTION, SUSPENSION INTRAMUSCULAR at 08:56

## 2022-07-29 ASSESSMENT — ANXIETY QUESTIONNAIRES
4. TROUBLE RELAXING: MORE THAN HALF THE DAYS
8. IF YOU CHECKED OFF ANY PROBLEMS, HOW DIFFICULT HAVE THESE MADE IT FOR YOU TO DO YOUR WORK, TAKE CARE OF THINGS AT HOME, OR GET ALONG WITH OTHER PEOPLE?: VERY DIFFICULT
6. BECOMING EASILY ANNOYED OR IRRITABLE: NEARLY EVERY DAY
IF YOU CHECKED OFF ANY PROBLEMS ON THIS QUESTIONNAIRE, HOW DIFFICULT HAVE THESE PROBLEMS MADE IT FOR YOU TO DO YOUR WORK, TAKE CARE OF THINGS AT HOME, OR GET ALONG WITH OTHER PEOPLE: VERY DIFFICULT
3. WORRYING TOO MUCH ABOUT DIFFERENT THINGS: MORE THAN HALF THE DAYS
7. FEELING AFRAID AS IF SOMETHING AWFUL MIGHT HAPPEN: SEVERAL DAYS
GAD7 TOTAL SCORE: 11
5. BEING SO RESTLESS THAT IT IS HARD TO SIT STILL: NOT AT ALL
1. FEELING NERVOUS, ANXIOUS, OR ON EDGE: SEVERAL DAYS
GAD7 TOTAL SCORE: 11
7. FEELING AFRAID AS IF SOMETHING AWFUL MIGHT HAPPEN: SEVERAL DAYS
2. NOT BEING ABLE TO STOP OR CONTROL WORRYING: MORE THAN HALF THE DAYS

## 2022-07-29 ASSESSMENT — ENCOUNTER SYMPTOMS
DYSURIA: 0
FREQUENCY: 0
WEAKNESS: 0
HEARTBURN: 0
HEMATURIA: 0
ABDOMINAL PAIN: 1
SORE THROAT: 0
PALPITATIONS: 0
PARESTHESIAS: 0
DIZZINESS: 0
FEVER: 0
COUGH: 0
DIARRHEA: 0
MYALGIAS: 0
HEADACHES: 0
BREAST MASS: 0
NAUSEA: 1
JOINT SWELLING: 0
HEMATOCHEZIA: 0
CONSTIPATION: 0
SHORTNESS OF BREATH: 0
CHILLS: 0
EYE PAIN: 0
NERVOUS/ANXIOUS: 0
ARTHRALGIAS: 0

## 2022-07-29 NOTE — NURSING NOTE
BP: 103/74    LAST PAP/EXAM: Lab Results       Component                Value               Date                       PAP                      NIL                 03/25/2021            URINE HCG:not indicated    The following medication was given:     MEDICATION: Depo Provera 150mg  ROUTE: IM  SITE: LUQ - Gluteus  : Mylan  LOT #: [id number]  EXP:01/01/2024  NDC: 12767-945-98  NEXT INJECTION DUE: 10/14/22 - 10/28/22   Provider: Jazmin WANG  Pt was given reminder card and told to wait 15-20 mins after shot was given. Guillermo Li MA

## 2022-07-29 NOTE — RESULT ENCOUNTER NOTE
Komal Valentine,       Your recent test results are attached, if you have any questions or concerns please feel free to contact me via e-mail or call [phone].  Sodium and potassium normal. Blood sugar (glucose) normal.  Creatinine and GFR normal, which means kidney function is normal.   Cholesterol is a little bit high but not to the point where you need medication.  I would recheck labs in 1 year.         It was a pleasure to see you at your recent office visit.      Sincerely,  Jazmin Olivares PA-C

## 2022-10-20 ENCOUNTER — ALLIED HEALTH/NURSE VISIT (OUTPATIENT)
Dept: FAMILY MEDICINE | Facility: CLINIC | Age: 27
End: 2022-10-20
Payer: COMMERCIAL

## 2022-10-20 DIAGNOSIS — Z23 NEED FOR PROPHYLACTIC VACCINATION AND INOCULATION AGAINST INFLUENZA: ICD-10-CM

## 2022-10-20 DIAGNOSIS — Z30.42 ENCOUNTER FOR SURVEILLANCE OF INJECTABLE CONTRACEPTIVE: Primary | ICD-10-CM

## 2022-10-20 PROCEDURE — 99207 PR NO CHARGE NURSE ONLY: CPT

## 2022-10-20 PROCEDURE — 90471 IMMUNIZATION ADMIN: CPT

## 2022-10-20 PROCEDURE — 90686 IIV4 VACC NO PRSV 0.5 ML IM: CPT

## 2022-10-20 NOTE — PROGRESS NOTES
BP: Data Unavailable    LAST PAP/EXAM:   Lab Results   Component Value Date    PAP NIL 03/25/2021     URINE HCG:not indicated    The following medication was given:     MEDICATION: Depo Provera 150mg  ROUTE: IM  SITE: Ventrogluteal - Left  : Mylan   LOT #: [id number]   EXP:09/2023  NEXT INJECTION DUE: 1/5/23 - 1/19/23   Provider: Kristen Kehr

## 2023-01-08 ENCOUNTER — MYC MEDICAL ADVICE (OUTPATIENT)
Dept: FAMILY MEDICINE | Facility: CLINIC | Age: 28
End: 2023-01-08

## 2023-01-08 DIAGNOSIS — H93.8X9 EAR MASS, UNSPECIFIED LATERALITY: Primary | ICD-10-CM

## 2023-01-19 ENCOUNTER — ALLIED HEALTH/NURSE VISIT (OUTPATIENT)
Dept: FAMILY MEDICINE | Facility: CLINIC | Age: 28
End: 2023-01-19
Payer: COMMERCIAL

## 2023-01-19 DIAGNOSIS — Z30.42 ENCOUNTER FOR SURVEILLANCE OF INJECTABLE CONTRACEPTIVE: Primary | ICD-10-CM

## 2023-01-19 PROCEDURE — 99207 PR NO CHARGE NURSE ONLY: CPT

## 2023-01-19 PROCEDURE — 96372 THER/PROPH/DIAG INJ SC/IM: CPT | Performed by: PHYSICIAN ASSISTANT

## 2023-01-19 RX ADMIN — MEDROXYPROGESTERONE ACETATE 150 MG: 150 INJECTION, SUSPENSION INTRAMUSCULAR at 14:03

## 2023-01-19 NOTE — PROGRESS NOTES
Clinic Administered Medication Documentation    Administrations This Visit     medroxyPROGESTERone (DEPO-PROVERA) syringe 150 mg     Admin Date  01/19/2023 Action  Given Dose  150 mg Route  Intramuscular Site  Right Ventrogluteal Administered By  Shereen Naqvi CMA    Ordering Provider: Jazmin Olivares PA-C    Patient Supplied?: No                  Depo Provera Documentation    URINE HCG: not indicated    Depo-Provera Standing Order inclusion/exclusion criteria reviewed.     Patient meets: inclusion criteria     BP: Data Unavailable  LAST PAP/EXAM:   Lab Results   Component Value Date    PAP NIL 03/25/2021       Prior to injection, verified patient identity using patient's name and date of birth. Medication was administered. Please see MAR and medication order for additional information.     Was entire vial of medication used? Yes  Vial/Syringe: Single dose vial  Expiration Date:  5/2024    Patient instructed to remain in clinic for 15 minutes.  NEXT INJECTION DUE: 4/6/23 - 4/20/23    Shereen Naqvi MA

## 2023-02-01 NOTE — PROGRESS NOTES
I am seeing this patient in consultation for ear mass at the request of the provider Kristin Kehr.    Chief Complaint - right ear skin lesion     History of Present Illness - Meme Mccurdy is a 28 year old female who presents to me today with right ear skin mass. She feels it is a keloid. She had a right helix piercing and developed a lump. This developed over 3 years, but worsened the last few months prompting her to take out the ring. It has gotten better, pain is improved, but lump still there. She is status post tonsillectomy on 12/19/2016 by me. She has a h/o keloid of her belly button piercing.       Past Medical History -   Patient Active Problem List   Diagnosis     Melanocytic nevus of face     Ovarian cyst     Depression with anxiety     Insomnia     Encounter for initial prescription of injectable contraceptive     Asthma     BMI 36.0-36.9,adult       Current Medications -   Current Outpatient Medications:      albuterol (PROAIR HFA/PROVENTIL HFA/VENTOLIN HFA) 108 (90 Base) MCG/ACT inhaler, Inhale 2 puffs into the lungs every 6 hours, Disp: 18 g, Rfl: 1     sertraline (ZOLOFT) 100 MG tablet, Take 2 tablets (200 mg) by mouth daily, Disp: 180 tablet, Rfl: 3    Current Facility-Administered Medications:      medroxyPROGESTERone (DEPO-PROVERA) syringe 150 mg, 150 mg, Intramuscular, Q90 Days, Jazmin Olivares PA-C, 150 mg at 01/19/23 1403    Allergies -   Allergies   Allergen Reactions     Nkda [No Known Drug Allergies]        Social History -   Social History     Socioeconomic History     Marital status: Single   Tobacco Use     Smoking status: Never     Smokeless tobacco: Never   Vaping Use     Vaping Use: Never used   Substance and Sexual Activity     Alcohol use: No     Drug use: No     Sexual activity: Yes     Partners: Male     Birth control/protection: Injection   Other Topics Concern     Parent/sibling w/ CABG, MI or angioplasty before 65F 55M? No       Family History -   Family History    Problem Relation Age of Onset     Hypertension Mother      Arthritis Mother      Lipids Father         High Cholesterol     Breast Cancer Maternal Grandmother      Cancer Maternal Grandmother      Diabetes Maternal Grandfather      Hypertension Maternal Grandfather      Cerebrovascular Disease Other      Thyroid Disease No family hx of      Glaucoma No family hx of      Macular Degeneration No family hx of        Physical Exam  /75   Pulse 84   Resp 16   SpO2 98%   General - The patient is in no distress.  Alert and oriented to person and place, answers questions and cooperates with examination appropriately.   Voice and Breathing - The patient was breathing comfortably without the use of accessory muscles. There was no wheezing, stridor, or stertor.  The patients voice was clear and strong.  Ears - The right ear was examined. It showed a 7-8 mm smooth lesion of the right helix where her prior piercing was, some firmness. This maybe keloid. No erythema. The left pinna was normal. The ear canals are clean, tympanic membranes intact, no effusions.  Neck - Soft, non-tender. Palpation of the occipital, submental, submandibular, internal jugular chain, and supraclavicular nodes did not demonstrateany abnormal lymph nodes or masses. No parotid masses. Palpation of the thyroid was soft and smooth, with no nodules or goiter appreciated.  The trachea was mobile and midline.  Neurological - Cranial nerves 2 through 12 were grossly intact. House-Brackmann grade 1 out of 6 bilaterally.      Assessment and Plan - Meme Mccurdy is a 28 year old female has a possible keloid or hypertrophic scar of her right pinna.  There is a cosmetic deformity there and she would like this corrected. I recommend she see Dr. Aponte for this.    Ilya Montilla MD  Otolaryngology  [facility]

## 2023-02-03 ENCOUNTER — OFFICE VISIT (OUTPATIENT)
Dept: OTOLARYNGOLOGY | Facility: CLINIC | Age: 28
End: 2023-02-03
Attending: OTOLARYNGOLOGY
Payer: COMMERCIAL

## 2023-02-03 ENCOUNTER — TELEPHONE (OUTPATIENT)
Dept: DERMATOLOGY | Facility: CLINIC | Age: 28
End: 2023-02-03

## 2023-02-03 VITALS
OXYGEN SATURATION: 98 % | DIASTOLIC BLOOD PRESSURE: 75 MMHG | HEART RATE: 84 BPM | SYSTOLIC BLOOD PRESSURE: 105 MMHG | RESPIRATION RATE: 16 BRPM

## 2023-02-03 DIAGNOSIS — L91.0 KELOID SCAR: Primary | ICD-10-CM

## 2023-02-03 DIAGNOSIS — H93.8X9 EAR MASS, UNSPECIFIED LATERALITY: ICD-10-CM

## 2023-02-03 PROCEDURE — 99202 OFFICE O/P NEW SF 15 MIN: CPT | Performed by: OTOLARYNGOLOGY

## 2023-02-03 NOTE — TELEPHONE ENCOUNTER
Health Call Center    Phone Message    May a detailed message be left on voicemail: yes     Reason for Call: Appointment Intake      Referring Provider Name:   Ilya Montilla MD in [facility]    Diagnosis and/or Symptoms:   Keloid scar   right ear lesion, likely keloid, wants removal      Pt was Referred to [facility] so routing over to you to review    [facility]PT  Referral Type: Derm Surgery  Dated 02/03/23    I am supposed to route over all [facility] ones sitting in Referral Order workqueue for review - Thank you!      Action Taken: Message routed to:  Other: [facility]    Travel Screening: Not Applicable

## 2023-02-03 NOTE — TELEPHONE ENCOUNTER
Please help schedule next available consult appointment with Dr. Aponte for keloid on right ear.    (not an urgent referral)    Ludmila Sierra RN on 2/3/2023 at 2:27 PM

## 2023-02-03 NOTE — LETTER
2/3/2023         RE: Meme Mccurdy  [address] [id number]        Dear Colleague,    Thank you for referring your patient, Meme Mccurdy, to the [facility]. Please see a copy of my visit note below.    I am seeing this patient in consultation for ear mass at the request of the provider Kristin Kehr.    Chief Complaint - right ear skin lesion     History of Present Illness - Meme Mccurdy is a 28 year old female who presents to me today with right ear skin mass. She feels it is a keloid. She had a right helix piercing and developed a lump. This developed over 3 years, but worsened the last few months prompting her to take out the ring. It has gotten better, pain is improved, but lump still there. She is status post tonsillectomy on 12/19/2016 by me. She has a h/o keloid of her belly button piercing.       Past Medical History -   Patient Active Problem List   Diagnosis     Melanocytic nevus of face     Ovarian cyst     Depression with anxiety     Insomnia     Encounter for initial prescription of injectable contraceptive     Asthma     BMI 36.0-36.9,adult       Current Medications -   Current Outpatient Medications:      albuterol (PROAIR HFA/PROVENTIL HFA/VENTOLIN HFA) 108 (90 Base) MCG/ACT inhaler, Inhale 2 puffs into the lungs every 6 hours, Disp: 18 g, Rfl: 1     sertraline (ZOLOFT) 100 MG tablet, Take 2 tablets (200 mg) by mouth daily, Disp: 180 tablet, Rfl: 3    Current Facility-Administered Medications:      medroxyPROGESTERone (DEPO-PROVERA) syringe 150 mg, 150 mg, Intramuscular, Q90 Days, Jazmin Olivares PA-C, 150 mg at 01/19/23 1403    Allergies -   Allergies   Allergen Reactions     Nkda [No Known Drug Allergies]        Social History -   Social History     Socioeconomic History     Marital status: Single   Tobacco Use     Smoking status: Never     Smokeless tobacco: Never   Vaping Use     Vaping Use: Never used   Substance and Sexual Activity      Alcohol use: No     Drug use: No     Sexual activity: Yes     Partners: Male     Birth control/protection: Injection   Other Topics Concern     Parent/sibling w/ CABG, MI or angioplasty before 65F 55M? No       Family History -   Family History   Problem Relation Age of Onset     Hypertension Mother      Arthritis Mother      Lipids Father         High Cholesterol     Breast Cancer Maternal Grandmother      Cancer Maternal Grandmother      Diabetes Maternal Grandfather      Hypertension Maternal Grandfather      Cerebrovascular Disease Other      Thyroid Disease No family hx of      Glaucoma No family hx of      Macular Degeneration No family hx of        Physical Exam  /75   Pulse 84   Resp 16   SpO2 98%   General - The patient is in no distress.  Alert and oriented to person and place, answers questions and cooperates with examination appropriately.   Voice and Breathing - The patient was breathing comfortably without the use of accessory muscles. There was no wheezing, stridor, or stertor.  The patients voice was clear and strong.  Ears - The right ear was examined. It showed a 7-8 mm smooth lesion of the right helix where her prior piercing was, some firmness. This maybe keloid. No erythema. The left pinna was normal. The ear canals are clean, tympanic membranes intact, no effusions.  Neck - Soft, non-tender. Palpation of the occipital, submental, submandibular, internal jugular chain, and supraclavicular nodes did not demonstrateany abnormal lymph nodes or masses. No parotid masses. Palpation of the thyroid was soft and smooth, with no nodules or goiter appreciated.  The trachea was mobile and midline.  Neurological - Cranial nerves 2 through 12 were grossly intact. House-Brackmann grade 1 out of 6 bilaterally.      Assessment and Plan - Meme Mccurdy is a 28 year old female has a possible keloid or hypertrophic scar of her right pinna.  There is a cosmetic deformity there and she would like this  corrected. I recommend she see Dr. Aponte for this.    Ilya Montilla MD  Otolaryngology  [facility]          Again, thank you for allowing me to participate in the care of your patient.        Sincerely,        Ilya Montilla MD

## 2023-02-20 ENCOUNTER — OFFICE VISIT (OUTPATIENT)
Dept: DERMATOLOGY | Facility: CLINIC | Age: 28
End: 2023-02-20
Payer: COMMERCIAL

## 2023-02-20 DIAGNOSIS — L91.0 KELOID: Primary | ICD-10-CM

## 2023-02-20 PROCEDURE — 99202 OFFICE O/P NEW SF 15 MIN: CPT | Performed by: DERMATOLOGY

## 2023-02-20 NOTE — PROGRESS NOTES
Meme Mccurdy , a 28 year old year old female patient, I was asked to see by Dr. Montilla for scar on right helix.  Patient states this has been present for a while.  Patient reports the following symptoms:  tender .  Patient reports the following previous treatments none.  Patient reports the following modifying factors none.  Associated symptoms: none.  Patient has no other skin complaints today.  Remainder of the HPI, Meds, PMH, Allergies, FH, and SH was reviewed in chart.      Past Medical History:   Diagnosis Date     Allergic rhinitis      Allergies     dog     Asthma 9/24/2021     BMI (body mass index), pediatric, 85% to less than 95% for age 1/3/2013     Chronic GERD      Depressive disorder 3/2015     Eczema      Melanocytic nevus of face 12/2009     Melanocytic nevus of face        Past Surgical History:   Procedure Laterality Date     ENT SURGERY  12/19/2016     HC TOOTH EXTRACTION W/FORCEP  09/2008     TONSILLECTOMY Bilateral 12/19/2016    Procedure: TONSILLECTOMY;  Surgeon: Ilya Montilla MD;  Location: [facility] OR        Family History   Problem Relation Age of Onset     Hypertension Mother      Arthritis Mother      Lipids Father         High Cholesterol     Breast Cancer Maternal Grandmother      Cancer Maternal Grandmother      Diabetes Maternal Grandfather      Hypertension Maternal Grandfather      Cerebrovascular Disease Other      Thyroid Disease No family hx of      Glaucoma No family hx of      Macular Degeneration No family hx of        Social History     Socioeconomic History     Marital status: Single     Spouse name: Not on file     Number of children: Not on file     Years of education: Not on file     Highest education level: Not on file   Occupational History     Not on file   Tobacco Use     Smoking status: Never     Smokeless tobacco: Never   Vaping Use     Vaping Use: Never used   Substance and Sexual Activity     Alcohol use: No     Drug use: No     Sexual activity: Yes     Partners:  Male     Birth control/protection: Injection   Other Topics Concern     Parent/sibling w/ CABG, MI or angioplasty before 65F 55M? No   Social History Narrative     Not on file     Social Determinants of Health     Financial Resource Strain: Not on file   Food Insecurity: Not on file   Transportation Needs: Not on file   Physical Activity: Not on file   Stress: Not on file   Social Connections: Not on file   Intimate Partner Violence: Not on file   Housing Stability: Not on file       Outpatient Encounter Medications as of 2/20/2023   Medication Sig Dispense Refill     albuterol (PROAIR HFA/PROVENTIL HFA/VENTOLIN HFA) 108 (90 Base) MCG/ACT inhaler Inhale 2 puffs into the lungs every 6 hours 18 g 1     sertraline (ZOLOFT) 100 MG tablet Take 2 tablets (200 mg) by mouth daily 180 tablet 3     Facility-Administered Encounter Medications as of 2/20/2023   Medication Dose Route Frequency Provider Last Rate Last Admin     medroxyPROGESTERone (DEPO-PROVERA) syringe 150 mg  150 mg Intramuscular Q90 Days Jazmin Olivares PA-C   150 mg at 01/19/23 1403             Review Of Systems  Skin: As above  Eyes: negative  Ears/Nose/Throat: negative  Respiratory: No shortness of breath, dyspnea on exertion, cough, or hemoptysis  Cardiovascular: negative  Gastrointestinal: negative  Genitourinary: negative  Musculoskeletal: negative  Neurologic: negative  Psychiatric: negative  Hematologic/Lymphatic/Immunologic: negative  Endocrine: negative      O:   NAD, WDWN, Alert & Oriented, Mood & Affect wnl, Vitals stable   Here today alone   General appearance merced ii   Vitals stable   Alert, oriented and in no acute distress   R helix skin colored plaque      Eyes: Conjunctivae/lids:Normal     ENT: Lips, buccal mucosa, tongue: normal    MSK:Normal    Cardiovascular: peripheral edema none    Pulm: Breathing Normal    Neuro/Psych: Orientation:Normal; Mood/Affect:Normal      A/P:  1. Keloid  Il tac, excision , excision + radiation  discussed with patient   Recurrence discussed with patient   Schedule excision patint wants excised  It was a pleasure speaking to Meme Mccurdy today.  Previous clinic  notes and pertinent laboratory tests were reviewed prior to Meme Mccurdy's visit.  Nature of benign skin lesions dicussed with patient.

## 2023-02-20 NOTE — LETTER
2/20/2023         RE: Meme Mccurdy  [address] [facility] [address]        Dear Colleague,    Thank you for referring your patient, Meme Mccurdy, to the [facility]. Please see a copy of my visit note below.    Meme Mccurdy , a 28 year old year old female patient, I was asked to see by Dr. Montilla for scar on right helix.  Patient states this has been present for a while.  Patient reports the following symptoms:  tender .  Patient reports the following previous treatments none.  Patient reports the following modifying factors none.  Associated symptoms: none.  Patient has no other skin complaints today.  Remainder of the HPI, Meds, PMH, Allergies, FH, and SH was reviewed in chart.      Past Medical History:   Diagnosis Date     Allergic rhinitis      Allergies     dog     Asthma 9/24/2021     BMI (body mass index), pediatric, 85% to less than 95% for age 1/3/2013     Chronic GERD      Depressive disorder 3/2015     Eczema      Melanocytic nevus of face 12/2009     Melanocytic nevus of face        Past Surgical History:   Procedure Laterality Date     ENT SURGERY  12/19/2016     HC TOOTH EXTRACTION W/FORCEP  09/2008     TONSILLECTOMY Bilateral 12/19/2016    Procedure: TONSILLECTOMY;  Surgeon: Ilya Montilla MD;  Location: [facility] OR        Family History   Problem Relation Age of Onset     Hypertension Mother      Arthritis Mother      Lipids Father         High Cholesterol     Breast Cancer Maternal Grandmother      Cancer Maternal Grandmother      Diabetes Maternal Grandfather      Hypertension Maternal Grandfather      Cerebrovascular Disease Other      Thyroid Disease No family hx of      Glaucoma No family hx of      Macular Degeneration No family hx of        Social History     Socioeconomic History     Marital status: Single     Spouse name: Not on file     Number of children: Not on file     Years of education: Not on file     Highest education level: Not on file    Occupational History     Not on file   Tobacco Use     Smoking status: Never     Smokeless tobacco: Never   Vaping Use     Vaping Use: Never used   Substance and Sexual Activity     Alcohol use: No     Drug use: No     Sexual activity: Yes     Partners: Male     Birth control/protection: Injection   Other Topics Concern     Parent/sibling w/ CABG, MI or angioplasty before 65F 55M? No   Social History Narrative     Not on file     Social Determinants of Health     Financial Resource Strain: Not on file   Food Insecurity: Not on file   Transportation Needs: Not on file   Physical Activity: Not on file   Stress: Not on file   Social Connections: Not on file   Intimate Partner Violence: Not on file   Housing Stability: Not on file       Outpatient Encounter Medications as of 2/20/2023   Medication Sig Dispense Refill     albuterol (PROAIR HFA/PROVENTIL HFA/VENTOLIN HFA) 108 (90 Base) MCG/ACT inhaler Inhale 2 puffs into the lungs every 6 hours 18 g 1     sertraline (ZOLOFT) 100 MG tablet Take 2 tablets (200 mg) by mouth daily 180 tablet 3     Facility-Administered Encounter Medications as of 2/20/2023   Medication Dose Route Frequency Provider Last Rate Last Admin     medroxyPROGESTERone (DEPO-PROVERA) syringe 150 mg  150 mg Intramuscular Q90 Days Jazmin Olivares PA-C   150 mg at 01/19/23 1403             Review Of Systems  Skin: As above  Eyes: negative  Ears/Nose/Throat: negative  Respiratory: No shortness of breath, dyspnea on exertion, cough, or hemoptysis  Cardiovascular: negative  Gastrointestinal: negative  Genitourinary: negative  Musculoskeletal: negative  Neurologic: negative  Psychiatric: negative  Hematologic/Lymphatic/Immunologic: negative  Endocrine: negative      O:   NAD, WDWN, Alert & Oriented, Mood & Affect wnl, Vitals stable   Here today alone   General appearance merced ii   Vitals stable   Alert, oriented and in no acute distress   R helix skin colored plaque      Eyes:  Conjunctivae/lids:Normal     ENT: Lips, buccal mucosa, tongue: normal    MSK:Normal    Cardiovascular: peripheral edema none    Pulm: Breathing Normal    Neuro/Psych: Orientation:Normal; Mood/Affect:Normal      A/P:  1. Keloid  Il tac, excision , excision + radiation discussed with patient   Recurrence discussed with patient   Schedule excision patint wants excised  It was a pleasure speaking to Meme Mccurdy today.  Previous clinic  notes and pertinent laboratory tests were reviewed prior to Meme Mccurdy's visit.  Nature of benign skin lesions dicussed with patient.        Again, thank you for allowing me to participate in the care of your patient.        Sincerely,        Andrey Aponte MD

## 2023-04-03 ENCOUNTER — OFFICE VISIT (OUTPATIENT)
Dept: DERMATOLOGY | Facility: CLINIC | Age: 28
End: 2023-04-03
Payer: COMMERCIAL

## 2023-04-03 DIAGNOSIS — L91.0 KELOID SCAR: ICD-10-CM

## 2023-04-03 PROCEDURE — 88305 TISSUE EXAM BY PATHOLOGIST: CPT | Performed by: PATHOLOGY

## 2023-04-03 PROCEDURE — 11443 EXC FACE-MM B9+MARG 2.1-3 CM: CPT | Performed by: DERMATOLOGY

## 2023-04-03 NOTE — PATIENT INSTRUCTIONS
6 WEEK APPOINTMENT FOR STEROID INJECTION WITH DR. FRANKLIN      Open Wound Care     for _______EAR_______        No strenuous activity for 48 hours    Take Tylenol as needed for discomfort.                                                .         Do not drink alcoholic beverages for 48 hours.    Keep the pressure bandage in place for 24-48 hours. If the bandage becomes blood tinged or loose, reinforce it with gauze and tape.        (Refer to the reverse side of this page for management of bleeding).    Remove bandage in 24-48 hours and begin wound care as follows:     Clean area with tap water using a Q tip or gauze pad, (shower / bathe normally)  Dry wound with Q tip or gauze pad  Apply Aquaphor, Vaseline, Polysporin or Bacitracin Ointment with a Q tip  Do NOT use Neosporin Ointment *  Cover the wound with a band-aid or nonstick gauze pad and paper tape.  Repeat wound care once a day until wound is completely healed.    It is an old wives tale that a wound heals better when it is exposed to air and allowed to dry out. The wound will heal faster with a better cosmetic result if it is kept moist with ointment and covered with a bandage.  Do not let the wound dry out.      Supplies Needed:                Qtips or gauze pads                Polysporin or Bacitracin Ointment                Bandaids or nonstick gauze pads and paper tape    Wound care kits and brown paper tape are available for purchase at   the pharmacy.    BLEEDING:    Use tightly rolled up gauze or cloth to apply direct pressure over the bandage for 20   minutes.  Reapply pressure for an additional 20 minutes if necessary  Call the office or go to the nearest emergency room if pressure fails to stop the bleeding.  Use additional gauze and tape to maintain pressure once the bleeding has stopped.  Begin wound care 24 hours after surgery as directed.                  WOUND HEALING    One week after surgery a pink / red halo will form around the outside of  the wound.   This is new skin.  The center of the wound will appear yellowish white and produce some drainage.  The pink halo will slowly migrate in toward the center of the wound until the wound is covered with new shiny pink skin.  There will be no more drainage when the wound is completely healed.  It will take six months to one year for the redness to fade.  The scar may be itchy, tight and sensitive to extreme temperatures for a year after the surgery.  Massaging the area several times a day for several minutes after the wound is completely healed will help the scar soften and normalize faster. Begin massage only after healing is complete.      In case of emergency call: Dr Aponte: [phone]    [facility]: [phone]    [facility]:[phone]

## 2023-04-03 NOTE — LETTER
4/3/2023         RE: Meme Mccurdy  [address] [facility] [address]        Dear Colleague,    Thank you for referring your patient, Meme Mccurdy, to the [facility]. Please see a copy of my visit note below.    Meme Mccurdy is an extremely pleasant 28 year old year old female patient here today for evaluation and managment of keloid on right helix.  Patient has no other skin complaints today.  Remainder of the HPI, Meds, PMH, Allergies, FH, and SH was reviewed in chart.      Past Medical History:   Diagnosis Date     Allergic rhinitis      Allergies     dog     Asthma 9/24/2021     BMI (body mass index), pediatric, 85% to less than 95% for age 1/3/2013     Chronic GERD      Depressive disorder 3/2015     Eczema      Melanocytic nevus of face 12/2009     Melanocytic nevus of face        Past Surgical History:   Procedure Laterality Date     ENT SURGERY  12/19/2016     HC TOOTH EXTRACTION W/FORCEP  09/2008     TONSILLECTOMY Bilateral 12/19/2016    Procedure: TONSILLECTOMY;  Surgeon: Ilya Montilla MD;  Location:  OR        Family History   Problem Relation Age of Onset     Hypertension Mother      Arthritis Mother      Lipids Father         High Cholesterol     Breast Cancer Maternal Grandmother      Cancer Maternal Grandmother      Diabetes Maternal Grandfather      Hypertension Maternal Grandfather      Cerebrovascular Disease Other      Thyroid Disease No family hx of      Glaucoma No family hx of      Macular Degeneration No family hx of        Social History     Socioeconomic History     Marital status: Single     Spouse name: Not on file     Number of children: Not on file     Years of education: Not on file     Highest education level: Not on file   Occupational History     Not on file   Tobacco Use     Smoking status: Never     Smokeless tobacco: Never   Vaping Use     Vaping status: Never Used   Substance and Sexual Activity     Alcohol use: No     Drug use: No      Sexual activity: Yes     Partners: Male     Birth control/protection: Injection   Other Topics Concern     Parent/sibling w/ CABG, MI or angioplasty before 65F 55M? No   Social History Narrative     Not on file     Social Determinants of Health     Financial Resource Strain: Not on file   Food Insecurity: Not on file   Transportation Needs: Not on file   Physical Activity: Not on file   Stress: Not on file   Social Connections: Not on file   Intimate Partner Violence: Not on file   Housing Stability: Not on file       Outpatient Encounter Medications as of 4/3/2023   Medication Sig Dispense Refill     albuterol (PROAIR HFA/PROVENTIL HFA/VENTOLIN HFA) 108 (90 Base) MCG/ACT inhaler Inhale 2 puffs into the lungs every 6 hours 18 g 1     sertraline (ZOLOFT) 100 MG tablet Take 2 tablets (200 mg) by mouth daily 180 tablet 3     Facility-Administered Encounter Medications as of 4/3/2023   Medication Dose Route Frequency Provider Last Rate Last Admin     medroxyPROGESTERone (DEPO-PROVERA) syringe 150 mg  150 mg Intramuscular Q90 Days Jazmin Olivares PA-C   150 mg at 01/19/23 1403             O:   NAD, WDWN, Alert & Oriented, Mood & Affect wnl, Vitals stable   Here today alone    General appearance normal   Vitals stable   Alert, oriented and in no acute distress     1.8cm skin colored plaque R post ear      Eyes: Conjunctivae/lids:Normal     ENT: Lips, buccal mucosa, tongue: normal    MSK:Normal    Cardiovascular: peripheral edema none    Pulm: Breathing Normal    Neuro/Psych: Orientation:Alert and Orientedx3 ; Mood/Affect:normal       A/P:  1. Keloid  Scar, recurrence, il tac and radiation discussed with patient   EXCISION OF BENIGN LESION : After thorough discussion of PGACAC, consent obtained, anesthesia and prep, the margins of the lesion were identified and an incision was made encompassing the lesion 2mm margin size 2.2cm.  The incisions were made through the skin and down to and including the  subcutaneous tissue. The lesion was removed en bloc and submitted for perm  pathologic review. hemostasis was achieved.   REPAIR SECOND INTENT: We discussed the options for wound management in full with the patient including risks/benefits/possible outcomes. Decision made to allow the wound to heal by second intention. Cautery was used for for hemostasis. EBL minimal; complications none; wound care routine.  The patient was discharged in good condition and will return in one month or prn for wound evaluation.  It was a pleasure speaking to Meme Mccurdy today.  Return to clinic 6 weeks      Again, thank you for allowing me to participate in the care of your patient.        Sincerely,        Andrey Aponte MD

## 2023-04-03 NOTE — PROGRESS NOTES
Meme Mccurdy is an extremely pleasant 28 year old year old female patient here today for evaluation and managment of keloid on right helix.  Patient has no other skin complaints today.  Remainder of the HPI, Meds, PMH, Allergies, FH, and SH was reviewed in chart.      Past Medical History:   Diagnosis Date     Allergic rhinitis      Allergies     dog     Asthma 9/24/2021     BMI (body mass index), pediatric, 85% to less than 95% for age 1/3/2013     Chronic GERD      Depressive disorder 3/2015     Eczema      Melanocytic nevus of face 12/2009     Melanocytic nevus of face        Past Surgical History:   Procedure Laterality Date     ENT SURGERY  12/19/2016     HC TOOTH EXTRACTION W/FORCEP  09/2008     TONSILLECTOMY Bilateral 12/19/2016    Procedure: TONSILLECTOMY;  Surgeon: Ilya Montilla MD;  Location: [facility] OR        Family History   Problem Relation Age of Onset     Hypertension Mother      Arthritis Mother      Lipids Father         High Cholesterol     Breast Cancer Maternal Grandmother      Cancer Maternal Grandmother      Diabetes Maternal Grandfather      Hypertension Maternal Grandfather      Cerebrovascular Disease Other      Thyroid Disease No family hx of      Glaucoma No family hx of      Macular Degeneration No family hx of        Social History     Socioeconomic History     Marital status: Single     Spouse name: Not on file     Number of children: Not on file     Years of education: Not on file     Highest education level: Not on file   Occupational History     Not on file   Tobacco Use     Smoking status: Never     Smokeless tobacco: Never   Vaping Use     Vaping status: Never Used   Substance and Sexual Activity     Alcohol use: No     Drug use: No     Sexual activity: Yes     Partners: Male     Birth control/protection: Injection   Other Topics Concern     Parent/sibling w/ CABG, MI or angioplasty before 65F 55M? No   Social History Narrative     Not on file     Social Determinants of Health      Financial Resource Strain: Not on file   Food Insecurity: Not on file   Transportation Needs: Not on file   Physical Activity: Not on file   Stress: Not on file   Social Connections: Not on file   Intimate Partner Violence: Not on file   Housing Stability: Not on file       Outpatient Encounter Medications as of 4/3/2023   Medication Sig Dispense Refill     albuterol (PROAIR HFA/PROVENTIL HFA/VENTOLIN HFA) 108 (90 Base) MCG/ACT inhaler Inhale 2 puffs into the lungs every 6 hours 18 g 1     sertraline (ZOLOFT) 100 MG tablet Take 2 tablets (200 mg) by mouth daily 180 tablet 3     Facility-Administered Encounter Medications as of 4/3/2023   Medication Dose Route Frequency Provider Last Rate Last Admin     medroxyPROGESTERone (DEPO-PROVERA) syringe 150 mg  150 mg Intramuscular Q90 Days Jazmin Olivares PA-C   150 mg at 01/19/23 1403             O:   NAD, WDWN, Alert & Oriented, Mood & Affect wnl, Vitals stable   Here today alone    General appearance normal   Vitals stable   Alert, oriented and in no acute distress     1.8cm skin colored plaque R post ear      Eyes: Conjunctivae/lids:Normal     ENT: Lips, buccal mucosa, tongue: normal    MSK:Normal    Cardiovascular: peripheral edema none    Pulm: Breathing Normal    Neuro/Psych: Orientation:Alert and Orientedx3 ; Mood/Affect:normal       A/P:  1. Keloid  Scar, recurrence, il tac and radiation discussed with patient   EXCISION OF BENIGN LESION : After thorough discussion of PGACAC, consent obtained, anesthesia and prep, the margins of the lesion were identified and an incision was made encompassing the lesion 2mm margin size 2.2cm.  The incisions were made through the skin and down to and including the subcutaneous tissue. The lesion was removed en bloc and submitted for perm  pathologic review. hemostasis was achieved.   REPAIR SECOND INTENT: We discussed the options for wound management in full with the patient including risks/benefits/possible outcomes.  Decision made to allow the wound to heal by second intention. Cautery was used for for hemostasis. EBL minimal; complications none; wound care routine.  The patient was discharged in good condition and will return in one month or prn for wound evaluation.  It was a pleasure speaking to Meme Mccurdy today.  Return to clinic 6 weeks

## 2023-04-04 LAB
PATH REPORT.COMMENTS IMP SPEC: NORMAL
PATH REPORT.COMMENTS IMP SPEC: NORMAL
PATH REPORT.FINAL DX SPEC: NORMAL
PATH REPORT.GROSS SPEC: NORMAL
PATH REPORT.MICROSCOPIC SPEC OTHER STN: NORMAL
PATH REPORT.RELEVANT HX SPEC: NORMAL

## 2023-04-18 ENCOUNTER — ALLIED HEALTH/NURSE VISIT (OUTPATIENT)
Dept: FAMILY MEDICINE | Facility: CLINIC | Age: 28
End: 2023-04-18
Payer: COMMERCIAL

## 2023-04-18 DIAGNOSIS — Z30.42 ENCOUNTER FOR SURVEILLANCE OF INJECTABLE CONTRACEPTIVE: Primary | ICD-10-CM

## 2023-04-18 PROCEDURE — 99207 PR NO CHARGE NURSE ONLY: CPT

## 2023-04-18 PROCEDURE — 96372 THER/PROPH/DIAG INJ SC/IM: CPT | Performed by: PHYSICIAN ASSISTANT

## 2023-04-18 RX ADMIN — MEDROXYPROGESTERONE ACETATE 150 MG: 150 INJECTION, SUSPENSION INTRAMUSCULAR at 11:16

## 2023-04-18 NOTE — NURSING NOTE
The following medication was given:     MEDICATION: Depo Provera 150mg  ROUTE: IM  SITE: Ventrogluteal - Right  DOSE: 1ML  LOT #: [id number]  :  Marii  EXPIRATION DATE:  05/01/2024  NDC#: 45444-548-79  Kristen Kehr P.A.  Pt was given reminder card and told to wait 15-20 mins after inj. Guillermo Li MA

## 2023-05-15 ENCOUNTER — OFFICE VISIT (OUTPATIENT)
Dept: DERMATOLOGY | Facility: CLINIC | Age: 28
End: 2023-05-15
Payer: COMMERCIAL

## 2023-05-15 DIAGNOSIS — L91.0 KELOID: Primary | ICD-10-CM

## 2023-05-15 PROCEDURE — 11900 INJECT SKIN LESIONS </W 7: CPT | Performed by: DERMATOLOGY

## 2023-05-15 PROCEDURE — 99212 OFFICE O/P EST SF 10 MIN: CPT | Mod: 25 | Performed by: DERMATOLOGY

## 2023-05-15 RX ORDER — TRIAMCINOLONE ACETONIDE 40 MG/ML
4 INJECTION, SUSPENSION INTRA-ARTICULAR; INTRAMUSCULAR ONCE
Status: COMPLETED | OUTPATIENT
Start: 2023-05-15 | End: 2023-05-15

## 2023-05-15 RX ADMIN — TRIAMCINOLONE ACETONIDE 4 MG: 40 INJECTION, SUSPENSION INTRA-ARTICULAR; INTRAMUSCULAR at 08:37

## 2023-05-15 ASSESSMENT — PAIN SCALES - GENERAL: PAINLEVEL: NO PAIN (0)

## 2023-05-15 NOTE — LETTER
5/15/2023         RE: Meme Mccurdy  [id number] [facility] [id number]        Dear Colleague,    Thank you for referring your patient, Meme Mccurdy, to the [facility]. Please see a copy of my visit note below.    Meme Mccurdy is an extremely pleasant 28 year old year old female patient here today for f/u keloid doing well.  Patient has no other skin complaints today.  Remainder of the HPI, Meds, PMH, Allergies, FH, and SH was reviewed in chart.      Past Medical History:   Diagnosis Date     Allergic rhinitis      Allergies     dog     Asthma 9/24/2021     BMI (body mass index), pediatric, 85% to less than 95% for age 1/3/2013     Chronic GERD      Depressive disorder 3/2015     Eczema      Melanocytic nevus of face 12/2009     Melanocytic nevus of face        Past Surgical History:   Procedure Laterality Date     ENT SURGERY  12/19/2016     HC TOOTH EXTRACTION W/FORCEP  09/2008     TONSILLECTOMY Bilateral 12/19/2016    Procedure: TONSILLECTOMY;  Surgeon: Ilya Montilla MD;  Location:  OR        Family History   Problem Relation Age of Onset     Hypertension Mother      Arthritis Mother      Lipids Father         High Cholesterol     Breast Cancer Maternal Grandmother      Cancer Maternal Grandmother      Diabetes Maternal Grandfather      Hypertension Maternal Grandfather      Cerebrovascular Disease Other      Thyroid Disease No family hx of      Glaucoma No family hx of      Macular Degeneration No family hx of        Social History     Socioeconomic History     Marital status: Single     Spouse name: Not on file     Number of children: Not on file     Years of education: Not on file     Highest education level: Not on file   Occupational History     Not on file   Tobacco Use     Smoking status: Never     Smokeless tobacco: Never   Vaping Use     Vaping status: Never Used   Substance and Sexual Activity     Alcohol use: No     Drug use: No     Sexual activity: Yes      Partners: Male     Birth control/protection: Injection   Other Topics Concern     Parent/sibling w/ CABG, MI or angioplasty before 65F 55M? No   Social History Narrative     Not on file     Social Determinants of Health     Financial Resource Strain: Not on file   Food Insecurity: Not on file   Transportation Needs: Not on file   Physical Activity: Not on file   Stress: Not on file   Social Connections: Not on file   Intimate Partner Violence: Not on file   Housing Stability: Not on file       Outpatient Encounter Medications as of 5/15/2023   Medication Sig Dispense Refill     albuterol (PROAIR HFA/PROVENTIL HFA/VENTOLIN HFA) 108 (90 Base) MCG/ACT inhaler Inhale 2 puffs into the lungs every 6 hours 18 g 1     sertraline (ZOLOFT) 100 MG tablet Take 2 tablets (200 mg) by mouth daily 180 tablet 3     Facility-Administered Encounter Medications as of 5/15/2023   Medication Dose Route Frequency Provider Last Rate Last Admin     medroxyPROGESTERone (DEPO-PROVERA) syringe 150 mg  150 mg Intramuscular Q90 Days Jazmin Olivares PA-C   150 mg at 04/18/23 1116             O:   NAD, WDWN, Alert & Oriented, Mood & Affect wnl, Vitals stable   Here today alone    General appearance normal   Vitals stable   Alert, oriented and in no acute distress     R helix well healed site      Eyes: Conjunctivae/lids:Normal     ENT: Lips, buccal mucosa, tongue: normal    MSK:Normal    Cardiovascular: peripheral edema none    Pulm: Breathing Normal    Neuro/Psych: Orientation:Alert and Orientedx3 ; Mood/Affect:normal       A/P:  1. Keloid no evidence of recurrence   IL TAC: PGACAC discussed.  Risks including but not limited to injection site reaction, bruising, no resolution.  All questions answered and entertained to patient s satisfaction.  Informed consent obtained.  IL TAC in concentration of 40mg/ml was injected ID to R helix.  Total injected was  0.1 ml.  Patient tolerated without complications and given wound care  instructions, including not to move product around.  Return to clinic 2 months  It was a pleasure speaking to Meme Mccurdy today.        Again, thank you for allowing me to participate in the care of your patient.        Sincerely,        Andrey Aponte MD

## 2023-05-15 NOTE — PROGRESS NOTES
Meme Mccurdy is an extremely pleasant 28 year old year old female patient here today for f/u keloid doing well.  Patient has no other skin complaints today.  Remainder of the HPI, Meds, PMH, Allergies, FH, and SH was reviewed in chart.      Past Medical History:   Diagnosis Date     Allergic rhinitis      Allergies     dog     Asthma 9/24/2021     BMI (body mass index), pediatric, 85% to less than 95% for age 1/3/2013     Chronic GERD      Depressive disorder 3/2015     Eczema      Melanocytic nevus of face 12/2009     Melanocytic nevus of face        Past Surgical History:   Procedure Laterality Date     ENT SURGERY  12/19/2016     HC TOOTH EXTRACTION W/FORCEP  09/2008     TONSILLECTOMY Bilateral 12/19/2016    Procedure: TONSILLECTOMY;  Surgeon: Ilya Montilla MD;  Location: [facility] OR        Family History   Problem Relation Age of Onset     Hypertension Mother      Arthritis Mother      Lipids Father         High Cholesterol     Breast Cancer Maternal Grandmother      Cancer Maternal Grandmother      Diabetes Maternal Grandfather      Hypertension Maternal Grandfather      Cerebrovascular Disease Other      Thyroid Disease No family hx of      Glaucoma No family hx of      Macular Degeneration No family hx of        Social History     Socioeconomic History     Marital status: Single     Spouse name: Not on file     Number of children: Not on file     Years of education: Not on file     Highest education level: Not on file   Occupational History     Not on file   Tobacco Use     Smoking status: Never     Smokeless tobacco: Never   Vaping Use     Vaping status: Never Used   Substance and Sexual Activity     Alcohol use: No     Drug use: No     Sexual activity: Yes     Partners: Male     Birth control/protection: Injection   Other Topics Concern     Parent/sibling w/ CABG, MI or angioplasty before 65F 55M? No   Social History Narrative     Not on file     Social Determinants of Health     Financial Resource  Strain: Not on file   Food Insecurity: Not on file   Transportation Needs: Not on file   Physical Activity: Not on file   Stress: Not on file   Social Connections: Not on file   Intimate Partner Violence: Not on file   Housing Stability: Not on file       Outpatient Encounter Medications as of 5/15/2023   Medication Sig Dispense Refill     albuterol (PROAIR HFA/PROVENTIL HFA/VENTOLIN HFA) 108 (90 Base) MCG/ACT inhaler Inhale 2 puffs into the lungs every 6 hours 18 g 1     sertraline (ZOLOFT) 100 MG tablet Take 2 tablets (200 mg) by mouth daily 180 tablet 3     Facility-Administered Encounter Medications as of 5/15/2023   Medication Dose Route Frequency Provider Last Rate Last Admin     medroxyPROGESTERone (DEPO-PROVERA) syringe 150 mg  150 mg Intramuscular Q90 Days Jazmin Olivares PA-C   150 mg at 04/18/23 1116             O:   NAD, WDWN, Alert & Oriented, Mood & Affect wnl, Vitals stable   Here today alone    General appearance normal   Vitals stable   Alert, oriented and in no acute distress     R helix well healed site      Eyes: Conjunctivae/lids:Normal     ENT: Lips, buccal mucosa, tongue: normal    MSK:Normal    Cardiovascular: peripheral edema none    Pulm: Breathing Normal    Neuro/Psych: Orientation:Alert and Orientedx3 ; Mood/Affect:normal       A/P:  1. Keloid no evidence of recurrence   IL TAC: PGACAC discussed.  Risks including but not limited to injection site reaction, bruising, no resolution.  All questions answered and entertained to patient s satisfaction.  Informed consent obtained.  IL TAC in concentration of 40mg/ml was injected ID to R helix.  Total injected was  0.1 ml.  Patient tolerated without complications and given wound care instructions, including not to move product around.  Return to clinic 2 months  It was a pleasure speaking to Meme Mccurdy today.

## 2023-05-15 NOTE — NURSING NOTE
Chief Complaint   Patient presents with     Derm Problem     Keloid injection in right ear        There were no vitals filed for this visit.  Wt Readings from Last 1 Encounters:   07/29/22 106.6 kg (235 lb)       Naina Vela LPN .................5/15/2023

## 2023-06-29 ENCOUNTER — PATIENT OUTREACH (OUTPATIENT)
Dept: CARE COORDINATION | Facility: CLINIC | Age: 28
End: 2023-06-29
Payer: COMMERCIAL

## 2023-07-13 ENCOUNTER — PATIENT OUTREACH (OUTPATIENT)
Dept: CARE COORDINATION | Facility: CLINIC | Age: 28
End: 2023-07-13
Payer: COMMERCIAL

## 2023-07-19 ENCOUNTER — ALLIED HEALTH/NURSE VISIT (OUTPATIENT)
Dept: FAMILY MEDICINE | Facility: CLINIC | Age: 28
End: 2023-07-19
Payer: COMMERCIAL

## 2023-07-19 DIAGNOSIS — Z30.42 ENCOUNTER FOR SURVEILLANCE OF INJECTABLE CONTRACEPTIVE: Primary | ICD-10-CM

## 2023-07-19 PROCEDURE — 96372 THER/PROPH/DIAG INJ SC/IM: CPT | Performed by: PHYSICIAN ASSISTANT

## 2023-07-19 PROCEDURE — 99207 PR NO CHARGE NURSE ONLY: CPT

## 2023-07-19 RX ADMIN — MEDROXYPROGESTERONE ACETATE 150 MG: 150 INJECTION, SUSPENSION INTRAMUSCULAR at 10:55

## 2023-07-19 NOTE — PROGRESS NOTES
Clinic Administered Medication Documentation      Depo Provera Documentation    Depo-Provera Standing Order inclusion/exclusion criteria reviewed. Chioma Rahman MA :57 AM      Is this the initial or subsequent dose of Depo Provera? Subsequent dose - patient is within the acceptable window of time (11-15 weeks) for subsequent injection. Pregnancy test not indicated.    Patient meets: inclusion criteria     Is there an active order (written within the past 365 days, with administrations remaining, not ) in the chart? Yes.     Prior to injection, verified patient identity using patient's name and date of birth. Medication was administered. Please see MAR and medication order for additional information.     Vial/Syringe: Single dose vial. Was entire vial of medication used? Yes    Patient instructed to remain in clinic for 15 minutes and report any adverse reaction to staff immediately but patient declined.  NEXT INJECTION DUE: 10/4/23 - 23    Patient has no refills remaining. Patient will call and set up an appointment with new provider as old provider is no longer with clinic.   Chioma Rahman MA :57 AM

## 2023-10-31 ENCOUNTER — ALLIED HEALTH/NURSE VISIT (OUTPATIENT)
Dept: FAMILY MEDICINE | Facility: CLINIC | Age: 28
End: 2023-10-31
Payer: COMMERCIAL

## 2023-10-31 DIAGNOSIS — Z30.42 ENCOUNTER FOR DEPO-PROVERA CONTRACEPTION: ICD-10-CM

## 2023-10-31 DIAGNOSIS — Z23 ENCOUNTER FOR IMMUNIZATION: Primary | ICD-10-CM

## 2023-10-31 PROCEDURE — 90686 IIV4 VACC NO PRSV 0.5 ML IM: CPT

## 2023-10-31 PROCEDURE — 90471 IMMUNIZATION ADMIN: CPT

## 2023-10-31 PROCEDURE — 96372 THER/PROPH/DIAG INJ SC/IM: CPT | Performed by: PHYSICIAN ASSISTANT

## 2023-10-31 PROCEDURE — 99207 PR NO CHARGE NURSE ONLY: CPT

## 2023-10-31 RX ADMIN — MEDROXYPROGESTERONE ACETATE 150 MG: 150 INJECTION, SUSPENSION INTRAMUSCULAR at 15:01

## 2023-10-31 NOTE — PROGRESS NOTES
Prior to immunization administration, verified patients identity using patient s name and date of birth. Please see Immunization Activity for additional information.     Screening Questionnaire for Adult Immunization    Are you sick today?   No   Do you have allergies to medications, food, a vaccine component or latex?   No   Have you ever had a serious reaction after receiving a vaccination?   No   Do you have a long-term health problem with heart, lung, kidney, or metabolic disease (e.g., diabetes), asthma, a blood disorder, no spleen, complement component deficiency, a cochlear implant, or a spinal fluid leak?  Are you on long-term aspirin therapy?   No   Do you have cancer, leukemia, HIV/AIDS, or any other immune system problem?   No   Do you have a parent, brother, or sister with an immune system problem?   No   In the past 3 months, have you taken medications that affect  your immune system, such as prednisone, other steroids, or anticancer drugs; drugs for the treatment of rheumatoid arthritis, Crohn s disease, or psoriasis; or have you had radiation treatments?   No   Have you had a seizure, or a brain or other nervous system problem?   No   During the past year, have you received a transfusion of blood or blood    products, or been given immune (gamma) globulin or antiviral drug?   No   For women: Are you pregnant or is there a chance you could become       pregnant during the next month?   No   Have you received any vaccinations in the past 4 weeks?   No     Immunization questionnaire answers were all negative.    I have reviewed the following standing orders:   This patient is due and qualifies for the Influenza vaccine.    Click here for Influenza Vaccine Standing Order    I have reviewed the vaccines inclusion and exclusion criteria; No concerns regarding eligibility.     Patient instructed to remain in clinic for 15 minutes afterwards, and to report any adverse reactions.     Screening performed by  Jose Armando CARBAJAL LPN on 10/31/2023 at 2:51 PM.

## 2023-10-31 NOTE — PROGRESS NOTES
The following medication was given:   Clinic Administered Medication Documentation      MEDICATION:  Depo Provera 150mg  ROUTE: IM  SITE: Ventrogluteal - Left  DOSE: 150mg  LOT #: [id number]   : Marii   EXPIRATION DATE: 2025   NDC#: 67973-847-13    Was there drug waste? No  Multi-dose vial: No      Depo-Provera Standing Order inclusion/exclusion criteria reviewed.     Is this the initial or subsequent dose of Depo Provera? Subsequent dose - patient is within the acceptable window of time (11-15 weeks) for subsequent injection. Pregnancy test not indicated.    Patient meets: inclusion criteria     Is there an active order (written within the past 365 days, with administrations remaining, not ) in the chart? Yes.     Prior to injection, verified patient identity using patient's name and date of birth. Medication was administered. Please see MAR and medication order for additional information.     Vial/Syringe: Single dose vial. Was entire vial of medication used? Yes    Patient instructed to remain in clinic for 15 minutes and report any adverse reaction to staff immediately.  NEXT INJECTION DUE: 24 - 24    Verified/Notified that the patient has 3 refills remaining in their prescription.      Jose Armando CARBAJAL LPN   [facility]   Pediatric/Adult Family Practice   10/31/23 at 2:55 PM

## 2023-12-02 ENCOUNTER — HEALTH MAINTENANCE LETTER (OUTPATIENT)
Age: 28
End: 2023-12-02

## 2024-02-07 ENCOUNTER — ALLIED HEALTH/NURSE VISIT (OUTPATIENT)
Dept: FAMILY MEDICINE | Facility: CLINIC | Age: 29
End: 2024-02-07
Payer: COMMERCIAL

## 2024-02-07 DIAGNOSIS — Z30.013 ENCOUNTER FOR INITIAL PRESCRIPTION OF INJECTABLE CONTRACEPTIVE: Primary | ICD-10-CM

## 2024-02-07 PROCEDURE — 99207 PR NO CHARGE NURSE ONLY: CPT

## 2024-02-07 PROCEDURE — 96372 THER/PROPH/DIAG INJ SC/IM: CPT | Performed by: PHYSICIAN ASSISTANT

## 2024-02-07 RX ADMIN — MEDROXYPROGESTERONE ACETATE 150 MG: 150 INJECTION, SUSPENSION INTRAMUSCULAR at 15:27

## 2024-02-07 NOTE — NURSING NOTE
Clinic Administered Medication Documentation        Patient was given Depo Provera. Prior to medication administration, verified patient's identity using patient s name and date of birth. Please see MAR and medication order for additional information. Patient instructed to remain in clinic for 15 minutes and report any adverse reaction to staff immediately but patient declined.    Vial/Syringe: Single dose vial. Was entire vial of medication used? Yes    NEXT INJECTION DUE: 4/24/24 - 5/22/24

## 2024-03-27 SDOH — HEALTH STABILITY: PHYSICAL HEALTH: ON AVERAGE, HOW MANY MINUTES DO YOU ENGAGE IN EXERCISE AT THIS LEVEL?: 60 MIN

## 2024-03-27 SDOH — HEALTH STABILITY: PHYSICAL HEALTH: ON AVERAGE, HOW MANY DAYS PER WEEK DO YOU ENGAGE IN MODERATE TO STRENUOUS EXERCISE (LIKE A BRISK WALK)?: 4 DAYS

## 2024-03-27 ASSESSMENT — SOCIAL DETERMINANTS OF HEALTH (SDOH): HOW OFTEN DO YOU GET TOGETHER WITH FRIENDS OR RELATIVES?: MORE THAN THREE TIMES A WEEK

## 2024-04-03 ENCOUNTER — OFFICE VISIT (OUTPATIENT)
Dept: FAMILY MEDICINE | Facility: CLINIC | Age: 29
End: 2024-04-03
Payer: COMMERCIAL

## 2024-04-03 VITALS
TEMPERATURE: 96.7 F | HEIGHT: 67 IN | DIASTOLIC BLOOD PRESSURE: 86 MMHG | OXYGEN SATURATION: 98 % | SYSTOLIC BLOOD PRESSURE: 136 MMHG | WEIGHT: 250 LBS | HEART RATE: 52 BPM | BODY MASS INDEX: 39.24 KG/M2 | RESPIRATION RATE: 16 BRPM

## 2024-04-03 DIAGNOSIS — Z12.4 CERVICAL CANCER SCREENING: ICD-10-CM

## 2024-04-03 DIAGNOSIS — Z00.00 ROUTINE GENERAL MEDICAL EXAMINATION AT A HEALTH CARE FACILITY: Primary | ICD-10-CM

## 2024-04-03 DIAGNOSIS — J45.20 MILD INTERMITTENT ASTHMA WITHOUT COMPLICATION: ICD-10-CM

## 2024-04-03 DIAGNOSIS — F41.8 DEPRESSION WITH ANXIETY: ICD-10-CM

## 2024-04-03 PROCEDURE — G0145 SCR C/V CYTO,THINLAYER,RESCR: HCPCS | Performed by: PHYSICIAN ASSISTANT

## 2024-04-03 PROCEDURE — 99213 OFFICE O/P EST LOW 20 MIN: CPT | Mod: 25 | Performed by: PHYSICIAN ASSISTANT

## 2024-04-03 PROCEDURE — 99395 PREV VISIT EST AGE 18-39: CPT | Performed by: PHYSICIAN ASSISTANT

## 2024-04-03 RX ORDER — ALBUTEROL SULFATE 90 UG/1
2 AEROSOL, METERED RESPIRATORY (INHALATION) EVERY 6 HOURS
Qty: 18 G | Refills: 1 | Status: SHIPPED | OUTPATIENT
Start: 2024-04-03

## 2024-04-03 ASSESSMENT — ANXIETY QUESTIONNAIRES
7. FEELING AFRAID AS IF SOMETHING AWFUL MIGHT HAPPEN: MORE THAN HALF THE DAYS
IF YOU CHECKED OFF ANY PROBLEMS ON THIS QUESTIONNAIRE, HOW DIFFICULT HAVE THESE PROBLEMS MADE IT FOR YOU TO DO YOUR WORK, TAKE CARE OF THINGS AT HOME, OR GET ALONG WITH OTHER PEOPLE: VERY DIFFICULT
3. WORRYING TOO MUCH ABOUT DIFFERENT THINGS: MORE THAN HALF THE DAYS
4. TROUBLE RELAXING: MORE THAN HALF THE DAYS
GAD7 TOTAL SCORE: 14
2. NOT BEING ABLE TO STOP OR CONTROL WORRYING: MORE THAN HALF THE DAYS
GAD7 TOTAL SCORE: 14
6. BECOMING EASILY ANNOYED OR IRRITABLE: MORE THAN HALF THE DAYS
1. FEELING NERVOUS, ANXIOUS, OR ON EDGE: NEARLY EVERY DAY
8. IF YOU CHECKED OFF ANY PROBLEMS, HOW DIFFICULT HAVE THESE MADE IT FOR YOU TO DO YOUR WORK, TAKE CARE OF THINGS AT HOME, OR GET ALONG WITH OTHER PEOPLE?: VERY DIFFICULT
7. FEELING AFRAID AS IF SOMETHING AWFUL MIGHT HAPPEN: MORE THAN HALF THE DAYS
5. BEING SO RESTLESS THAT IT IS HARD TO SIT STILL: SEVERAL DAYS

## 2024-04-03 ASSESSMENT — PAIN SCALES - GENERAL: PAINLEVEL: NO PAIN (0)

## 2024-04-03 NOTE — LETTER
My Asthma Action Plan    Name: Meme Mccurdy   YOB: 1995  Date: 4/3/2024   My doctor: Kristen M. Kehr, PA-C   My clinic: [facility]        My Rescue Medicine:   Albuterol inhaler (Proair/Ventolin/Proventil HFA)  2-4 puffs EVERY 4 HOURS as needed. Use a spacer if recommended by your provider.   My Asthma Severity:   Intermittent / Exercise Induced  Know your asthma triggers: upper respiratory infections             GREEN ZONE   Good Control  I feel good  No cough or wheeze  Can work, sleep and play without asthma symptoms       Take your asthma control medicine every day.     If exercise triggers your asthma, take your rescue medication  15 minutes before exercise or sports, and  During exercise if you have asthma symptoms  Spacer to use with inhaler: If you have a spacer, make sure to use it with your inhaler             YELLOW ZONE Getting Worse  I have ANY of these:  I do not feel good  Cough or wheeze  Chest feels tight  Wake up at night   Keep taking your Green Zone medications  Start taking your rescue medicine:  every 20 minutes for up to 1 hour. Then every 4 hours for 24-48 hours.  If you stay in the Yellow Zone for more than 12-24 hours, contact your doctor.  If you do not return to the Green Zone in 12-24 hours or you get worse, start taking your oral steroid medicine if prescribed by your provider.           RED ZONE Medical Alert - Get Help  I have ANY of these:  I feel awful  Medicine is not helping  Breathing getting harder  Trouble walking or talking  Nose opens wide to breathe       Take your rescue medicine NOW  If your provider has prescribed an oral steroid medicine, start taking it NOW  Call your doctor NOW  If you are still in the Red Zone after 20 minutes and you have not reached your doctor:  Take your rescue medicine again and  Call 911 or go to the emergency room right away    See your regular doctor within 2 weeks of an Emergency Room or Urgent Care  visit for follow-up treatment.          Annual Reminders:  Meet with Asthma Educator,  Flu Shot in the Fall, consider Pneumonia Vaccination for patients with asthma (aged 19 and older).    Pharmacy: [id number] - [place] - [phone]    Electronically signed by Kristen M. Kehr, PA-C   Date: 04/03/24                    Asthma Triggers  How To Control Things That Make Your Asthma Worse    Triggers are things that make your asthma worse.  Look at the list below to help you find your triggers and   what you can do about them. You can help prevent asthma flare-ups by staying away from your triggers.      Trigger                                                          What you can do   Cigarette Smoke  Tobacco smoke can make asthma worse. Do not allow smoking in your home, car or around you.  Be sure no one smokes at a child s day care or school.  If you smoke, ask your health care provider for ways to help you quit.  Ask family members to quit too.  Ask your health care provider for a referral to Quit Plan to help you quit smoking, or call 4-154-431-PLAN.     Colds, Flu, Bronchitis  These are common triggers of asthma. Wash your hands often.  Don t touch your eyes, nose or mouth.  Get a flu shot every year.     Dust Mites  These are tiny bugs that live in cloth or carpet. They are too small to see. Wash sheets and blankets in hot water every week.   Encase pillows and mattress in dust mite proof covers.  Avoid having carpet if you can. If you have carpet, vacuum weekly.   Use a dust mask and HEPA vacuum.   Pollen and Outdoor Mold  Some people are allergic to trees, grass, or weed pollen, or molds. Try to keep your windows closed.  Limit time out doors when pollen count is high.   Ask you health care provider about taking medicine during allergy season.     Animal Dander  Some people are allergic to skin flakes, urine or saliva from pets with fur or feathers. Keep pets with fur or feathers out of  your home.    If you can t keep the pet outdoors, then keep the pet out of your bedroom.  Keep the bedroom door closed.  Keep pets off cloth furniture and away from stuffed toys.     Mice, Rats, and Cockroaches  Some people are allergic to the waste from these pests.   Cover food and garbage.  Clean up spills and food crumbs.  Store grease in the refrigerator.   Keep food out of the bedroom.   Indoor Mold  This can be a trigger if your home has high moisture. Fix leaking faucets, pipes, or other sources of water.   Clean moldy surfaces.  Dehumidify basement if it is damp and smelly.   Smoke, Strong Odors, and Sprays  These can reduce air quality. Stay away from strong odors and sprays, such as perfume, powder, hair spray, paints, smoke incense, paint, cleaning products, candles and new carpet.   Exercise or Sports  Some people with asthma have this trigger. Be active!  Ask your doctor about taking medicine before sports or exercise to prevent symptoms.    Warm up for 5-10 minutes before and after sports or exercise.     Other Triggers of Asthma  Cold air:  Cover your nose and mouth with a scarf.  Sometimes laughing or crying can be a trigger.  Some medicines and food can trigger asthma.

## 2024-04-03 NOTE — PROGRESS NOTES
"Preventive Care Visit  [facility] [place] Kristen M. Kehr, PA-C, Family Medicine  Apr 3, 2024      Assessment & Plan     Routine general medical examination at a health care facility  Health maintenance reviewed and updated.  She is on Depo for contraception. She is already scheduled for her next injection. She does not have a menstrual cycle due to depo.       Depression with anxiety  Has decreased her dose to 50  mg and doing well.   She does not need a prescription now, but will send a message when she is due.   Medication list adjusted.   - sertraline (ZOLOFT) 50 MG tablet; Take 1 tablet (50 mg) by mouth daily    Mild intermittent asthma without complication  Albuterol prescription used with URI   - albuterol (PROAIR HFA/PROVENTIL HFA/VENTOLIN HFA) 108 (90 Base) MCG/ACT inhaler; Inhale 2 puffs into the lungs every 6 hours      Cervical cancer screening  - Pap Screen reflex to HPV if ASCUS - recommend age 25 - 29    BMI  Estimated body mass index is 39.16 kg/m  as calculated from the following:    Height as of this encounter: 1.702 m (5' 7\").    Weight as of this encounter: 113.4 kg (250 lb).   Weight management plan: Discussed healthy diet and exercise guidelines    Counseling  Appropriate preventive services were discussed with this patient, including applicable screening as appropriate for fall prevention, nutrition, physical activity, Tobacco-use cessation, weight loss and cognition.  Checklist reviewing preventive services available has been given to the patient.  Reviewed patient's diet, addressing concerns and/or questions.           Penny Valentine is a 29 year old, presenting for the following:  Physical        4/3/2024     7:38 AM   Additional Questions   Roomed by Mike MARTINEZ MA        Health Care Directive  Patient does not have a Health Care Directive or Living Will: Discussed advance care planning with patient; however, patient declined at this time.    POLO Valentine is here today for " preventative care and recheck of the following conditions.     Mild asthma: managed with albuterol, mainly when she has a viral URI.   Depression / anxiety: managed with sertraline 50 mg. She was up to 150 mg last year and has been managing her stress with lifestyle changes and this has also helped moods. She has decreased to 50 mg.               3/27/2024   General Health   How would you rate your overall physical health? Good   Feel stress (tense, anxious, or unable to sleep) To some extent   (!) STRESS CONCERN      3/27/2024   Nutrition   Three or more servings of calcium each day? Yes   Diet: Other   If other, please elaborate: Calorie deficit diet and high protein   How many servings of fruit and vegetables per day? (!) 0-1   How many sweetened beverages each day? 0-1         3/27/2024   Exercise   Days per week of moderate/strenous exercise 4 days   Average minutes spent exercising at this level 60 min         3/27/2024   Social Factors   Frequency of gathering with friends or relatives More than three times a week   Worry food won't last until get money to buy more No   Food not last or not have enough money for food? No   Do you have housing?  Yes   Are you worried about losing your housing? No   Lack of transportation? No   Unable to get utilities (heat,electricity)? No         3/27/2024   Dental   Dentist two times every year? Yes         3/27/2024   TB Screening   Were you born outside of the [place]? No         Today's PHQ-2 Score:       4/3/2024     7:29 AM   PHQ-2 ( 1999 [employer])   Q1: Little interest or pleasure in doing things 1   Q2: Feeling down, depressed or hopeless 1   PHQ-2 Score 2   Q1: Little interest or pleasure in doing things Several days   Q2: Feeling down, depressed or hopeless Several days   PHQ-2 Score 2           3/27/2024   Substance Use   Alcohol more than 3/day or more than 7/wk No   Do you use any other substances recreationally? No     Social History     Tobacco Use    Smoking status:  Never    Smokeless tobacco: Never   Vaping Use    Vaping Use: Never used   Substance Use Topics    Alcohol use: No    Drug use: No           2022   LAST FHS-7 RESULTS   1st degree relative breast or ovarian cancer Unknown   Any relative bilateral breast cancer Unknown   Any male have breast cancer No   Any ONE woman have BOTH breast AND ovarian cancer Yes   Any woman with breast cancer before 50yrs No   2 or more relatives with breast AND/OR ovarian cancer No   2 or more relatives with breast AND/OR bowel cancer No        Mammogram Screening - Patient under 40 years of age: Routine Mammogram Screening not recommended.         3/27/2024   STI Screening   New sexual partner(s) since last STI/HIV test? No     History of abnormal Pap smear: NO - age 21-29 PAP every 3 years recommended  Last 3 Pap and HPV Results:       3/25/2021    12:10 PM 3/19/2018    12:34 PM   PAP / HPV   PAP (Historical) NIL  NIL            3/25/2021    12:10 PM 3/19/2018    12:34 PM   PAP / HPV   PAP (Historical) NIL  NIL            3/27/2024   Contraception/Family Planning   Questions about contraception or family planning No       Reviewed and updated as needed this visit by Provider                    Past Medical History:   Diagnosis Date    Allergic rhinitis     Allergies     dog    Asthma 2021    BMI (body mass index), pediatric, 85% to less than 95% for age 1/3/2013    Chronic GERD     Depressive disorder 3/2015    Eczema     Melanocytic nevus of face 2009    Melanocytic nevus of face      Past Surgical History:   Procedure Laterality Date    ENT SURGERY  2016    HC TOOTH EXTRACTION W/FORCEP  2008    TONSILLECTOMY Bilateral 2016    Procedure: TONSILLECTOMY;  Surgeon: Ilya Montilla MD;  Location: [facility] OR     OB History    Para Term  AB Living   0 0 0 0 0 0   SAB IAB Ectopic Multiple Live Births   0 0 0 0 0     BP Readings from Last 3 Encounters:   24 136/86   23 105/75   22  103/74    Wt Readings from Last 3 Encounters:   04/03/24 113.4 kg (250 lb)   07/29/22 106.6 kg (235 lb)   03/09/22 112.9 kg (249 lb)                  Patient Active Problem List   Diagnosis    Melanocytic nevus of face    Ovarian cyst    Depression with anxiety    Insomnia    Encounter for initial prescription of injectable contraceptive    Asthma    BMI 36.0-36.9,adult     Past Surgical History:   Procedure Laterality Date    ENT SURGERY  12/19/2016    HC TOOTH EXTRACTION W/FORCEP  09/2008    TONSILLECTOMY Bilateral 12/19/2016    Procedure: TONSILLECTOMY;  Surgeon: Ilya Montilla MD;  Location:  OR       Social History     Tobacco Use    Smoking status: Never    Smokeless tobacco: Never   Substance Use Topics    Alcohol use: No     Family History   Problem Relation Age of Onset    Hypertension Mother     Arthritis Mother     Lipids Father         High Cholesterol    Breast Cancer Maternal Grandmother     Cancer Maternal Grandmother     Diabetes Maternal Grandfather     Hypertension Maternal Grandfather     Cerebrovascular Disease Other     Thyroid Disease No family hx of     Glaucoma No family hx of     Macular Degeneration No family hx of          Current Outpatient Medications   Medication Sig Dispense Refill    albuterol (PROAIR HFA/PROVENTIL HFA/VENTOLIN HFA) 108 (90 Base) MCG/ACT inhaler Inhale 2 puffs into the lungs every 6 hours 18 g 1    sertraline (ZOLOFT) 50 MG tablet Take 1 tablet (50 mg) by mouth daily       Allergies   Allergen Reactions    Nkda [No Known Drug Allergy]      Recent Labs   Lab Test 07/29/22  0854 10/09/20  1157   *  --    HDL 48*  --    TRIG 66  --    CR 0.86 0.78   GFRESTIMATED >90 >90   GFRESTBLACK  --  >90   POTASSIUM 3.8 3.8   TSH  --  1.84          Review of Systems  Constitutional, HEENT, cardiovascular, pulmonary, GI, , musculoskeletal, neuro, skin, endocrine and psych systems are negative, except as otherwise noted.     Objective    Exam  /86   Pulse 52    "Temp (!) 96.7  F (35.9  C) (Tympanic)   Resp 16   Ht 1.702 m (5' 7\")   Wt 113.4 kg (250 lb)   LMP  (LMP Unknown)   SpO2 98%   Breastfeeding No   BMI 39.16 kg/m     Estimated body mass index is 39.16 kg/m  as calculated from the following:    Height as of this encounter: 1.702 m (5' 7\").    Weight as of this encounter: 113.4 kg (250 lb).    Physical Exam  GENERAL: alert and no distress  EYES: Eyes grossly normal to inspection, PERRL and conjunctivae and sclerae normal  HENT: ear canals and TM's normal, nose and mouth without ulcers or lesions  NECK: no adenopathy, no asymmetry, masses, or scars  RESP: lungs clear to auscultation - no rales, rhonchi or wheezes  BREAST: normal without masses, tenderness or nipple discharge and no palpable axillary masses or adenopathy  CV: regular rate and rhythm, normal S1 S2, no S3 or S4, no murmur, click or rub, no peripheral edema  ABDOMEN: soft, nontender, no hepatosplenomegaly, no masses and bowel sounds normal   (female) : normal female external genitalia, normal urethral meatus, normal vaginal mucosa, and normal cervix/adnexa/uterus without masses or discharge  MS: no gross musculoskeletal defects noted, no edema  SKIN: no suspicious lesions or rashes  NEURO: Normal strength and tone, mentation intact and speech normal  PSYCH: mentation appears normal, affect normal/bright        Signed Electronically by: Kristen M. Kehr, PA-C    "

## 2024-04-03 NOTE — PATIENT INSTRUCTIONS
Preventive Care Advice   This is general advice given by our system to help you stay healthy. However, your care team may have specific advice just for you. Please talk to your care team about your preventive care needs.  Nutrition  Eat 5 or more servings of fruits and vegetables each day.  Try wheat bread, brown rice and whole grain pasta (instead of white bread, rice, and pasta).  Get enough calcium and vitamin D. Check the label on foods and aim for 100% of the RDA (recommended daily allowance).  Lifestyle  Exercise at least 150 minutes each week   (30 minutes a day, 5 days a week).  Do muscle strengthening activities 2 days a week. These help control your weight and prevent disease.  No smoking.  Wear sunscreen to prevent skin cancer.  Have a dental exam and cleaning every 6 months.  Yearly exams  See your health care team every year to talk about:  Any changes in your health.  Any medicines your care team has prescribed.  Preventive care, family planning, and ways to prevent chronic diseases.  Shots (vaccines)   HPV shots (up to age 26), if you've never had them before.  Hepatitis B shots (up to age 59), if you've never had them before.  COVID-19 shot: Get this shot when it's due.  Flu shot: Get a flu shot every year.  Tetanus shot: Get a tetanus shot every 10 years.  Pneumococcal, hepatitis A, and RSV shots: Ask your care team if you need these based on your risk.  Shingles shot (for age 50 and up).  General health tests  Diabetes screening:  Starting at age 35, Get screened for diabetes at least every 3 years.  If you are younger than age 35, ask your care team if you should be screened for diabetes.  Cholesterol test: At age 39, start having a cholesterol test every 5 years, or more often if advised.  Bone density scan (DEXA): At age 50, ask your care team if you should have this scan for osteoporosis (brittle bones).  Hepatitis C: Get tested at least once in your life.  STIs (sexually transmitted  infections)  Before age 24: Ask your care team if you should be screened for STIs.  After age 24: Get screened for STIs if you're at risk. You are at risk for STIs (including HIV) if:  You are sexually active with more than one person.  You don't use condoms every time.  You or a partner was diagnosed with a sexually transmitted infection.  If you are at risk for HIV, ask about PrEP medicine to prevent HIV.  Get tested for HIV at least once in your life, whether you are at risk for HIV or not.  Cancer screening tests  Cervical cancer screening: If you have a cervix, begin getting regular cervical cancer screening tests at age 21. Most people who have regular screenings with normal results can stop after age 65. Talk about this with your provider.  Breast cancer scan (mammogram): If you've ever had breasts, begin having regular mammograms starting at age 40. This is a scan to check for breast cancer.  Colon cancer screening: It is important to start screening for colon cancer at age 45.  Have a colonoscopy test every 10 years (or more often if you're at risk) Or, ask your provider about stool tests like a FIT test every year or Cologuard test every 3 years.  To learn more about your testing options, visit: https://www.[employer]/199488.pdf.  For help making a decision, visit: https://bit.ly/ca81474.  Prostate cancer screening test: If you have a prostate and are age 55 to 69, ask your provider if you would benefit from a yearly prostate cancer screening test.  Lung cancer screening: If you are a current or former smoker age 50 to 80, ask your care team if ongoing lung cancer screenings are right for you.  For informational purposes only. Not to replace the advice of your health care provider. Copyright   2023 [employer]. All rights reserved. Clinically reviewed by the [facility] Transitions Program. [employer] 375960 - REV 01/24.    Learning About Stress  What is stress?     Stress is your  body's response to a hard situation. Your body can have a physical, emotional, or mental response. Stress is a fact of life for most people, and it affects everyone differently. What causes stress for you may not be stressful for someone else.  A lot of things can cause stress. You may feel stress when you go on a job interview, take a test, or run a race. This kind of short-term stress is normal and even useful. It can help you if you need to work hard or react quickly. For example, stress can help you finish an important job on time.  Long-term stress is caused by ongoing stressful situations or events. Examples of long-term stress include long-term health problems, ongoing problems at work, or conflicts in your family. Long-term stress can harm your health.  How does stress affect your health?  When you are stressed, your body responds as though you are in danger. It makes hormones that speed up your heart, make you breathe faster, and give you a burst of energy. This is called the fight-or-flight stress response. If the stress is over quickly, your body goes back to normal and no harm is done.  But if stress happens too often or lasts too long, it can have bad effects. Long-term stress can make you more likely to get sick, and it can make symptoms of some diseases worse. If you tense up when you are stressed, you may develop neck, shoulder, or low back pain. Stress is linked to high blood pressure and heart disease.  Stress also harms your emotional health. It can make you martínez, tense, or depressed. Your relationships may suffer, and you may not do well at work or school.  What can you do to manage stress?  You can try these things to help manage stress:   Do something active. Exercise or activity can help reduce stress. Walking is a great way to get started. Even everyday activities such as housecleaning or yard work can help.  Try yoga or terri chi. These techniques combine exercise and meditation. You may need  some training at first to learn them.  Do something you enjoy. For example, listen to music or go to a movie. Practice your hobby or do volunteer work.  Meditate. This can help you relax, because you are not worrying about what happened before or what may happen in the future.  Do guided imagery. Imagine yourself in any setting that helps you feel calm. You can use online videos, books, or a teacher to guide you.  Do breathing exercises. For example:  From a standing position, bend forward from the waist with your knees slightly bent. Let your arms dangle close to the floor.  Breathe in slowly and deeply as you return to a standing position. Roll up slowly and lift your head last.  Hold your breath for just a few seconds in the standing position.  Breathe out slowly and bend forward from the waist.  Let your feelings out. Talk, laugh, cry, and express anger when you need to. Talking with supportive friends or family, a counselor, or a elissa leader about your feelings is a healthy way to relieve stress. Avoid discussing your feelings with people who make you feel worse.  Write. It may help to write about things that are bothering you. This helps you find out how much stress you feel and what is causing it. When you know this, you can find better ways to cope.  What can you do to prevent stress?  You might try some of these things to help prevent stress:  Manage your time. This helps you find time to do the things you want and need to do.  Get enough sleep. Your body recovers from the stresses of the day while you are sleeping.  Get support. Your family, friends, and community can make a difference in how you experience stress.  Limit your news feed. Avoid or limit time on social media or news that may make you feel stressed.  Do something active. Exercise or activity can help reduce stress. Walking is a great way to get started.  Where can you learn more?  Go to https://www.healthwise.net/patiented  Enter N032 in the  "search box to learn more about \"Learning About Stress.\"  Current as of: October 24, 2023               Content Version: 14.0    9951-4565 [employer].   Care instructions adapted under license by your healthcare professional. If you have questions about a medical condition or this instruction, always ask your healthcare professional. [employer] disclaims any warranty or liability for your use of this information.      "

## 2024-04-05 LAB
BKR LAB AP GYN ADEQUACY: NORMAL
BKR LAB AP GYN INTERPRETATION: NORMAL
BKR LAB AP HPV REFLEX: NORMAL
BKR LAB AP PREVIOUS ABNORMAL: NORMAL
PATH REPORT.COMMENTS IMP SPEC: NORMAL
PATH REPORT.COMMENTS IMP SPEC: NORMAL
PATH REPORT.RELEVANT HX SPEC: NORMAL

## 2024-06-07 ENCOUNTER — ALLIED HEALTH/NURSE VISIT (OUTPATIENT)
Dept: FAMILY MEDICINE | Facility: CLINIC | Age: 29
End: 2024-06-07
Payer: COMMERCIAL

## 2024-06-07 DIAGNOSIS — Z30.42 ENCOUNTER FOR DEPO-PROVERA CONTRACEPTION: Primary | ICD-10-CM

## 2024-06-07 LAB — HCG UR QL: NEGATIVE

## 2024-06-07 PROCEDURE — 81025 URINE PREGNANCY TEST: CPT

## 2024-06-07 PROCEDURE — 96372 THER/PROPH/DIAG INJ SC/IM: CPT | Performed by: PHYSICIAN ASSISTANT

## 2024-06-07 PROCEDURE — 99207 PR NO CHARGE NURSE ONLY: CPT

## 2024-06-07 RX ADMIN — MEDROXYPROGESTERONE ACETATE 150 MG: 150 INJECTION, SUSPENSION INTRAMUSCULAR at 12:10

## 2024-06-07 NOTE — PROGRESS NOTES
Clinic Administered Medication Documentation      Depo Provera Documentation    Depo-Provera Standing Order inclusion/exclusion criteria reviewed.     Is this the initial or subsequent dose of Depo Provera? Subsequent dose - patient is not within the acceptable window of time (11-15 weeks) for subsequent injection. Pregnancy test is indicated. Pregnancy test result: negative       Patient meets: inclusion criteria     Is there an active order (written within the past 365 days, with administrations remaining, not ) in the chart? Yes.     Prior to injection, verified patient identity using patient's name and date of birth. Medication was administered. Please see MAR and medication order for additional information.     Vial/Syringe: Single dose vial. Was entire vial of medication used? Yes    Patient instructed to remain in clinic for 15 minutes and report any adverse reaction to staff immediately but patient declined.  NEXT INJECTION DUE: 24 - 24    Verified that the patient has 1 refill remaining in their prescription.    Jose Armando Velásquez LPN   [place], [place]  Adult/Pediatric Family Practice     24 at 12:05 PM

## 2024-08-02 ENCOUNTER — MYC REFILL (OUTPATIENT)
Dept: FAMILY MEDICINE | Facility: CLINIC | Age: 29
End: 2024-08-02
Payer: COMMERCIAL

## 2024-08-02 DIAGNOSIS — F41.8 DEPRESSION WITH ANXIETY: ICD-10-CM

## 2024-08-23 ENCOUNTER — ALLIED HEALTH/NURSE VISIT (OUTPATIENT)
Dept: FAMILY MEDICINE | Facility: CLINIC | Age: 29
End: 2024-08-23
Payer: COMMERCIAL

## 2024-08-23 DIAGNOSIS — Z30.42 ENCOUNTER FOR DEPO-PROVERA CONTRACEPTION: Primary | ICD-10-CM

## 2024-08-23 PROCEDURE — 96372 THER/PROPH/DIAG INJ SC/IM: CPT | Performed by: PHYSICIAN ASSISTANT

## 2024-08-23 PROCEDURE — 99207 PR NO CHARGE NURSE ONLY: CPT

## 2024-08-23 RX ADMIN — MEDROXYPROGESTERONE ACETATE 150 MG: 150 INJECTION, SUSPENSION INTRAMUSCULAR at 09:12

## 2024-08-23 NOTE — PROGRESS NOTES
Clinic Administered Medication Documentation      Depo Provera Documentation    Depo-Provera Standing Order inclusion/exclusion criteria reviewed.       Is this the initial or subsequent dose of Depo Provera? Subsequent dose - patient is within the acceptable window of time (11-15 weeks) for subsequent injection. Pregnancy test not indicated.    Patient meets: inclusion criteria     Is there an active order (written within the past 365 days, with administrations remaining, not ) in the chart? Yes.     Prior to injection, verified patient identity using patient's name and date of birth. Medication was administered. Please see MAR and medication order for additional information.     Vial/Syringe: Single dose vial. Was entire vial of medication used? Yes    Patient instructed to remain in clinic for 15 minutes and report any adverse reaction to staff immediately.  NEXT INJECTION DUE: 24 - 24    Patient has no refills remaining.  Patient notified to see provider prior to any future refills.    SUZIE Franks

## 2024-10-30 ENCOUNTER — MYC MEDICAL ADVICE (OUTPATIENT)
Dept: FAMILY MEDICINE | Facility: CLINIC | Age: 29
End: 2024-10-30
Payer: COMMERCIAL

## 2024-10-30 DIAGNOSIS — Z30.42 ENCOUNTER FOR SURVEILLANCE OF INJECTABLE CONTRACEPTIVE: Primary | ICD-10-CM

## 2024-10-31 RX ORDER — MEDROXYPROGESTERONE ACETATE 150 MG/ML
150 INJECTION, SUSPENSION INTRAMUSCULAR
Status: ACTIVE | OUTPATIENT
Start: 2024-11-08 | End: 2025-11-02

## 2024-11-20 ENCOUNTER — ALLIED HEALTH/NURSE VISIT (OUTPATIENT)
Dept: FAMILY MEDICINE | Facility: CLINIC | Age: 29
End: 2024-11-20
Payer: COMMERCIAL

## 2024-11-20 DIAGNOSIS — Z23 ENCOUNTER FOR IMMUNIZATION: Primary | ICD-10-CM

## 2024-11-20 DIAGNOSIS — Z30.40 CONTRACEPTIVE SURVEILLANCE: ICD-10-CM

## 2024-11-20 PROCEDURE — 90656 IIV3 VACC NO PRSV 0.5 ML IM: CPT

## 2024-11-20 PROCEDURE — 90471 IMMUNIZATION ADMIN: CPT

## 2024-11-20 PROCEDURE — 99207 PR NO CHARGE NURSE ONLY: CPT

## 2024-11-20 RX ORDER — MEDROXYPROGESTERONE ACETATE 150 MG/ML
150 INJECTION, SUSPENSION INTRAMUSCULAR
Status: ACTIVE | OUTPATIENT
Start: 2024-11-20

## 2024-11-20 RX ADMIN — MEDROXYPROGESTERONE ACETATE 150 MG: 150 INJECTION, SUSPENSION INTRAMUSCULAR at 09:05

## 2024-11-20 NOTE — PROGRESS NOTES
Prior to immunization administration, verified patients identity using patient s name and date of birth. Please see Immunization Activity for additional information.     Is the patient's temperature normal (100.5 or less)? Yes 96.7    Patient MEETS CRITERIA. PROCEED with vaccine administration.          2024   INFLUENZA   Would you like to receive the flu shot or the nasal flu vaccine today? Flu Shot   Have you had a serious reaction to a flu vaccine or something in a flu vaccine? No   Have you had Guillain-[place]ial[place] syndrome within 6 weeks of getting a vaccine? No   Have you received a bone marrow transplant within the previous 6 months? No            Patient MEETS CRITERIA. PROCEED with vaccine administration.        Patient instructed to remain in clinic for 15 minutes afterwards, and to report any adverse reactions.      Link to Ancillary Visit Immunization Standing Orders SmartSet     Screening performed by Afshan Colunga CMA on 2024 at 8:58 AM.    Clinic Administered Medication Documentation      Depo Provera Documentation    Depo-Provera Standing Order inclusion/exclusion criteria reviewed.     Is this the initial or subsequent dose of Depo Provera? Subsequent dose - patient is within the acceptable window of time (11-15 weeks) for subsequent injection. Pregnancy test not indicated.    Patient meets: inclusion criteria     Is there an active order (written within the past 365 days, with administrations remaining, not ) in the chart? Yes.     Prior to injection, verified patient identity using patient's name and date of birth. Medication was administered. Please see MAR and medication order for additional information.     Vial/Syringe: Single dose vial. Was entire vial of medication used? Yes    Patient instructed to remain in clinic for 15 minutes and report any adverse reaction to staff immediately.  NEXT INJECTION DUE: 25 - 3/5/25    Verified that the patient has refills remaining in  their prescription.

## 2024-11-26 ENCOUNTER — TELEPHONE (OUTPATIENT)
Dept: FAMILY MEDICINE | Facility: CLINIC | Age: 29
End: 2024-11-26
Payer: COMMERCIAL

## 2024-11-26 NOTE — TELEPHONE ENCOUNTER
Please review medication list and remove the duplicate depo order that was placed on 11/20/24 by MA staff.     Thank you,   Jerry Burns, CMA

## 2025-02-19 ENCOUNTER — ALLIED HEALTH/NURSE VISIT (OUTPATIENT)
Dept: FAMILY MEDICINE | Facility: CLINIC | Age: 30
End: 2025-02-19
Payer: COMMERCIAL

## 2025-02-19 DIAGNOSIS — Z30.42 ENCOUNTER FOR SURVEILLANCE OF INJECTABLE CONTRACEPTIVE: ICD-10-CM

## 2025-02-19 DIAGNOSIS — Z30.42 ENCOUNTER FOR DEPO-PROVERA CONTRACEPTION: Primary | ICD-10-CM

## 2025-02-19 PROCEDURE — 96372 THER/PROPH/DIAG INJ SC/IM: CPT | Performed by: PHYSICIAN ASSISTANT

## 2025-02-19 PROCEDURE — 99207 PR NO CHARGE NURSE ONLY: CPT

## 2025-02-19 RX ADMIN — MEDROXYPROGESTERONE ACETATE 150 MG: 150 INJECTION, SUSPENSION INTRAMUSCULAR at 15:27

## 2025-02-19 NOTE — PROGRESS NOTES

## 2025-02-25 ENCOUNTER — MYC REFILL (OUTPATIENT)
Dept: FAMILY MEDICINE | Facility: CLINIC | Age: 30
End: 2025-02-25
Payer: COMMERCIAL

## 2025-02-25 DIAGNOSIS — F41.8 DEPRESSION WITH ANXIETY: ICD-10-CM

## 2025-03-04 ENCOUNTER — PATIENT OUTREACH (OUTPATIENT)
Dept: CARE COORDINATION | Facility: CLINIC | Age: 30
End: 2025-03-04
Payer: COMMERCIAL

## 2025-03-18 ENCOUNTER — PATIENT OUTREACH (OUTPATIENT)
Dept: CARE COORDINATION | Facility: CLINIC | Age: 30
End: 2025-03-18
Payer: COMMERCIAL

## 2025-03-20 ASSESSMENT — ASTHMA QUESTIONNAIRES
QUESTION_1 LAST FOUR WEEKS HOW MUCH OF THE TIME DID YOUR ASTHMA KEEP YOU FROM GETTING AS MUCH DONE AT WORK, SCHOOL OR AT HOME: NONE OF THE TIME
QUESTION_2 LAST FOUR WEEKS HOW OFTEN HAVE YOU HAD SHORTNESS OF BREATH: ONCE OR TWICE A WEEK
QUESTION_5 LAST FOUR WEEKS HOW WOULD YOU RATE YOUR ASTHMA CONTROL: WELL CONTROLLED
QUESTION_3 LAST FOUR WEEKS HOW OFTEN DID YOUR ASTHMA SYMPTOMS (WHEEZING, COUGHING, SHORTNESS OF BREATH, CHEST TIGHTNESS OR PAIN) WAKE YOU UP AT NIGHT OR EARLIER THAN USUAL IN THE MORNING: NOT AT ALL
ACT_TOTALSCORE: 23
QUESTION_4 LAST FOUR WEEKS HOW OFTEN HAVE YOU USED YOUR RESCUE INHALER OR NEBULIZER MEDICATION (SUCH AS ALBUTEROL): NOT AT ALL

## 2025-03-25 ENCOUNTER — VIRTUAL VISIT (OUTPATIENT)
Dept: FAMILY MEDICINE | Facility: CLINIC | Age: 30
End: 2025-03-25
Payer: COMMERCIAL

## 2025-03-25 DIAGNOSIS — E66.01 MORBID OBESITY (H): Primary | ICD-10-CM

## 2025-03-25 PROCEDURE — 98005 SYNCH AUDIO-VIDEO EST LOW 20: CPT | Performed by: PHYSICIAN ASSISTANT

## 2025-03-25 ASSESSMENT — ANXIETY QUESTIONNAIRES
IF YOU CHECKED OFF ANY PROBLEMS ON THIS QUESTIONNAIRE, HOW DIFFICULT HAVE THESE PROBLEMS MADE IT FOR YOU TO DO YOUR WORK, TAKE CARE OF THINGS AT HOME, OR GET ALONG WITH OTHER PEOPLE: VERY DIFFICULT
GAD7 TOTAL SCORE: 8
4. TROUBLE RELAXING: SEVERAL DAYS
7. FEELING AFRAID AS IF SOMETHING AWFUL MIGHT HAPPEN: SEVERAL DAYS
5. BEING SO RESTLESS THAT IT IS HARD TO SIT STILL: NOT AT ALL
GAD7 TOTAL SCORE: 8
3. WORRYING TOO MUCH ABOUT DIFFERENT THINGS: SEVERAL DAYS
6. BECOMING EASILY ANNOYED OR IRRITABLE: NEARLY EVERY DAY
1. FEELING NERVOUS, ANXIOUS, OR ON EDGE: SEVERAL DAYS
2. NOT BEING ABLE TO STOP OR CONTROL WORRYING: SEVERAL DAYS

## 2025-03-25 NOTE — PROGRESS NOTES
"Meme is a 30 year old who is being evaluated via a billable video visit.    How would you like to obtain your AVS? MyChart  If the video visit is dropped, the invitation should be resent by: Send to e-mail at: [email or web address]  Will anyone else be joining your video visit? No      Assessment & Plan     Morbid obesity (H)  Body mass index (BMI) of 40.1 to 44.9 in adult (H)  Start with zepbound 2.5 mg once weekly.  Side effects and how to take the medication discussed.  She will follow up in 3-4 weeks and will adjust dose to 5 mg.   She will reach out with any concerns in the meantime.   Continue with healthy habits, routine exercise and healthy diet.   - tirzepatide-Weight Management (ZEPBOUND) 2.5 MG/0.5ML prefilled pen; Inject 0.5 mLs (2.5 mg) subcutaneously every 7 days.          BMI  Estimated body mass index is 39.16 kg/m  as calculated from the following:    Height as of 4/3/24: 1.702 m (5' 7\").    Weight as of 4/3/24: 113.4 kg (250 lb).   Weight management plan: Discussed healthy diet and exercise guidelines prescription given          Subjective   Meme is a 30 year old, presenting for the following health issues:  Weight Loss (discuss)        3/25/2025     7:30 AM   Additional Questions   Roomed by Mike MARTINEZ MA     History of Present Illness       Reason for visit:  Weight loss She is missing 2 dose(s) of medications per week.  She is not taking prescribed medications regularly due to remembering to take.        Current weight 256 pounds. Height 5ft 7 in  BMI 40.6.     Has been able to lose weight with diet and exercise. She worked with a  last year.   She lost 50 pounds, then gained back 25 and struggling again despite her efforts with calorie deficit and exercise. She would like to try zepbound.   She has not checked insurance for coverage.               Review of Systems  Constitutional, HEENT, cardiovascular, pulmonary, GI, , musculoskeletal, neuro, skin, endocrine and psych systems are " negative, except as otherwise noted.      Objective           Vitals:  No vitals were obtained today due to virtual visit.    Physical Exam   GENERAL: alert and no distress  EYES: Eyes grossly normal to inspection.  No discharge or erythema, or obvious scleral/conjunctival abnormalities.  RESP: No audible wheeze, cough, or visible cyanosis.    SKIN: Visible skin clear. No significant rash, abnormal pigmentation or lesions.  NEURO: Cranial nerves grossly intact.  Mentation and speech appropriate for age.  PSYCH: Appropriate affect, tone, and pace of words          Video-Visit Details    Type of service:  Video Visit   Originating Location (pt. Location): Home    Distant Location (provider location):  On-site  Platform used for Video Visit: Jeison  Signed Electronically by: Kristen M. Kehr, PA-C

## 2025-03-26 ENCOUNTER — MYC MEDICAL ADVICE (OUTPATIENT)
Dept: FAMILY MEDICINE | Facility: CLINIC | Age: 30
End: 2025-03-26
Payer: COMMERCIAL

## 2025-03-26 NOTE — TELEPHONE ENCOUNTER
Routing to provider to review and advise, see [employer] message below.  Pt had virtual visit with provider yesterday and GLP-1 meds were discussed.

## 2025-05-11 ENCOUNTER — HEALTH MAINTENANCE LETTER (OUTPATIENT)
Age: 30
End: 2025-05-11

## 2025-06-03 ENCOUNTER — ALLIED HEALTH/NURSE VISIT (OUTPATIENT)
Dept: FAMILY MEDICINE | Facility: CLINIC | Age: 30
End: 2025-06-03
Payer: COMMERCIAL

## 2025-06-03 DIAGNOSIS — Z30.42 ENCOUNTER FOR DEPO-PROVERA CONTRACEPTION: Primary | ICD-10-CM

## 2025-06-03 PROCEDURE — 96372 THER/PROPH/DIAG INJ SC/IM: CPT | Performed by: PHYSICIAN ASSISTANT

## 2025-06-03 PROCEDURE — 99207 PR NO CHARGE NURSE ONLY: CPT

## 2025-06-03 RX ADMIN — MEDROXYPROGESTERONE ACETATE 150 MG: 150 INJECTION, SUSPENSION INTRAMUSCULAR at 15:04

## 2025-06-03 NOTE — PROGRESS NOTES
Clinic Administered Medication Documentation      Depo Provera Documentation    Depo-Provera Standing Order inclusion/exclusion criteria reviewed.     Is this the initial or subsequent dose of Depo Provera? Subsequent dose - patient is within the acceptable window of time (11-15 weeks) for subsequent injection. Pregnancy test not indicated.    Patient meets: inclusion criteria     Is there an active order (written within the past 365 days, with administrations remaining, not ) in the chart? Yes.     Prior to injection, verified patient identity using patient's name and date of birth. Medication was administered. Please see MAR and medication order for additional information.     Vial/Syringe: Single dose vial. Was entire vial of medication used? Yes    Patient instructed to remain in clinic for 15 minutes and report any adverse reaction to staff immediately.  NEXT INJECTION DUE: 25 - 25    Verified that the patient has refills remaining in their prescription.    [de-identified] : In office for follow-up for food allergies.  History of widespread urticaria from egg, without respiratory or gastrointestinal symptoms.  Also history of vomiting 2 to 3 hours after eating salmon.  Blood work showed IgE to egg, peanut, tree nuts, except walnut and pecan, sesame, borderline to soy, coconut, pea, dog and cat.  She avoids egg, peanut, tree nuts and sesame strictly.  She tolerates green peas and lentils.  She eats soy and coconut products 3 times weekly.  If she eats them more frequently, she gets a mild rash.  She avoids all seafood.  Parents have celiac disease.  Gets abdominal bloating and is uncomfortable during the night if eating gluten later during the day.  Eats occasional gluten early in the day. Tolerates dairy. Family has Auvi-Q 0.1 mg, which was not covered by plan last year and it was very expensive. No chronic stuffy nose, frequent infections, wheezing.  No rashes if not eating excessive soy or coconut. declines

## 2025-06-03 NOTE — PROGRESS NOTES
{PROVIDER CHARTING PREFERENCE:508827}    Penny Valentine is a 30 year old, presenting for the following health issues:  Weight Management        6/3/2025     2:25 PM   Additional Questions   Roomed by patrice CUELLAR      {MA/LPN/RN Pre-Provider Visit Orders- hCG/UA/Strep (Optional):096879}  {SUPERLIST (Optional):293261}  {additonal problems for provider to add (Optional):453761}    {ROS Picklists (Optional):592687}      Objective    There were no vitals taken for this visit.  There is no height or weight on file to calculate BMI.  Physical Exam   {Exam List (Optional):018075}    {Diagnostic Test Results (Optional):848502}        Signed Electronically by: [facility]  {Email feedback regarding this note to [email or web address]   :[id number]}